# Patient Record
Sex: FEMALE | Race: BLACK OR AFRICAN AMERICAN | NOT HISPANIC OR LATINO | Employment: UNEMPLOYED | URBAN - METROPOLITAN AREA
[De-identification: names, ages, dates, MRNs, and addresses within clinical notes are randomized per-mention and may not be internally consistent; named-entity substitution may affect disease eponyms.]

---

## 2020-08-17 ENCOUNTER — HOSPITAL ENCOUNTER (INPATIENT)
Facility: HOSPITAL | Age: 45
LOS: 3 days | Discharge: HOME | DRG: 812 | End: 2020-08-20
Attending: EMERGENCY MEDICINE | Admitting: INTERNAL MEDICINE
Payer: MEDICAID

## 2020-08-17 DIAGNOSIS — D57.00 SICKLE CELL CRISIS: Primary | ICD-10-CM

## 2020-08-17 DIAGNOSIS — R07.9 CHEST PAIN: ICD-10-CM

## 2020-08-17 DIAGNOSIS — R09.02 HYPOXIA: ICD-10-CM

## 2020-08-17 DIAGNOSIS — M79.606 LEG PAIN: ICD-10-CM

## 2020-08-17 DIAGNOSIS — D57.00 SICKLE CELL PAIN CRISIS: ICD-10-CM

## 2020-08-17 PROCEDURE — 96361 HYDRATE IV INFUSION ADD-ON: CPT

## 2020-08-17 PROCEDURE — 96374 THER/PROPH/DIAG INJ IV PUSH: CPT

## 2020-08-17 PROCEDURE — 99285 EMERGENCY DEPT VISIT HI MDM: CPT | Mod: 25

## 2020-08-17 PROCEDURE — 93005 ELECTROCARDIOGRAM TRACING: CPT | Performed by: INTERNAL MEDICINE

## 2020-08-17 PROCEDURE — 12000002 HC ACUTE/MED SURGE SEMI-PRIVATE ROOM

## 2020-08-17 PROCEDURE — 96376 TX/PRO/DX INJ SAME DRUG ADON: CPT

## 2020-08-17 RX ORDER — HYDROMORPHONE HYDROCHLORIDE 1 MG/ML
1 INJECTION, SOLUTION INTRAMUSCULAR; INTRAVENOUS; SUBCUTANEOUS
Status: COMPLETED | OUTPATIENT
Start: 2020-08-18 | End: 2020-08-18

## 2020-08-18 PROBLEM — J18.9 PNEUMONIA: Status: ACTIVE | Noted: 2020-08-18

## 2020-08-18 PROBLEM — D57.00 SICKLE CELL CRISIS: Status: ACTIVE | Noted: 2020-08-18

## 2020-08-18 PROBLEM — R09.02 HYPOXIA: Status: ACTIVE | Noted: 2020-08-18

## 2020-08-18 LAB
ABO + RH BLD: NORMAL
ALBUMIN SERPL BCP-MCNC: 4.2 G/DL (ref 3.5–5.2)
ALP SERPL-CCNC: 60 U/L (ref 55–135)
ALT SERPL W/O P-5'-P-CCNC: 18 U/L (ref 10–44)
AMPHET+METHAMPHET UR QL: NEGATIVE
ANION GAP SERPL CALC-SCNC: 9 MMOL/L (ref 8–16)
AST SERPL-CCNC: 33 U/L (ref 10–40)
BARBITURATES UR QL SCN>200 NG/ML: NEGATIVE
BASOPHILS # BLD AUTO: 0.05 K/UL (ref 0–0.2)
BASOPHILS NFR BLD: 0.4 % (ref 0–1.9)
BENZODIAZ UR QL SCN>200 NG/ML: NEGATIVE
BILIRUB SERPL-MCNC: 2.4 MG/DL (ref 0.1–1)
BLD GP AB SCN CELLS X3 SERPL QL: NORMAL
BLOOD GROUP ANTIBODIES SERPL: NORMAL
BNP SERPL-MCNC: 40 PG/ML (ref 0–99)
BUN SERPL-MCNC: 8 MG/DL (ref 6–20)
BZE UR QL SCN: NEGATIVE
CALCIUM SERPL-MCNC: 8.7 MG/DL (ref 8.7–10.5)
CANNABINOIDS UR QL SCN: NEGATIVE
CHLORIDE SERPL-SCNC: 104 MMOL/L (ref 95–110)
CK MB SERPL-MCNC: 0.9 NG/ML (ref 0.1–6.5)
CK SERPL-CCNC: 84 U/L (ref 20–180)
CO2 SERPL-SCNC: 24 MMOL/L (ref 23–29)
CREAT SERPL-MCNC: 0.6 MG/DL (ref 0.5–1.4)
CREAT UR-MCNC: 49 MG/DL (ref 15–325)
DIFFERENTIAL METHOD: ABNORMAL
EOSINOPHIL # BLD AUTO: 0.3 K/UL (ref 0–0.5)
EOSINOPHIL NFR BLD: 1.8 % (ref 0–8)
ERYTHROCYTE [DISTWIDTH] IN BLOOD BY AUTOMATED COUNT: 19 % (ref 11.5–14.5)
EST. GFR  (AFRICAN AMERICAN): >60 ML/MIN/1.73 M^2
EST. GFR  (NON AFRICAN AMERICAN): >60 ML/MIN/1.73 M^2
GLUCOSE SERPL-MCNC: 100 MG/DL (ref 70–110)
HCT VFR BLD AUTO: 24.2 % (ref 37–48.5)
HGB BLD-MCNC: 8.5 G/DL (ref 12–16)
IMM GRANULOCYTES # BLD AUTO: 0.11 K/UL (ref 0–0.04)
IMM GRANULOCYTES NFR BLD AUTO: 0.8 % (ref 0–0.5)
LYMPHOCYTES # BLD AUTO: 4.7 K/UL (ref 1–4.8)
LYMPHOCYTES NFR BLD: 33.2 % (ref 18–48)
MCH RBC QN AUTO: 32 PG (ref 27–31)
MCHC RBC AUTO-ENTMCNC: 35.1 G/DL (ref 32–36)
MCV RBC AUTO: 91 FL (ref 82–98)
MONOCYTES # BLD AUTO: 1.6 K/UL (ref 0.3–1)
MONOCYTES NFR BLD: 11.2 % (ref 4–15)
NEUTROPHILS # BLD AUTO: 7.4 K/UL (ref 1.8–7.7)
NEUTROPHILS NFR BLD: 52.6 % (ref 38–73)
NRBC BLD-RTO: 1 /100 WBC
OPIATES UR QL SCN: NEGATIVE
PCP UR QL SCN>25 NG/ML: NEGATIVE
PLATELET # BLD AUTO: 379 K/UL (ref 150–350)
PMV BLD AUTO: 10.4 FL (ref 9.2–12.9)
POTASSIUM SERPL-SCNC: 3.5 MMOL/L (ref 3.5–5.1)
PROCALCITONIN SERPL IA-MCNC: 0.05 NG/ML (ref 0–0.5)
PROT SERPL-MCNC: 7.7 G/DL (ref 6–8.4)
RBC # BLD AUTO: 2.66 M/UL (ref 4–5.4)
RETICS/RBC NFR AUTO: 8 % (ref 0.5–2.5)
SARS-COV-2 RDRP RESP QL NAA+PROBE: NEGATIVE
SODIUM SERPL-SCNC: 137 MMOL/L (ref 136–145)
TOXICOLOGY INFORMATION: NORMAL
TROPONIN I SERPL DL<=0.01 NG/ML-MCNC: <0.03 NG/ML
WBC # BLD AUTO: 14.12 K/UL (ref 3.9–12.7)

## 2020-08-18 PROCEDURE — 36415 COLL VENOUS BLD VENIPUNCTURE: CPT

## 2020-08-18 PROCEDURE — 63600175 PHARM REV CODE 636 W HCPCS: Performed by: INTERNAL MEDICINE

## 2020-08-18 PROCEDURE — 12000002 HC ACUTE/MED SURGE SEMI-PRIVATE ROOM

## 2020-08-18 PROCEDURE — 25000003 PHARM REV CODE 250: Performed by: INTERNAL MEDICINE

## 2020-08-18 PROCEDURE — 82553 CREATINE MB FRACTION: CPT

## 2020-08-18 PROCEDURE — 25500020 PHARM REV CODE 255: Performed by: EMERGENCY MEDICINE

## 2020-08-18 PROCEDURE — 86902 BLOOD TYPE ANTIGEN DONOR EA: CPT

## 2020-08-18 PROCEDURE — 84484 ASSAY OF TROPONIN QUANT: CPT | Mod: 91

## 2020-08-18 PROCEDURE — 86850 RBC ANTIBODY SCREEN: CPT

## 2020-08-18 PROCEDURE — 63600175 PHARM REV CODE 636 W HCPCS

## 2020-08-18 PROCEDURE — 63600175 PHARM REV CODE 636 W HCPCS: Performed by: EMERGENCY MEDICINE

## 2020-08-18 PROCEDURE — 83880 ASSAY OF NATRIURETIC PEPTIDE: CPT

## 2020-08-18 PROCEDURE — 86905 BLOOD TYPING RBC ANTIGENS: CPT | Mod: 91

## 2020-08-18 PROCEDURE — 82550 ASSAY OF CK (CPK): CPT

## 2020-08-18 PROCEDURE — U0002 COVID-19 LAB TEST NON-CDC: HCPCS

## 2020-08-18 PROCEDURE — 63700000 PHARM REV CODE 250 ALT 637 W/O HCPCS: Performed by: INTERNAL MEDICINE

## 2020-08-18 PROCEDURE — 86870 RBC ANTIBODY IDENTIFICATION: CPT

## 2020-08-18 PROCEDURE — 85025 COMPLETE CBC W/AUTO DIFF WBC: CPT

## 2020-08-18 PROCEDURE — 80053 COMPREHEN METABOLIC PANEL: CPT

## 2020-08-18 PROCEDURE — 80307 DRUG TEST PRSMV CHEM ANLYZR: CPT

## 2020-08-18 PROCEDURE — 84145 PROCALCITONIN (PCT): CPT

## 2020-08-18 PROCEDURE — 84484 ASSAY OF TROPONIN QUANT: CPT

## 2020-08-18 PROCEDURE — 86922 COMPATIBILITY TEST ANTIGLOB: CPT

## 2020-08-18 PROCEDURE — 85045 AUTOMATED RETICULOCYTE COUNT: CPT

## 2020-08-18 RX ORDER — IBUPROFEN 200 MG
24 TABLET ORAL
Status: DISCONTINUED | OUTPATIENT
Start: 2020-08-18 | End: 2020-08-20 | Stop reason: HOSPADM

## 2020-08-18 RX ORDER — HYDROMORPHONE HYDROCHLORIDE 1 MG/ML
2 INJECTION, SOLUTION INTRAMUSCULAR; INTRAVENOUS; SUBCUTANEOUS
Status: COMPLETED | OUTPATIENT
Start: 2020-08-18 | End: 2020-08-18

## 2020-08-18 RX ORDER — HYDROMORPHONE HYDROCHLORIDE 1 MG/ML
1 INJECTION, SOLUTION INTRAMUSCULAR; INTRAVENOUS; SUBCUTANEOUS
Status: DISCONTINUED | OUTPATIENT
Start: 2020-08-18 | End: 2020-08-18

## 2020-08-18 RX ORDER — HYDROMORPHONE HYDROCHLORIDE 1 MG/ML
1 INJECTION, SOLUTION INTRAMUSCULAR; INTRAVENOUS; SUBCUTANEOUS EVERY 4 HOURS PRN
Status: DISCONTINUED | OUTPATIENT
Start: 2020-08-18 | End: 2020-08-18

## 2020-08-18 RX ORDER — SODIUM CHLORIDE 0.9 % (FLUSH) 0.9 %
10 SYRINGE (ML) INJECTION
Status: DISCONTINUED | OUTPATIENT
Start: 2020-08-18 | End: 2020-08-20 | Stop reason: HOSPADM

## 2020-08-18 RX ORDER — OXYCODONE HYDROCHLORIDE 5 MG/1
15 TABLET ORAL EVERY 4 HOURS PRN
Status: DISCONTINUED | OUTPATIENT
Start: 2020-08-18 | End: 2020-08-20 | Stop reason: HOSPADM

## 2020-08-18 RX ORDER — OXYCODONE HYDROCHLORIDE 15 MG/1
15 TABLET ORAL EVERY 4 HOURS PRN
COMMUNITY
Start: 2020-05-28

## 2020-08-18 RX ORDER — ALPRAZOLAM 0.25 MG/1
1 TABLET ORAL DAILY
COMMUNITY
Start: 2020-07-31 | End: 2023-04-01 | Stop reason: DRUGHIGH

## 2020-08-18 RX ORDER — CYCLOBENZAPRINE HCL 10 MG
1 TABLET ORAL 2 TIMES DAILY PRN
Status: ON HOLD | COMMUNITY
Start: 2020-08-11 | End: 2024-03-24

## 2020-08-18 RX ORDER — CYCLOBENZAPRINE HCL 10 MG
10 TABLET ORAL 3 TIMES DAILY PRN
Status: DISCONTINUED | OUTPATIENT
Start: 2020-08-18 | End: 2020-08-20 | Stop reason: HOSPADM

## 2020-08-18 RX ORDER — OXYCODONE HYDROCHLORIDE 5 MG/1
10 TABLET ORAL EVERY 6 HOURS PRN
Status: DISCONTINUED | OUTPATIENT
Start: 2020-08-18 | End: 2020-08-18

## 2020-08-18 RX ORDER — OXYCODONE HCL 10 MG/1
30 TABLET, FILM COATED, EXTENDED RELEASE ORAL 2 TIMES DAILY
Status: DISCONTINUED | OUTPATIENT
Start: 2020-08-18 | End: 2020-08-20 | Stop reason: HOSPADM

## 2020-08-18 RX ORDER — ALPRAZOLAM 0.25 MG/1
0.25 TABLET ORAL DAILY
Status: DISCONTINUED | OUTPATIENT
Start: 2020-08-18 | End: 2020-08-20 | Stop reason: HOSPADM

## 2020-08-18 RX ORDER — HYDROXYUREA 500 MG/1
1000 CAPSULE ORAL DAILY
Status: DISCONTINUED | OUTPATIENT
Start: 2020-08-18 | End: 2020-08-20 | Stop reason: HOSPADM

## 2020-08-18 RX ORDER — TOPIRAMATE 50 MG/1
50 TABLET, FILM COATED ORAL 2 TIMES DAILY
Status: ON HOLD | COMMUNITY
End: 2023-04-04 | Stop reason: SDUPTHER

## 2020-08-18 RX ORDER — HYDROMORPHONE HYDROCHLORIDE 1 MG/ML
2 INJECTION, SOLUTION INTRAMUSCULAR; INTRAVENOUS; SUBCUTANEOUS EVERY 4 HOURS PRN
Status: DISCONTINUED | OUTPATIENT
Start: 2020-08-18 | End: 2020-08-18

## 2020-08-18 RX ORDER — SODIUM CHLORIDE 9 MG/ML
INJECTION, SOLUTION INTRAVENOUS CONTINUOUS
Status: DISCONTINUED | OUTPATIENT
Start: 2020-08-18 | End: 2020-08-20

## 2020-08-18 RX ORDER — FOLIC ACID 1 MG/1
1 TABLET ORAL DAILY
Status: DISCONTINUED | OUTPATIENT
Start: 2020-08-18 | End: 2020-08-20 | Stop reason: HOSPADM

## 2020-08-18 RX ORDER — OXYCODONE HYDROCHLORIDE 30 MG/1
1 TABLET, FILM COATED, EXTENDED RELEASE ORAL 2 TIMES DAILY
COMMUNITY
Start: 2020-07-16 | End: 2023-04-01 | Stop reason: DRUGHIGH

## 2020-08-18 RX ORDER — OXYCODONE HYDROCHLORIDE 5 MG/1
5 TABLET ORAL EVERY 4 HOURS PRN
Status: DISCONTINUED | OUTPATIENT
Start: 2020-08-18 | End: 2020-08-18

## 2020-08-18 RX ORDER — AZITHROMYCIN 250 MG/1
500 TABLET, FILM COATED ORAL
Status: COMPLETED | OUTPATIENT
Start: 2020-08-18 | End: 2020-08-18

## 2020-08-18 RX ORDER — TOPIRAMATE 25 MG/1
50 TABLET ORAL 2 TIMES DAILY
Status: DISCONTINUED | OUTPATIENT
Start: 2020-08-18 | End: 2020-08-20 | Stop reason: HOSPADM

## 2020-08-18 RX ORDER — HYDROMORPHONE HYDROCHLORIDE 1 MG/ML
1 INJECTION, SOLUTION INTRAMUSCULAR; INTRAVENOUS; SUBCUTANEOUS EVERY 6 HOURS PRN
Status: DISCONTINUED | OUTPATIENT
Start: 2020-08-18 | End: 2020-08-18

## 2020-08-18 RX ORDER — GLUCAGON 1 MG
1 KIT INJECTION
Status: DISCONTINUED | OUTPATIENT
Start: 2020-08-18 | End: 2020-08-20 | Stop reason: HOSPADM

## 2020-08-18 RX ORDER — ZOLPIDEM TARTRATE 10 MG/1
1 TABLET ORAL NIGHTLY
COMMUNITY
Start: 2020-08-12 | End: 2023-04-01

## 2020-08-18 RX ORDER — HYDROMORPHONE HYDROCHLORIDE 1 MG/ML
2 INJECTION, SOLUTION INTRAMUSCULAR; INTRAVENOUS; SUBCUTANEOUS EVERY 4 HOURS PRN
Status: DISCONTINUED | OUTPATIENT
Start: 2020-08-18 | End: 2020-08-20

## 2020-08-18 RX ORDER — IBUPROFEN 200 MG
16 TABLET ORAL
Status: DISCONTINUED | OUTPATIENT
Start: 2020-08-18 | End: 2020-08-20 | Stop reason: HOSPADM

## 2020-08-18 RX ORDER — HYDROXYUREA 500 MG/1
1000 CAPSULE ORAL DAILY
COMMUNITY
Start: 2019-07-01

## 2020-08-18 RX ADMIN — HYDROMORPHONE HYDROCHLORIDE 2 MG: 1 INJECTION, SOLUTION INTRAMUSCULAR; INTRAVENOUS; SUBCUTANEOUS at 03:08

## 2020-08-18 RX ADMIN — HYDROMORPHONE HYDROCHLORIDE 1 MG: 1 INJECTION, SOLUTION INTRAMUSCULAR; INTRAVENOUS; SUBCUTANEOUS at 08:08

## 2020-08-18 RX ADMIN — IOHEXOL 100 ML: 350 INJECTION, SOLUTION INTRAVENOUS at 03:08

## 2020-08-18 RX ADMIN — HYDROMORPHONE HYDROCHLORIDE 2 MG: 1 INJECTION, SOLUTION INTRAMUSCULAR; INTRAVENOUS; SUBCUTANEOUS at 10:08

## 2020-08-18 RX ADMIN — AZITHROMYCIN MONOHYDRATE 500 MG: 250 TABLET ORAL at 07:08

## 2020-08-18 RX ADMIN — SODIUM CHLORIDE, SODIUM LACTATE, POTASSIUM CHLORIDE, AND CALCIUM CHLORIDE 1000 ML: .6; .31; .03; .02 INJECTION, SOLUTION INTRAVENOUS at 12:08

## 2020-08-18 RX ADMIN — ALPRAZOLAM 0.25 MG: 0.25 TABLET ORAL at 10:08

## 2020-08-18 RX ADMIN — HYDROMORPHONE HYDROCHLORIDE 1 MG: 1 INJECTION, SOLUTION INTRAMUSCULAR; INTRAVENOUS; SUBCUTANEOUS at 12:08

## 2020-08-18 RX ADMIN — FOLIC ACID 1 MG: 1 TABLET ORAL at 10:08

## 2020-08-18 RX ADMIN — HYDROXYUREA 1000 MG: 500 CAPSULE ORAL at 11:08

## 2020-08-18 RX ADMIN — TOPIRAMATE 50 MG: 25 TABLET, FILM COATED ORAL at 11:08

## 2020-08-18 RX ADMIN — TOPIRAMATE 50 MG: 25 TABLET, FILM COATED ORAL at 09:08

## 2020-08-18 RX ADMIN — OXYCODONE HYDROCHLORIDE 5 MG: 5 TABLET ORAL at 09:08

## 2020-08-18 RX ADMIN — HYDROMORPHONE HYDROCHLORIDE 2 MG: 1 INJECTION, SOLUTION INTRAMUSCULAR; INTRAVENOUS; SUBCUTANEOUS at 05:08

## 2020-08-18 RX ADMIN — SODIUM CHLORIDE: 0.9 INJECTION, SOLUTION INTRAVENOUS at 07:08

## 2020-08-18 RX ADMIN — OXYCODONE HYDROCHLORIDE 30 MG: 10 TABLET, FILM COATED, EXTENDED RELEASE ORAL at 01:08

## 2020-08-18 RX ADMIN — HYDROMORPHONE HYDROCHLORIDE 1 MG: 1 INJECTION, SOLUTION INTRAMUSCULAR; INTRAVENOUS; SUBCUTANEOUS at 01:08

## 2020-08-18 RX ADMIN — OXYCODONE HYDROCHLORIDE 5 MG: 5 TABLET ORAL at 03:08

## 2020-08-18 RX ADMIN — CEFTRIAXONE 1 G: 1 INJECTION, SOLUTION INTRAVENOUS at 07:08

## 2020-08-18 RX ADMIN — HYDROMORPHONE HYDROCHLORIDE 1 MG: 1 INJECTION, SOLUTION INTRAMUSCULAR; INTRAVENOUS; SUBCUTANEOUS at 09:08

## 2020-08-18 NOTE — ED NOTES
Offered patient oxycodone, refused states she wants iv dilaudid and 5mg oxycodone po is not going to do anything for her back pain, paged md to notify

## 2020-08-18 NOTE — PROGRESS NOTES
Pt has chest pain for many months and feels that they have worsened. She refuses a cardiology consult and accepts the risks.  Current pain 8/10 chest, back legs.   Pt was to be Dilaudid 2 mg Q 4 but the order reverted to 1 mg  MS contin 30 mg PO Q 12 and oxycodone 15 Q 4 prn pain  Plan as above, telemetry, neuro checks Q 4, serial troponin's and EKG's.  Echocardiogram in the AM

## 2020-08-18 NOTE — ASSESSMENT & PLAN NOTE
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

## 2020-08-18 NOTE — HPI
45yo F with h/o PE, sickle cell disease came to the emergency room with the chest pain and neck area pain and mild shortness of breath .  pain is sharp and worst with a deep breath and CTA was done in ER and negative .  She had accidental fall from the swing and he the neck and that is the reason where she was having neck pain.  CT cervical spine was done and no acute fracture.   She has chronic SOB but this has felt worse since Monday, especially on exertion .  Her oxygen level is around 90-91 in the room air and she walked around and oxygen level was tested and it went down to 88 and admitted.  Denied having fever, abdominal pain, joint pains and a her  hemoglobin is stable for a sickle cell patient. Awaited for covid test to come back.  She has of anticoagulation currently.

## 2020-08-18 NOTE — ED PROVIDER NOTES
"Encounter Date: 8/17/2020       History     Chief Complaint   Patient presents with    Chest Pain    Shortness of Breath     Since last Monday. States cant take a deep breath and has hx of PE    Back Pain     Fell off swing and landed on back last Monay     HPI   43yo F with h/o PE, sickle cell disease who presents with chest pain since Monday. The pain is sharp and worst with a deep breath. It is on the left chest. She has chronic SOB but this has felt worse since Monday, especially on exertion. This feels similar to a previous provoked PE diagnosed "years ago" and treated with lovenox for 3 months. She is not on blood thinners currently. She does have b/l leg cramping which feels like typical sickle cell pain.     She also reports cervical and thoracic back pain after a wooden swing fell onto her striking her in the upper back. This occurred 1 week ago from today. She has no numbness or weakness in the arms or legs, no shooting pain outside of the midline upper back. She has been massaging the area which sometimes helps.    Review of patient's allergies indicates:  No Known Allergies  Past Medical History:   Diagnosis Date    Anticoagulant long-term use      History reviewed. No pertinent surgical history.  History reviewed. No pertinent family history.  Social History     Tobacco Use    Smoking status: Never Smoker    Smokeless tobacco: Never Used   Substance Use Topics    Alcohol use: Not Currently    Drug use: Not Currently     Review of Systems   Constitutional: Negative for fever.   HENT: Negative for sore throat.    Respiratory: Positive for shortness of breath.    Cardiovascular: Positive for chest pain.   Gastrointestinal: Negative for nausea.   Genitourinary: Negative for dysuria.   Musculoskeletal: Positive for back pain.        Leg cramping   Skin: Negative for rash.   Neurological: Negative for weakness.   Hematological: Does not bruise/bleed easily.       Physical Exam     Initial Vitals " [08/17/20 2326]   BP Pulse Resp Temp SpO2   (!) 147/69 (!) 115 16 99.5 °F (37.5 °C) 96 %      MAP       --         Physical Exam    Constitutional: She appears well-developed and well-nourished. She is not diaphoretic.   HENT:   Head: Normocephalic and atraumatic.   Eyes: EOM are normal. Pupils are equal, round, and reactive to light. Right eye exhibits no discharge. Left eye exhibits no discharge.   Neck: Normal range of motion. Neck supple.   Cardiovascular: Regular rhythm and normal heart sounds. Exam reveals no gallop and no friction rub.    No murmur heard.  tachycardia   Pulmonary/Chest: Breath sounds normal. No respiratory distress. She has no wheezes. She has no rhonchi. She has no rales.   Abdominal: Soft. She exhibits no distension. There is no abdominal tenderness. There is no rebound and no guarding.   Musculoskeletal: No tenderness or edema.      Comments: No erythema, tenderness, or swelling to the b/l LE   Neurological: She is alert and oriented to person, place, and time.   5/5 strength to the b/l LE and UE, sensation in tact to light touch to b/l UE and LE.   Skin: Skin is warm and dry.   Psychiatric: She has a normal mood and affect. Thought content normal.         ED Course   Procedures  Labs Reviewed   CBC W/ AUTO DIFFERENTIAL - Abnormal; Notable for the following components:       Result Value    WBC 14.12 (*)     RBC 2.66 (*)     Hemoglobin 8.5 (*)     Hematocrit 24.2 (*)     Mean Corpuscular Hemoglobin 32.0 (*)     RDW 19.0 (*)     Platelets 379 (*)     Immature Granulocytes 0.8 (*)     Immature Grans (Abs) 0.11 (*)     Mono # 1.6 (*)     nRBC 1 (*)     All other components within normal limits   COMPREHENSIVE METABOLIC PANEL - Abnormal; Notable for the following components:    Total Bilirubin 2.4 (*)     All other components within normal limits   RETICULOCYTES - Abnormal; Notable for the following components:    Retic 8.0 (*)     All other components within normal limits   TROPONIN I    B-TYPE NATRIURETIC PEPTIDE   CK   CK-MB   DRUG SCREEN PANEL, URINE EMERGENCY    Narrative:     Specimen Source->Urine   TROPONIN I   SARS-COV-2 RNA AMPLIFICATION, QUAL   HAPTOGLOBIN   POCT URINE PREGNANCY   TYPE & SCREEN   ANTIBODY IDENTIFICATION   PREPARE RBC SOFT        ECG Results          EKG 12-lead (Final result)  Result time 08/18/20 09:23:22    Final result by Interface, Lab In [facility] (08/18/20 09:23:22)                 Narrative:    Test Reason : R07.9,    Vent. Rate : 108 BPM     Atrial Rate : 108 BPM     P-R Int : 130 ms          QRS Dur : 072 ms      QT Int : 328 ms       P-R-T Axes : 054 039 -20 degrees     QTc Int : 439 ms    Sinus tachycardia  Nonspecific T wave abnormality  Abnormal ECG  No previous ECGs available  Confirmed by Uri Kirk MD ([id number]) on 8/18/2020 9:23:07 AM    Referred By: DANIELAERR   SELF           Confirmed By:Uri Kirk MD                             EKG 12-LEAD (Final result)  Result time 08/19/20 16:04:40    Final result by Unknown User (08/19/20 16:04:40)                                Imaging Results          CTA Chest Non-Coronary (PE Study) (Final result)  Result time 08/17/20 23:45:00    Final result by Omar Mcgarry MD (08/17/20 23:45:00)                 Narrative:    EXAM DESCRIPTION: CTA CHEST NON CORONARY 8/18/2020 3:09 AM CDT    CLINICAL HISTORY: 44 years, Female, h/o PE and sickle cell disease. chest pain that feels like previous PE    COMPARISON: None.    PROCEDURE:  Multiple transaxial tomograms of the chest were obtained from the lung apices through the lung bases utilizing 2 mm slice thickness at 2 mm interval reconstruction after the administration of 100 cc of Omnipaque 350 at a rate of 4.0 cc per second for complete opacification of the pulmonary arteries.  Subsequent maximum intensity projection images were generated in the coronal and sagittal plane for review.  An individualized dose optimization technique, Automated Exposure Control, was utilized  for the performed procedure.    FINDINGS:  The lungs parenchyma demonstrate dependent atelectatic changes mid right upper lung and within the lung bases, slightly more pronounced left side than right. No significant masses, nodules and/or consolidations are identified.  The trachea mainstem bronchus demonstrate to be normal. There is no significant pericardial or pleural effusions.  Aorta and great vessels demonstrate to be unremarkable. No definitive thoracic aortic dissection. The heart is normal in size. No evidence for right ventricular strain. No coronary artery calcifications There is no significant mediastinal and/or hilar lymphadenopathy. Pulmonary arteries demonstrate to be normal, no intraluminal defect are seen that would suggest pulmonary embolus.  The axillary regions demonstrate to be clear.  There is is small anterior right diaphragmatic eventration.  The rest bone windows demonstrate no significant skeletal lesions.    IMPRESSION:    NO EVIDENCE FOR PULMONARY EMBOLUS AND/OR THORACIC AORTIC THORACIC DISSECTION.  DEPENDENT ATELECTATIC CHANGES.    Electronically signed by:  Omar Mcgarry MD  8/18/2020 3:14 AM CDT Workstation: [id number]                             CT Cervical Spine Without Contrast (Final result)  Result time 08/17/20 23:44:00    Final result by Omar Mcgarry MD (08/17/20 23:44:00)                 Narrative:    EXAM DESCRIPTION: CT CERVICAL SPINE WITHOUT CONTRAST 8/18/2020 2:41 AM CDT    CLINICAL HISTORY: 44 years, Female, trauma to neck 1 week ago, midline neck pain; Neck pain/ no trauma    COMPARISON: None    PROCEDURE:  Multiple axial CT images through the cervical spine were obtained at 2 mm slice thickness at 2 mm interval reconstruction. In addition 2-D multiplanar reformats and the sagittal coronal plane were performed and reviewed.  An individualized dose optimization technique, Automated Exposure Control, was utilized for the performed procedure.    FINDINGS:  The alignment,  vertebral body heights, and disc spaces are normal.  There is no evidence of fracture or subluxation. Very minimal degenerative disc disease is identified at C6/C7. The spinal canal demonstrate no evidence for significant stenosis. Neural foramina demonstrate to be unremarkable. The uncovertebral joints demonstrate to be normal. There is no prevertebral soft tissue swelling.  Sagittal coronal reformatted images demonstrate no subluxation or bony abnormalities.    IMPRESSION:    CT CERVICAL SPINE NEGATIVE FOR FRACTURE OR SUBLUXATION.    Electronically signed by:  Omar Mcgarry MD  8/18/2020 2:43 AM CDT Workstation: [id number]                             US Lower Extremity Veins Bilateral (Final result)  Result time 08/17/20 23:57:00    Final result by Omar Mcgarry MD (08/17/20 23:57:00)                 Narrative:    EXAM DESCRIPTION: US LOWER EXTREMITY VEINS BILATERAL 8/18/2020 1:37 AM CDT    CLINICAL HISTORY: 44 years, Female,    COMPARISON: None.    FINDINGS:    Multiple grayscale images as well as duplex Doppler ultrasound of both lower extremities were performed.  Both common femoral veins, superficial femoral veins, popliteal veins, posterior tibial veins at the level the calf and ankles were imaged.  Spectral waveform demonstrate normal compressibility and phasicity.  No intraluminal defects were seen.    IMPRESSION:    NO EVIDENCE FOR DEEP VEIN THROMBOSIS BILATERAL LOWER EXTREMITIES.    Electronically signed by:  Omar Mcgarry MD  8/18/2020 1:38 AM CDT Workstation: [id number]                             X-Ray Chest AP Portable (Final result)  Result time 08/17/20 23:30:00    Final result by Omar Mcgarry MD (08/17/20 23:30:00)                 Narrative:    EXAM DESCRIPTION: XR CHEST AP PORTABLE 8/18/2020 1:38 AM CDT    CLINICAL HISTORY: 44 years, Female, Chest Pain; Chest Pain?; Shortness of Breath?(Since last Monday. States cant take a deep breath and has hx of PE); Back Pain?(Fell off swing and landed on  back last Monay)    COMPARISON: 4/9/2018    FINDINGS:    Single view of the chest was obtained portable. Prior films were compared. External EKG leads within the field-of-view limits diagnosis. Lung volume is slightly decreased.  The cardiomediastinal silhouette demonstrate to be unremarkable.  The heart is in the upper normal size. The thoracic aorta is mildly tortuous. The pulmonary vasculature is normal distribution. Costophrenic angles are sharp.  No areas of consolidation or masses are seen.  The rest of the soft tissue and bony structures demonstrate to be unremarkable.    IMPRESSION:    SLIGHT DECREASED LUNG VOLUME. NO ACUTE CARDIOPULMONARY DISEASE SEEN.    Electronically signed by:  Omar Mcgarry MD  8/18/2020 1:40 AM CDT Workstation: [id number]                            MDM  43yo F with h/o sickle cell disease and PE who presents with neck pain and pleuritic chest pain and SOB  VS notable for temp of 99.5F, Tachycardic to 120s on arrival but sating mid 90s on RA.   Given her h/o PE, work up initiated for PE vs PNA vs acute chest.   Labs notable for WBC of 14.12, Hgb of 8.5, with retic of 8. Trop and BNP are wnl. CT PE shows no acute PE, and only minimal b/l atelectasis. Her pain was treated with 1mg dilaudid and then 2mg dilaudid which did help. However while here, she was noted to desat to 89% on RA while at rest. This was present prior to dilaudid second round and was with normal respiratory rate and alertness. She continued to have poor saturations in the low 90s. I walked the pt and she became tachycardic to 130s with hypoxia to 88% after a 1 minute walk. Unclear if this is a developing PNA, COVID, or early acute chest. I have covered her for CAP with CTX and azithromycin and have placed her on 2L O2 which she responded well to. Will admit to hosp medicine for monitoring and treatment.    Omar Finch MD  Resident, PGY-3  8/19/2020 5:02 AM                      Attending Attestation:   Physician  Attestation Statement for Resident:  As the supervising MD   Physician Attestation Statement: I have personally seen and examined this patient.   I agree with the above history. -:   As the supervising MD I agree with the above PE.    As the supervising MD I agree with the above treatment, course, plan, and disposition.  I was personally present during the entire procedure.  I have reviewed and agree with the residents interpretation of the following: lab data, x-rays, CT scans and EKG.  I have reviewed the following: old records at this facility.                                  Clinical Impression:       ICD-10-CM ICD-9-CM   1. Hypoxia  R09.02 799.02   2. Chest pain  R07.9 786.50   3. Leg pain  M79.606 729.5   4. Sickle cell pain crisis  D57.00 282.62             ED Disposition Condition    Observation                           Omar Finch MD  Resident  08/18/20 0502       Can Tolbert MD  08/19/20 9614

## 2020-08-18 NOTE — PLAN OF CARE
08/18/20 0900   Home Oxygen Qualification   Room Air SpO2 At Rest 96 %   Room Air SpO2 on Exertion 93 %   SpO2 on Recovery 94 %   Recovery Heart Rate 120 bpm   Home O2 Eval Comments Patient walked 6 minutes did not require oxygen

## 2020-08-18 NOTE — SUBJECTIVE & OBJECTIVE
Past Medical History:   Diagnosis Date    Anticoagulant long-term use        No past surgical history on file.    Review of patient's allergies indicates:  No Known Allergies    No current facility-administered medications on file prior to encounter.      No current outpatient medications on file prior to encounter.     Family History     None        Tobacco Use    Smoking status: Not on file   Substance and Sexual Activity    Alcohol use: Not Currently    Drug use: Not Currently    Sexual activity: Not Currently     Review of Systems   Constitutional: Positive for activity change. Negative for appetite change and chills.   HENT: Negative for congestion.    Respiratory: Positive for chest tightness. Negative for apnea.    Cardiovascular: Negative for chest pain.   Gastrointestinal: Negative for abdominal distention and abdominal pain.   Genitourinary: Negative for difficulty urinating.   Musculoskeletal: Negative for arthralgias.   Neurological: Negative for dizziness.   Psychiatric/Behavioral: Negative for agitation.     Objective:     Vital Signs (Most Recent):  Temp: 99.5 °F (37.5 °C) (08/17/20 2326)  Pulse: 97 (08/18/20 0426)  Resp: 15 (08/18/20 0426)  BP: 129/66 (08/18/20 0345)  SpO2: 99 % (08/18/20 0426) Vital Signs (24h Range):  Temp:  [99.5 °F (37.5 °C)] 99.5 °F (37.5 °C)  Pulse:  [] 97  Resp:  [15-28] 15  SpO2:  [94 %-99 %] 99 %  BP: (122-147)/(65-74) 129/66     Weight: 81.6 kg (180 lb)  Body mass index is 32.92 kg/m².    Physical Exam  Vitals signs and nursing note reviewed.   HENT:      Head: Normocephalic and atraumatic.   Eyes:      Conjunctiva/sclera: Conjunctivae normal.   Neck:      Vascular: No JVD.   Cardiovascular:      Heart sounds: Normal heart sounds.   Pulmonary:      Effort: Pulmonary effort is normal.      Breath sounds: Normal breath sounds.   Abdominal:      General: Bowel sounds are normal.      Palpations: Abdomen is soft.   Skin:     General: Skin is warm.   Neurological:       Mental Status: She is alert and oriented to person, place, and time.             Significant Labs:   CBC:   Recent Labs   Lab 08/18/20  0027   WBC 14.12*   HGB 8.5*   HCT 24.2*   *     CMP:   Recent Labs   Lab 08/18/20  0027      K 3.5      CO2 24      BUN 8   CREATININE 0.6   CALCIUM 8.7   PROT 7.7   ALBUMIN 4.2   BILITOT 2.4*   ALKPHOS 60   AST 33   ALT 18   ANIONGAP 9   EGFRNONAA >60.0       Significant Imaging: I have reviewed and interpreted all pertinent imaging results/findings within the past 24 hours.     CT chest   FINDINGS:  The lungs parenchyma demonstrate dependent atelectatic changes mid right upper lung and within the lung bases, slightly more pronounced left side than right. No significant masses, nodules and/or consolidations are identified.  The trachea mainstem bronchus demonstrate to be normal. There is no significant pericardial or pleural effusions.  Aorta and great vessels demonstrate to be unremarkable. No definitive thoracic aortic dissection. The heart is normal in size. No evidence for right ventricular strain. No coronary artery calcifications There is no significant mediastinal and/or hilar lymphadenopathy. Pulmonary arteries demonstrate to be normal, no intraluminal defect are seen that would suggest pulmonary embolus.  The axillary regions demonstrate to be clear.  There is is small anterior right diaphragmatic eventration.  The rest bone windows demonstrate no significant skeletal lesions.     IMPRESSION:     NO EVIDENCE FOR PULMONARY EMBOLUS AND/OR THORACIC AORTIC THORACIC DISSECTION.  DEPENDENT ATELECTATIC CHANGES.

## 2020-08-18 NOTE — H&P
[facility] Medicine  History & Physical    Patient Name: Sunita Mendez  MRN: [id number]  Admission Date: 8/17/2020  Attending Physician: No att. providers found   Primary Care Provider: No primary care provider on file.         Patient information was obtained from patient and ER records.     Subjective:     Principal Problem:Pneumonia    Chief Complaint:   Chief Complaint   Patient presents with    Chest Pain    Shortness of Breath     Since last Monday. States cant take a deep breath and has hx of PE    Back Pain     Fell off swing and landed on back last Monay        HPI: 43yo F with h/o PE, sickle cell disease came to the emergency room with the chest pain and neck area pain and mild shortness of breath .  pain is sharp and worst with a deep breath and CTA was done in ER and negative .  She had accidental fall from the swing and he the neck and that is the reason where she was having neck pain.  CT cervical spine was done and no acute fracture.   She has chronic SOB but this has felt worse since Monday, especially on exertion .  Her oxygen level is around 90-91 in the room air and she walked around and oxygen level was tested and it went down to 88 and admitted.  Denied having fever, abdominal pain, joint pains and a her  hemoglobin is stable for a sickle cell patient. Awaited for covid test to come back.  She is off  anticoagulation currently.    No new subjective & objective note has been filed under this hospital service since the last note was generated.    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

## 2020-08-19 ENCOUNTER — CLINICAL SUPPORT (OUTPATIENT)
Dept: CARDIOLOGY | Facility: HOSPITAL | Age: 45
DRG: 812 | End: 2020-08-19
Attending: INTERNAL MEDICINE
Payer: MEDICAID

## 2020-08-19 VITALS — HEIGHT: 62 IN | WEIGHT: 179 LBS | BODY MASS INDEX: 32.94 KG/M2

## 2020-08-19 LAB
ANION GAP SERPL CALC-SCNC: 7 MMOL/L (ref 8–16)
AORTIC ROOT ANNULUS: 3.06 CM
AORTIC VALVE CUSP SEPERATION: 2.03 CM
AV INDEX (PROSTH): 0.83
AV MEAN GRADIENT: 7 MMHG
AV PEAK GRADIENT: 12 MMHG
AV VALVE AREA: 3.08 CM2
AV VELOCITY RATIO: 79.79
BSA FOR ECHO PROCEDURE: 1.88 M2
BUN SERPL-MCNC: 8 MG/DL (ref 6–20)
CALCIUM SERPL-MCNC: 9.1 MG/DL (ref 8.7–10.5)
CHLORIDE SERPL-SCNC: 106 MMOL/L (ref 95–110)
CO2 SERPL-SCNC: 24 MMOL/L (ref 23–29)
CREAT SERPL-MCNC: 0.5 MG/DL (ref 0.5–1.4)
CV ECHO LV RWT: 0.52 CM
DOP CALC AO PEAK VEL: 1.74 M/S
DOP CALC AO VTI: 33.67 CM
DOP CALC LVOT AREA: 3.7 CM2
DOP CALC LVOT DIAMETER: 2.17 CM
DOP CALC LVOT PEAK VEL: 138.84 M/S
DOP CALC LVOT STROKE VOLUME: 103.87 CM3
DOP CALCLVOT PEAK VEL VTI: 28.1 CM
E WAVE DECELERATION TIME: 171.01 MSEC
E/A RATIO: 1.4
E/E' RATIO: 7.04 M/S
ECHO LV POSTERIOR WALL: 1.28 CM (ref 0.6–1.1)
ERYTHROCYTE [DISTWIDTH] IN BLOOD BY AUTOMATED COUNT: 19.4 % (ref 11.5–14.5)
EST. GFR  (AFRICAN AMERICAN): >60 ML/MIN/1.73 M^2
EST. GFR  (NON AFRICAN AMERICAN): >60 ML/MIN/1.73 M^2
FRACTIONAL SHORTENING: 44 % (ref 28–44)
GLUCOSE SERPL-MCNC: 121 MG/DL (ref 70–110)
HCT VFR BLD AUTO: 21.1 % (ref 37–48.5)
HGB BLD-MCNC: 7.1 G/DL (ref 12–16)
INTERVENTRICULAR SEPTUM: 1.33 CM (ref 0.6–1.1)
IVRT: 91.17 MSEC
LEFT INTERNAL DIMENSION IN SYSTOLE: 2.78 CM (ref 2.1–4)
LEFT VENTRICLE MASS INDEX: 143 G/M2
LEFT VENTRICULAR INTERNAL DIMENSION IN DIASTOLE: 4.96 CM (ref 3.5–6)
LEFT VENTRICULAR MASS: 260 G
LV LATERAL E/E' RATIO: 5.94 M/S
LV SEPTAL E/E' RATIO: 8.64 M/S
MCH RBC QN AUTO: 30.7 PG (ref 27–31)
MCHC RBC AUTO-ENTMCNC: 33.6 G/DL (ref 32–36)
MCV RBC AUTO: 91 FL (ref 82–98)
MV PEAK A VEL: 0.68 M/S
MV PEAK E VEL: 0.95 M/S
PISA TR MAX VEL: 2.77 M/S
PLATELET # BLD AUTO: 317 K/UL (ref 150–350)
PMV BLD AUTO: 10.6 FL (ref 9.2–12.9)
POTASSIUM SERPL-SCNC: 4.4 MMOL/L (ref 3.5–5.1)
PV PEAK VELOCITY: 120.59 CM/S
RA PRESSURE: 3 MMHG
RBC # BLD AUTO: 2.31 M/UL (ref 4–5.4)
RIGHT VENTRICULAR END-DIASTOLIC DIMENSION: 281 CM
SODIUM SERPL-SCNC: 137 MMOL/L (ref 136–145)
TDI LATERAL: 0.16 M/S
TDI SEPTAL: 0.11 M/S
TDI: 0.14 M/S
TR MAX PG: 31 MMHG
TROPONIN I SERPL DL<=0.01 NG/ML-MCNC: <0.03 NG/ML
TV REST PULMONARY ARTERY PRESSURE: 34 MMHG
WBC # BLD AUTO: 10.41 K/UL (ref 3.9–12.7)

## 2020-08-19 PROCEDURE — 84484 ASSAY OF TROPONIN QUANT: CPT

## 2020-08-19 PROCEDURE — 63600175 PHARM REV CODE 636 W HCPCS: Performed by: INTERNAL MEDICINE

## 2020-08-19 PROCEDURE — 80048 BASIC METABOLIC PNL TOTAL CA: CPT

## 2020-08-19 PROCEDURE — 93306 TTE W/DOPPLER COMPLETE: CPT | Mod: 26,,, | Performed by: INTERNAL MEDICINE

## 2020-08-19 PROCEDURE — 36415 COLL VENOUS BLD VENIPUNCTURE: CPT

## 2020-08-19 PROCEDURE — 93005 ELECTROCARDIOGRAM TRACING: CPT | Performed by: INTERNAL MEDICINE

## 2020-08-19 PROCEDURE — 12000002 HC ACUTE/MED SURGE SEMI-PRIVATE ROOM

## 2020-08-19 PROCEDURE — 94640 AIRWAY INHALATION TREATMENT: CPT

## 2020-08-19 PROCEDURE — 99900035 HC TECH TIME PER 15 MIN (STAT)

## 2020-08-19 PROCEDURE — 93306 ECHO (CUPID ONLY): ICD-10-PCS | Mod: 26,,, | Performed by: INTERNAL MEDICINE

## 2020-08-19 PROCEDURE — 85027 COMPLETE CBC AUTOMATED: CPT

## 2020-08-19 PROCEDURE — 25000003 PHARM REV CODE 250: Performed by: INTERNAL MEDICINE

## 2020-08-19 PROCEDURE — 25000242 PHARM REV CODE 250 ALT 637 W/ HCPCS: Performed by: INTERNAL MEDICINE

## 2020-08-19 PROCEDURE — 93306 TTE W/DOPPLER COMPLETE: CPT

## 2020-08-19 RX ORDER — IPRATROPIUM BROMIDE AND ALBUTEROL SULFATE 2.5; .5 MG/3ML; MG/3ML
3 SOLUTION RESPIRATORY (INHALATION) EVERY 6 HOURS PRN
Status: DISCONTINUED | OUTPATIENT
Start: 2020-08-19 | End: 2020-08-20 | Stop reason: HOSPADM

## 2020-08-19 RX ORDER — IBUPROFEN 400 MG/1
800 TABLET ORAL ONCE
Status: COMPLETED | OUTPATIENT
Start: 2020-08-19 | End: 2020-08-19

## 2020-08-19 RX ADMIN — HYDROMORPHONE HYDROCHLORIDE 2 MG: 1 INJECTION, SOLUTION INTRAMUSCULAR; INTRAVENOUS; SUBCUTANEOUS at 02:08

## 2020-08-19 RX ADMIN — TOPIRAMATE 50 MG: 25 TABLET, FILM COATED ORAL at 09:08

## 2020-08-19 RX ADMIN — SODIUM CHLORIDE: 0.9 INJECTION, SOLUTION INTRAVENOUS at 05:08

## 2020-08-19 RX ADMIN — SODIUM CHLORIDE: 0.9 INJECTION, SOLUTION INTRAVENOUS at 06:08

## 2020-08-19 RX ADMIN — OXYCODONE HYDROCHLORIDE 15 MG: 5 TABLET ORAL at 03:08

## 2020-08-19 RX ADMIN — OXYCODONE HYDROCHLORIDE 15 MG: 5 TABLET ORAL at 07:08

## 2020-08-19 RX ADMIN — OXYCODONE HYDROCHLORIDE 15 MG: 5 TABLET ORAL at 06:08

## 2020-08-19 RX ADMIN — HYDROMORPHONE HYDROCHLORIDE 2 MG: 1 INJECTION, SOLUTION INTRAMUSCULAR; INTRAVENOUS; SUBCUTANEOUS at 10:08

## 2020-08-19 RX ADMIN — HYDROMORPHONE HYDROCHLORIDE 2 MG: 1 INJECTION, SOLUTION INTRAMUSCULAR; INTRAVENOUS; SUBCUTANEOUS at 09:08

## 2020-08-19 RX ADMIN — FOLIC ACID 1 MG: 1 TABLET ORAL at 09:08

## 2020-08-19 RX ADMIN — ALPRAZOLAM 0.25 MG: 0.25 TABLET ORAL at 09:08

## 2020-08-19 RX ADMIN — OXYCODONE HYDROCHLORIDE 30 MG: 10 TABLET, FILM COATED, EXTENDED RELEASE ORAL at 10:08

## 2020-08-19 RX ADMIN — HYDROMORPHONE HYDROCHLORIDE 2 MG: 1 INJECTION, SOLUTION INTRAMUSCULAR; INTRAVENOUS; SUBCUTANEOUS at 06:08

## 2020-08-19 RX ADMIN — OXYCODONE HYDROCHLORIDE 30 MG: 10 TABLET, FILM COATED, EXTENDED RELEASE ORAL at 09:08

## 2020-08-19 RX ADMIN — IPRATROPIUM BROMIDE AND ALBUTEROL SULFATE 3 ML: .5; 3 SOLUTION RESPIRATORY (INHALATION) at 06:08

## 2020-08-19 RX ADMIN — IPRATROPIUM BROMIDE AND ALBUTEROL SULFATE 3 ML: .5; 3 SOLUTION RESPIRATORY (INHALATION) at 11:08

## 2020-08-19 RX ADMIN — IBUPROFEN 800 MG: 400 TABLET, FILM COATED ORAL at 05:08

## 2020-08-19 RX ADMIN — HYDROXYUREA 1000 MG: 500 CAPSULE ORAL at 09:08

## 2020-08-19 NOTE — NURSING
"Patient states 10/10 pain while Facetime w/ family laughing. Refused Oxycontin because " Dilaudid is the only thing that helps".  "

## 2020-08-19 NOTE — PLAN OF CARE
Problem: Adult Inpatient Plan of Care  Goal: Plan of Care Review  Outcome: Ongoing, Progressing  Goal: Patient-Specific Goal (Individualization)  Outcome: Ongoing, Progressing  Goal: Absence of Hospital-Acquired Illness or Injury  Outcome: Ongoing, Progressing  Goal: Optimal Comfort and Wellbeing  Outcome: Ongoing, Progressing  Goal: Readiness for Transition of Care  Outcome: Ongoing, Progressing  Goal: Rounds/Family Conference  Outcome: Ongoing, Progressing     Problem: Fluid Imbalance (Pneumonia)  Goal: Fluid Balance  Outcome: Ongoing, Progressing     Problem: Infection (Pneumonia)  Goal: Resolution of Infection Signs/Symptoms  Outcome: Ongoing, Progressing     Problem: Respiratory Compromise (Pneumonia)  Goal: Effective Oxygenation and Ventilation  Outcome: Ongoing, Progressing     Problem: Fall Injury Risk  Goal: Absence of Fall and Fall-Related Injury  Outcome: Ongoing, Progressing     Problem: Acute Neurologic Deterioration (Sickle Cell Disease)  Goal: Absence of Acute Neurologic Symptoms  Outcome: Ongoing, Progressing     Problem: Adjustment to Illness (Sickle Cell Disease)  Goal: Optimal Coping with Sickle Cell Disease  Outcome: Ongoing, Progressing     Problem: Inadequate Tissue Perfusion (Sickle Cell Disease)  Goal: Effective Tissue Perfusion  Outcome: Ongoing, Progressing     Problem: Infection (Sickle Cell Disease)  Goal: Absence of Infection Signs/Symptoms  Outcome: Ongoing, Progressing     Problem: Pain (Sickle Cell Disease)  Goal: Acceptable Pain Control  Outcome: Ongoing, Progressing     Problem: Respiratory Compromise (Sickle Cell Disease)  Goal: Optimal Oxygenation  Outcome: Ongoing, Progressing

## 2020-08-19 NOTE — PLAN OF CARE
08/19/20 0914   Patient Assessment/Suction   Level of Consciousness (AVPU) alert   Respiratory Effort Unlabored   PRE-TX-O2   O2 Device (Oxygen Therapy) room air   SpO2 95 %   Pulse 87   Resp 18   Respiratory Evaluation   $ Care Plan Tech Time 15 min

## 2020-08-19 NOTE — HOSPITAL COURSE
8/19/2020  Pt's chest pain is resolving with periods of general pain 8/10. She feels that she will be able to go home in the AM    8/20/2020  Pt's chest pain has resolved and she is taking PO home medications at present  Her echocardiogram was normal save foe an elevated PA pressure but she was on IV fluids  She denies SOB, MURRAY, orthopnea or pnd, Her sickle cell back and limb pain has decreased from a 10 to 4-7/10  Pt states that she regularly has a hemoglobin of 6-7 and wants to go home accepting all risks up to demise  She states that she has a PCP and I recommend that she see him in a week. Pt missed a hematology appointment last week because she felt bad and is trying to reschedule. I have referred her to cardiology re elevated pulmonary pressure after a discussion with Dr Hutson.

## 2020-08-19 NOTE — SUBJECTIVE & OBJECTIVE
Interval History: Improved body and chest pain.    Review of Systems   Constitutional: Positive for diaphoresis and fatigue.   HENT: Negative.    Eyes: Negative.    Respiratory: Negative.    Cardiovascular: Positive for chest pain.        Reduced   Gastrointestinal: Negative.    Endocrine: Positive for cold intolerance.   Genitourinary: Negative.    Musculoskeletal: Positive for arthralgias, back pain and myalgias.   Skin: Negative.    Neurological: Positive for weakness.   Hematological: Negative.    Psychiatric/Behavioral: Positive for decreased concentration. The patient is nervous/anxious.      Objective:     Vital Signs (Most Recent):  Temp: 98.7 °F (37.1 °C) (08/19/20 0832)  Pulse: 87 (08/19/20 0914)  Resp: 16 (08/19/20 1514)  BP: 112/69 (08/19/20 0832)  SpO2: 95 % (08/19/20 0914) Vital Signs (24h Range):  Temp:  [98.1 °F (36.7 °C)-98.7 °F (37.1 °C)] 98.7 °F (37.1 °C)  Pulse:  [] 87  Resp:  [16-20] 16  SpO2:  [94 %-97 %] 95 %  BP: (112-127)/(67-78) 112/69     Weight: 81.6 kg (179 lb 14.3 oz)  Body mass index is 32.9 kg/m².    Intake/Output Summary (Last 24 hours) at 8/19/2020 1651  Last data filed at 8/19/2020 0700  Gross per 24 hour   Intake 1900 ml   Output --   Net 1900 ml      Physical Exam  Vitals signs and nursing note reviewed.   Constitutional:       Comments: 0/10 painful faces   HENT:      Head: Normocephalic and atraumatic.      Nose: Nose normal.      Mouth/Throat:      Mouth: Mucous membranes are moist.   Eyes:      Extraocular Movements: Extraocular movements intact.      Pupils: Pupils are equal, round, and reactive to light.   Neck:      Musculoskeletal: Normal range of motion and neck supple.   Cardiovascular:      Rate and Rhythm: Normal rate and regular rhythm.   Pulmonary:      Effort: Pulmonary effort is normal.   Abdominal:      General: Abdomen is flat. Bowel sounds are normal.   Musculoskeletal: Normal range of motion.   Skin:     General: Skin is warm.   Neurological:       General: No focal deficit present.      Mental Status: She is alert and oriented to person, place, and time.   Psychiatric:      Comments: anxious         Significant Labs:   Recent Lab Results       08/19/20  0959   08/19/20  0437   08/18/20  2253        Anion Gap   7       Ao root annulus 3.06         Ao peak bello 1.74         Ao VTI 33.67         AV valve area 3.08         AORTIC VALVE CUSP SEPERATION 2.03         AV mean gradient 7         AV index (prosthetic) 0.83         AV peak gradient 12         AV Velocity Ratio 79.79         BSA 1.88         BUN, Bld   8       Calcium   9.1       Chloride   106       CO2   24       Creatinine   0.5       Left Ventricle Relative Wall Thickness 0.52         E/A ratio 1.40         E/E' ratio 7.04         eGFR if    >60.0       eGFR if non    >60.0  Comment:  Calculation used to obtain the estimated glomerular filtration  rate (eGFR) is the CKD-EPI equation.          E wave decelartion time 171.01         FS 44         Glucose   121       Hematocrit   21.1       Hemoglobin   7.1       IVRT 91.17         IVS 1.33         LVOT area 3.7         LV LATERAL E/E' RATIO 5.94         LV SEPTAL E/E' RATIO 8.64         LVIDD 4.96         LVIDS 2.78         LV mass 260.00         LV Mass Index 143         LVOT diameter 2.17         LVOT peak bello 138.84         LVOT stroke volume 103.87         LVOT peak VTI 28.10         MCH   30.7       MCHC   33.6       MCV   91       Mean e' 0.14         MPV   10.6       MV Peak A Bello 0.68         MV Peak E Bello 0.95         Platelets   317       Potassium   4.4       PV PEAK VELOCITY 120.59         PW 1.28         Right Atrial Pressure (from IVC) 3         RBC   2.31       RDW   19.4       RVDD 281.00         Sodium   137       TDI SEPTAL 0.11         TDI LATERAL 0.16         Triscuspid Valve Regurgitation Peak Gradient 31         TR Max Bello 2.77         Troponin I   <0.030 <0.030     TV rest pulmonary artery pressure  34         WBC   10.41             Significant Imaging: I have reviewed all pertinent imaging results/findings within the past 24 hours.

## 2020-08-19 NOTE — PROGRESS NOTES
[facility] Medicine  Progress Note    Patient Name: Sunita Mendez  MRN: [id number]  Patient Class: IP- Inpatient   Admission Date: 8/17/2020  Length of Stay: 1 days  Attending Physician: German Paz MD  Primary Care Provider: Primary Doctor No        Subjective:     Principal Problem:Pneumonia        HPI:  43yo F with h/o PE, sickle cell disease came to the emergency room with the chest pain and neck area pain and mild shortness of breath .  pain is sharp and worst with a deep breath and CTA was done in ER and negative .  She had accidental fall from the swing and he the neck and that is the reason where she was having neck pain.  CT cervical spine was done and no acute fracture.   She has chronic SOB but this has felt worse since Monday, especially on exertion .  Her oxygen level is around 90-91 in the room air and she walked around and oxygen level was tested and it went down to 88 and admitted.  Denied having fever, abdominal pain, joint pains and a her  hemoglobin is stable for a sickle cell patient. Awaited for covid test to come back.  She has of anticoagulation currently.    Overview/Hospital Course:  8/19/2020  Pt's chest pain is resolving with periods of general pain 8/10. She feels that she will be able to go home in the AM    Interval History: Improved body and chest pain.    Review of Systems   Constitutional: Positive for diaphoresis and fatigue.   HENT: Negative.    Eyes: Negative.    Respiratory: Negative.    Cardiovascular: Positive for chest pain.        Reduced   Gastrointestinal: Negative.    Endocrine: Positive for cold intolerance.   Genitourinary: Negative.    Musculoskeletal: Positive for arthralgias, back pain and myalgias.   Skin: Negative.    Neurological: Positive for weakness.   Hematological: Negative.    Psychiatric/Behavioral: Positive for decreased concentration. The patient is nervous/anxious.      Objective:     Vital Signs (Most Recent):  Temp: 98.7 °F  (37.1 °C) (08/19/20 0832)  Pulse: 87 (08/19/20 0914)  Resp: 16 (08/19/20 1514)  BP: 112/69 (08/19/20 0832)  SpO2: 95 % (08/19/20 0914) Vital Signs (24h Range):  Temp:  [98.1 °F (36.7 °C)-98.7 °F (37.1 °C)] 98.7 °F (37.1 °C)  Pulse:  [] 87  Resp:  [16-20] 16  SpO2:  [94 %-97 %] 95 %  BP: (112-127)/(67-78) 112/69     Weight: 81.6 kg (179 lb 14.3 oz)  Body mass index is 32.9 kg/m².    Intake/Output Summary (Last 24 hours) at 8/19/2020 1651  Last data filed at 8/19/2020 0700  Gross per 24 hour   Intake 1900 ml   Output --   Net 1900 ml      Physical Exam  Vitals signs and nursing note reviewed.   Constitutional:       Comments: 0/10 painful faces   HENT:      Head: Normocephalic and atraumatic.      Nose: Nose normal.      Mouth/Throat:      Mouth: Mucous membranes are moist.   Eyes:      Extraocular Movements: Extraocular movements intact.      Pupils: Pupils are equal, round, and reactive to light.   Neck:      Musculoskeletal: Normal range of motion and neck supple.   Cardiovascular:      Rate and Rhythm: Normal rate and regular rhythm.   Pulmonary:      Effort: Pulmonary effort is normal.   Abdominal:      General: Abdomen is flat. Bowel sounds are normal.   Musculoskeletal: Normal range of motion.   Skin:     General: Skin is warm.   Neurological:      General: No focal deficit present.      Mental Status: She is alert and oriented to person, place, and time.   Psychiatric:      Comments: anxious         Significant Labs:   Recent Lab Results       08/19/20  0959   08/19/20  0437   08/18/20  2253        Anion Gap   7       Ao root annulus 3.06         Ao peak luh 1.74         Ao VTI 33.67         AV valve area 3.08         AORTIC VALVE CUSP SEPERATION 2.03         AV mean gradient 7         AV index (prosthetic) 0.83         AV peak gradient 12         AV Velocity Ratio 79.79         BSA 1.88         BUN, Bld   8       Calcium   9.1       Chloride   106       CO2   24       Creatinine   0.5       Left  Ventricle Relative Wall Thickness 0.52         E/A ratio 1.40         E/E' ratio 7.04         eGFR if    >60.0       eGFR if non    >60.0  Comment:  Calculation used to obtain the estimated glomerular filtration  rate (eGFR) is the CKD-EPI equation.          E wave decelartion time 171.01         FS 44         Glucose   121       Hematocrit   21.1       Hemoglobin   7.1       IVRT 91.17         IVS 1.33         LVOT area 3.7         LV LATERAL E/E' RATIO 5.94         LV SEPTAL E/E' RATIO 8.64         LVIDD 4.96         LVIDS 2.78         LV mass 260.00         LV Mass Index 143         LVOT diameter 2.17         LVOT peak bello 138.84         LVOT stroke volume 103.87         LVOT peak VTI 28.10         MCH   30.7       MCHC   33.6       MCV   91       Mean e' 0.14         MPV   10.6       MV Peak A Bello 0.68         MV Peak E Bello 0.95         Platelets   317       Potassium   4.4       PV PEAK VELOCITY 120.59         PW 1.28         Right Atrial Pressure (from IVC) 3         RBC   2.31       RDW   19.4       RVDD 281.00         Sodium   137       TDI SEPTAL 0.11         TDI LATERAL 0.16         Triscuspid Valve Regurgitation Peak Gradient 31         TR Max Bello 2.77         Troponin I   <0.030 <0.030     TV rest pulmonary artery pressure 34         WBC   10.41             Significant Imaging: I have reviewed all pertinent imaging results/findings within the past 24 hours.      Assessment/Plan:   8/19/2020  Pt's pain has improved  A)  Chest pain resolving with normal echocardiogram save for elevated for PA pressures  Normal troponin's  Hb SS pain crisis-resolving  No evidence of pneumonia  P)  Likely discharge in the AM  Will refer the patient to cardiology Dr Be         8/18/2020  Pt has chest pain for many months and feels that they have worsened. She refuses a cardiology consult and accepts the risks.  Current pain 8/10 chest, back legs.   Pt was to be Dilaudid 2 mg Q 4 but the  order reverted to 1 mg  MS contin 30 mg PO Q 12 and oxycodone 15 Q 4 prn pain  Plan as above, telemetry, neuro checks Q 4, serial troponin's and EKG's.  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  Less suspicious for acute pulmonary syndrome of sickle cell     Plan   o2 support   Antibiotics   Blood culture   Pain killer  IVF         Hypoxia    Possible secondary to previous PE and anemia     Plan   Monitor     Sickle cell crisis  Possible mild crisis     Plan   IVF and pain killer and monitor markers        VTE Risk Mitigation (From admission, onward)         Ordered     IP VTE HIGH RISK PATIENT  Once      08/18/20 0518     Place sequential compression device  Until discontinued      08/18/20 0518                Discharge Planning   CORRINE:      Code Status: Full Code   Is the patient medically ready for discharge?:     Reason for patient still in hospital (select all that apply): Treatment                     German Paz MD  Department of Hospital Medicine   [facility]

## 2020-08-20 VITALS
RESPIRATION RATE: 16 BRPM | TEMPERATURE: 99 F | HEIGHT: 62 IN | WEIGHT: 179.88 LBS | SYSTOLIC BLOOD PRESSURE: 106 MMHG | DIASTOLIC BLOOD PRESSURE: 78 MMHG | HEART RATE: 105 BPM | OXYGEN SATURATION: 96 % | BODY MASS INDEX: 33.1 KG/M2

## 2020-08-20 PROBLEM — M87.00 AVASCULAR NECROSIS: Status: ACTIVE | Noted: 2019-03-14

## 2020-08-20 PROBLEM — R07.89 OTHER CHEST PAIN: Status: RESOLVED | Noted: 2020-08-20 | Resolved: 2020-08-20

## 2020-08-20 PROBLEM — Z86.711 HISTORY OF PULMONARY EMBOLUS (PE): Status: ACTIVE | Noted: 2017-03-11

## 2020-08-20 PROBLEM — R07.89 OTHER CHEST PAIN: Status: ACTIVE | Noted: 2020-08-20

## 2020-08-20 LAB
ANION GAP SERPL CALC-SCNC: 7 MMOL/L (ref 8–16)
BASOPHILS # BLD AUTO: 0.06 K/UL (ref 0–0.2)
BASOPHILS # BLD AUTO: 0.07 K/UL (ref 0–0.2)
BASOPHILS NFR BLD: 0.5 % (ref 0–1.9)
BASOPHILS NFR BLD: 0.6 % (ref 0–1.9)
BUN SERPL-MCNC: 8 MG/DL (ref 6–20)
CALCIUM SERPL-MCNC: 8.8 MG/DL (ref 8.7–10.5)
CHLORIDE SERPL-SCNC: 108 MMOL/L (ref 95–110)
CO2 SERPL-SCNC: 23 MMOL/L (ref 23–29)
CREAT SERPL-MCNC: 0.5 MG/DL (ref 0.5–1.4)
DIFFERENTIAL METHOD: ABNORMAL
DIFFERENTIAL METHOD: ABNORMAL
EOSINOPHIL # BLD AUTO: 0.7 K/UL (ref 0–0.5)
EOSINOPHIL # BLD AUTO: 0.8 K/UL (ref 0–0.5)
EOSINOPHIL NFR BLD: 5.6 % (ref 0–8)
EOSINOPHIL NFR BLD: 6.6 % (ref 0–8)
ERYTHROCYTE [DISTWIDTH] IN BLOOD BY AUTOMATED COUNT: 20 % (ref 11.5–14.5)
ERYTHROCYTE [DISTWIDTH] IN BLOOD BY AUTOMATED COUNT: 20.2 % (ref 11.5–14.5)
ERYTHROCYTE [DISTWIDTH] IN BLOOD BY AUTOMATED COUNT: 20.2 % (ref 11.5–14.5)
EST. GFR  (AFRICAN AMERICAN): >60 ML/MIN/1.73 M^2
EST. GFR  (NON AFRICAN AMERICAN): >60 ML/MIN/1.73 M^2
GLUCOSE SERPL-MCNC: 124 MG/DL (ref 70–110)
HCT VFR BLD AUTO: 19.6 % (ref 37–48.5)
HCT VFR BLD AUTO: 19.8 % (ref 37–48.5)
HCT VFR BLD AUTO: 19.8 % (ref 37–48.5)
HGB BLD-MCNC: 6.7 G/DL (ref 12–16)
IMM GRANULOCYTES # BLD AUTO: 0.06 K/UL (ref 0–0.04)
IMM GRANULOCYTES # BLD AUTO: 0.07 K/UL (ref 0–0.04)
IMM GRANULOCYTES NFR BLD AUTO: 0.5 % (ref 0–0.5)
IMM GRANULOCYTES NFR BLD AUTO: 0.6 % (ref 0–0.5)
LYMPHOCYTES # BLD AUTO: 4.3 K/UL (ref 1–4.8)
LYMPHOCYTES # BLD AUTO: 4.7 K/UL (ref 1–4.8)
LYMPHOCYTES NFR BLD: 36.3 % (ref 18–48)
LYMPHOCYTES NFR BLD: 38.6 % (ref 18–48)
MCH RBC QN AUTO: 30 PG (ref 27–31)
MCH RBC QN AUTO: 30 PG (ref 27–31)
MCH RBC QN AUTO: 30.2 PG (ref 27–31)
MCHC RBC AUTO-ENTMCNC: 33.8 G/DL (ref 32–36)
MCHC RBC AUTO-ENTMCNC: 33.8 G/DL (ref 32–36)
MCHC RBC AUTO-ENTMCNC: 34.2 G/DL (ref 32–36)
MCV RBC AUTO: 88 FL (ref 82–98)
MCV RBC AUTO: 89 FL (ref 82–98)
MCV RBC AUTO: 89 FL (ref 82–98)
MONOCYTES # BLD AUTO: 1.5 K/UL (ref 0.3–1)
MONOCYTES # BLD AUTO: 1.6 K/UL (ref 0.3–1)
MONOCYTES NFR BLD: 13.1 % (ref 4–15)
MONOCYTES NFR BLD: 13.2 % (ref 4–15)
NEUTROPHILS # BLD AUTO: 5 K/UL (ref 1.8–7.7)
NEUTROPHILS # BLD AUTO: 5.1 K/UL (ref 1.8–7.7)
NEUTROPHILS NFR BLD: 41.4 % (ref 38–73)
NEUTROPHILS NFR BLD: 43 % (ref 38–73)
NRBC BLD-RTO: 1 /100 WBC
NRBC BLD-RTO: 1 /100 WBC
PLATELET # BLD AUTO: 323 K/UL (ref 150–350)
PLATELET # BLD AUTO: 342 K/UL (ref 150–350)
PLATELET # BLD AUTO: 342 K/UL (ref 150–350)
PMV BLD AUTO: 10.3 FL (ref 9.2–12.9)
PMV BLD AUTO: 10.3 FL (ref 9.2–12.9)
PMV BLD AUTO: 10.4 FL (ref 9.2–12.9)
POTASSIUM SERPL-SCNC: 3.8 MMOL/L (ref 3.5–5.1)
RBC # BLD AUTO: 2.22 M/UL (ref 4–5.4)
RBC # BLD AUTO: 2.23 M/UL (ref 4–5.4)
RBC # BLD AUTO: 2.23 M/UL (ref 4–5.4)
SODIUM SERPL-SCNC: 138 MMOL/L (ref 136–145)
WBC # BLD AUTO: 11.75 K/UL (ref 3.9–12.7)
WBC # BLD AUTO: 11.75 K/UL (ref 3.9–12.7)
WBC # BLD AUTO: 12.07 K/UL (ref 3.9–12.7)

## 2020-08-20 PROCEDURE — 99900035 HC TECH TIME PER 15 MIN (STAT)

## 2020-08-20 PROCEDURE — 25000003 PHARM REV CODE 250: Performed by: INTERNAL MEDICINE

## 2020-08-20 PROCEDURE — 85025 COMPLETE CBC W/AUTO DIFF WBC: CPT

## 2020-08-20 PROCEDURE — 94640 AIRWAY INHALATION TREATMENT: CPT

## 2020-08-20 PROCEDURE — 80048 BASIC METABOLIC PNL TOTAL CA: CPT

## 2020-08-20 PROCEDURE — 93005 ELECTROCARDIOGRAM TRACING: CPT | Performed by: INTERNAL MEDICINE

## 2020-08-20 PROCEDURE — 94761 N-INVAS EAR/PLS OXIMETRY MLT: CPT

## 2020-08-20 PROCEDURE — 63600175 PHARM REV CODE 636 W HCPCS: Performed by: INTERNAL MEDICINE

## 2020-08-20 PROCEDURE — 36415 COLL VENOUS BLD VENIPUNCTURE: CPT

## 2020-08-20 PROCEDURE — 25000242 PHARM REV CODE 250 ALT 637 W/ HCPCS: Performed by: INTERNAL MEDICINE

## 2020-08-20 RX ORDER — HYDROMORPHONE HYDROCHLORIDE 1 MG/ML
1 INJECTION, SOLUTION INTRAMUSCULAR; INTRAVENOUS; SUBCUTANEOUS ONCE
Status: COMPLETED | OUTPATIENT
Start: 2020-08-20 | End: 2020-08-20

## 2020-08-20 RX ORDER — FOLIC ACID 1 MG/1
1 TABLET ORAL DAILY
Qty: 30 TABLET | Refills: 0 | Status: SHIPPED | OUTPATIENT
Start: 2020-08-21 | End: 2024-03-24

## 2020-08-20 RX ADMIN — TOPIRAMATE 50 MG: 25 TABLET, FILM COATED ORAL at 09:08

## 2020-08-20 RX ADMIN — HYDROMORPHONE HYDROCHLORIDE 1 MG: 1 INJECTION, SOLUTION INTRAMUSCULAR; INTRAVENOUS; SUBCUTANEOUS at 07:08

## 2020-08-20 RX ADMIN — HYDROXYUREA 1000 MG: 500 CAPSULE ORAL at 09:08

## 2020-08-20 RX ADMIN — OXYCODONE HYDROCHLORIDE 15 MG: 5 TABLET ORAL at 01:08

## 2020-08-20 RX ADMIN — FOLIC ACID 1 MG: 1 TABLET ORAL at 09:08

## 2020-08-20 RX ADMIN — IPRATROPIUM BROMIDE AND ALBUTEROL SULFATE 3 ML: .5; 3 SOLUTION RESPIRATORY (INHALATION) at 05:08

## 2020-08-20 RX ADMIN — HYDROMORPHONE HYDROCHLORIDE 2 MG: 1 INJECTION, SOLUTION INTRAMUSCULAR; INTRAVENOUS; SUBCUTANEOUS at 03:08

## 2020-08-20 RX ADMIN — SODIUM CHLORIDE: 0.9 INJECTION, SOLUTION INTRAVENOUS at 03:08

## 2020-08-20 RX ADMIN — OXYCODONE HYDROCHLORIDE 30 MG: 10 TABLET, FILM COATED, EXTENDED RELEASE ORAL at 09:08

## 2020-08-20 RX ADMIN — ALPRAZOLAM 0.25 MG: 0.25 TABLET ORAL at 09:08

## 2020-08-20 NOTE — CARE UPDATE
08/20/20 0936   Patient Assessment/Suction   Level of Consciousness (AVPU) alert   Respiratory Effort Normal;Unlabored   Expansion/Accessory Muscles/Retractions no use of accessory muscles   All Lung Fields Breath Sounds clear   PRE-TX-O2   O2 Device (Oxygen Therapy) room air   SpO2 (!) 94 %   Pulse Oximetry Type Intermittent   $ Pulse Oximetry - Multiple Charge Pulse Oximetry - Multiple   Resp 18   Aerosol Therapy   $ Aerosol Therapy Charges PRN treatment not required   Respiratory Evaluation   $ Care Plan Tech Time 15 min

## 2020-08-20 NOTE — DISCHARGE SUMMARY
[facility] Medicine  Discharge Summary      Patient Name: Sunita Mendez  MRN: [id number]  Admission Date: 8/17/2020  Hospital Length of Stay: 2 days  Discharge Date and Time:  08/20/2020 2:09 PM  Attending Physician: German Paz MD   Discharging Provider: German Paz MD  Primary Care Provider: Primary Doctor No      HPI:   45yo F with h/o PE, sickle cell disease came to the emergency room with the chest pain and neck area pain and mild shortness of breath .  pain is sharp and worst with a deep breath and CTA was done in ER and negative .  She had accidental fall from the swing and he the neck and that is the reason where she was having neck pain.  CT cervical spine was done and no acute fracture.   She has chronic SOB but this has felt worse since Monday, especially on exertion .  Her oxygen level is around 90-91 in the room air and she walked around and oxygen level was tested and it went down to 88 and admitted.  Denied having fever, abdominal pain, joint pains and a her  hemoglobin is stable for a sickle cell patient. Awaited for covid test to come back.  She has of anticoagulation currently.    * No surgery found *      Hospital Course:   8/19/2020  Pt's chest pain is resolving with periods of general pain 8/10. She feels that she will be able to go home in the AM    8/20/2020  Pt's chest pain has resolved and she is taking PO home medications at present  Her echocardiogram was normal save foe an elevated PA pressure but she was on IV fluids  She denies SOB, MURRAY, orthopnea or pnd, Her sickle cell back and limb pain has decreased from a 10 to 4-7/10  Pt states that she regularly has a hemoglobin of 6-7 and wants to go home accepting all risks up to demise  She states that she has a PCP and I recommend that she see him in a week. Pt missed a hematology appointment last week because she felt bad and is trying to reschedule. I have referred her to cardiology re elevated pulmonary  pressure after a discussion with Dr Hutson.     Consults:   Consults (From admission, onward)        Status Ordering Provider     Inpatient consult to Hematology/Oncology  Once     Provider:  Carlos Marin MD    Acknowledged JERMAINE BRANCH          No new Assessment & Plan notes have been filed under this hospital service since the last note was generated.  Service: Hospital Medicine    Final Active Diagnoses:    Diagnosis Date Noted POA    PRINCIPAL PROBLEM:  possible evolving Pneumonia [J18.9] 08/18/2020 No    Sickle cell crisis [D57.00] 08/18/2020 Yes    Hypoxia [R09.02] 08/18/2020 Yes    Sickle cell anemia [D57.1] 12/07/2016 Yes      Problems Resolved During this Admission:    Diagnosis Date Noted Date Resolved POA    Other chest pain [R07.89] 08/20/2020 08/20/2020 Unknown       Discharged Condition: good    Disposition: Home or Self Care    Follow Up:  Follow-up Information     PCP In 1 week.           Chet Matthews MD In 2 weeks.    Specialties: Cardiovascular Disease, Cardiology, INTERVENTIONAL CARDIOLOGY  Contact information:  [address] [place] [id number]  [phone]                 Patient Instructions:      CBC auto differential   Standing Status: Future Standing Exp. Date: 10/19/21     Diet Cardiac     Notify your health care provider if you experience any of the following:  temperature >100.4     Notify your health care provider if you experience any of the following:  persistent nausea and vomiting or diarrhea     Notify your health care provider if you experience any of the following:  severe uncontrolled pain     Notify your health care provider if you experience any of the following:  redness, tenderness, or signs of infection (pain, swelling, redness, odor or green/yellow discharge around incision site)     Notify your health care provider if you experience any of the following:  difficulty breathing or increased cough     Notify your health care provider if  you experience any of the following:  severe persistent headache     Notify your health care provider if you experience any of the following:  worsening rash     Notify your health care provider if you experience any of the following:  persistent dizziness, light-headedness, or visual disturbances     Notify your health care provider if you experience any of the following:  increased confusion or weakness     Activity as tolerated       Significant Diagnostic Studies: Labs:   BMP:   Recent Labs   Lab 08/19/20  0437 08/20/20  0825   * 124*    138   K 4.4 3.8    108   CO2 24 23   BUN 8 8   CREATININE 0.5 0.5   CALCIUM 9.1 8.8   , CMP   Recent Labs   Lab 08/19/20  0437 08/20/20  0825    138   K 4.4 3.8    108   CO2 24 23   * 124*   BUN 8 8   CREATININE 0.5 0.5   CALCIUM 9.1 8.8   ANIONGAP 7* 7*   ESTGFRAFRICA >60.0 >60.0   EGFRNONAA >60.0 >60.0   , CBC   Recent Labs   Lab 08/19/20  0437 08/20/20  0825   WBC 10.41 12.07   HGB 7.1* 6.7*   HCT 21.1* 19.6*    323   , Troponin   Recent Labs   Lab 08/19/20  0437   TROPONINI <0.030    and All labs within the past 24 hours have been reviewed  Microbiology: Blood Culture No results found for: LABBLOO    Pending Diagnostic Studies:     Procedure Component Value Units Date/Time    CBC Without Differential [[id number]]     Order Status: Sent Lab Status: No result     Specimen: Blood     CBC auto differential [[id number]]     Order Status: Sent Lab Status: No result     Specimen: Blood     EKG 12-lead [[id number]] Collected: 08/20/20 0540    Order Status: Sent Lab Status: In process Updated: 08/20/20 0824    Narrative:      Test Reason :     Vent. Rate : 095 BPM     Atrial Rate : 095 BPM     P-R Int : 132 ms          QRS Dur : 072 ms      QT Int : 342 ms       P-R-T Axes : 051 027 005 degrees     QTc Int : 429 ms    Normal sinus rhythm with sinus arrhythmia  Nonspecific T wave abnormality  Abnormal ECG  When compared with ECG of 19-AUG-2020  07:20,  Nonspecific T wave abnormality now evident in Lateral leads    Referred By: [email or web address]   SELF           Confirmed By:     EKG 12-lead [[id number]]     Order Status: Sent Lab Status: No result     EKG 12-lead [[id number]]     Order Status: Sent Lab Status: No result          Medications:  Reconciled Home Medications:      Medication List      START taking these medications    folic acid 1 MG tablet  Commonly known as: FOLVITE  Take 1 tablet (1 mg total) by mouth once daily.  Start taking on: August 21, 2020        CONTINUE taking these medications    ALPRAZolam 0.25 MG tablet  Commonly known as: XANAX  Take 1 tablet by mouth once daily.     cyclobenzaprine 10 MG tablet  Commonly known as: FLEXERIL  Take 1 tablet by mouth 2 (two) times daily as needed.     hydroxyurea 500 mg Cap  Commonly known as: HYDREA  Take 1,000 mg by mouth once daily.     * oxyCODONE 15 MG Tab  Commonly known as: ROXICODONE  Take 15 mg by mouth every 4 (four) hours as needed.     * OxyCODONE 30 mg Tr12 12 hr tablet  Generic drug: oxyCODONE  Take 1 tablet by mouth 2 (two) times daily.     topiramate 50 MG tablet  Commonly known as: TOPAMAX  Take 50 mg by mouth 2 (two) times daily.     zolpidem 10 mg Tab  Commonly known as: AMBIEN  Take 1 tablet by mouth every evening.         * This list has 2 medication(s) that are the same as other medications prescribed for you. Read the directions carefully, and ask your doctor or other care provider to review them with you.                Indwelling Lines/Drains at time of discharge:   Lines/Drains/Airways     None                 Time spent on the discharge of patient: 25 minutes  Patient was seen and examined on the date of discharge and determined to be suitable for discharge.         German Paz MD  Department of Hospital Medicine  [facility]

## 2020-08-20 NOTE — PLAN OF CARE
08/19/20 2313   Patient Assessment/Suction   Level of Consciousness (AVPU) alert   Respiratory Effort Unlabored   Expansion/Accessory Muscles/Retractions no use of accessory muscles   All Lung Fields Breath Sounds clear   Rhythm/Pattern, Respiratory unlabored   Cough Frequency no cough   PRE-TX-O2   O2 Device (Oxygen Therapy) room air   SpO2 97 %   Pulse 98   Resp 18   Aerosol Therapy   $ Aerosol Therapy Charges Aerosol Treatment   Daily Review of Necessity (SVN) completed   Respiratory Treatment Status (SVN) given   Treatment Route (SVN) mask   Patient Position (SVN) HOB elevated   Post Treatment Assessment (SVN) breath sounds unchanged   Signs of Intolerance (SVN) none   Breath Sounds Post-Respiratory Treatment   Throughout All Fields Post-Treatment All Fields   Throughout All Fields Post-Treatment aeration increased   Post-treatment Heart Rate (beats/min) 99   Post-treatment Resp Rate (breaths/min) 18   Respiratory Evaluation   $ Care Plan Tech Time 15 min   Evaluation For   (CARE PLAN)

## 2020-08-20 NOTE — PLAN OF CARE
Problem: Adult Inpatient Plan of Care  Goal: Plan of Care Review  Outcome: Met  Goal: Patient-Specific Goal (Individualization)  Outcome: Met  Goal: Absence of Hospital-Acquired Illness or Injury  Outcome: Met  Goal: Optimal Comfort and Wellbeing  Outcome: Met  Goal: Readiness for Transition of Care  Outcome: Met  Goal: Rounds/Family Conference  Outcome: Met     Problem: Fluid Imbalance (Pneumonia)  Goal: Fluid Balance  Outcome: Met     Problem: Infection (Pneumonia)  Goal: Resolution of Infection Signs/Symptoms  Outcome: Met     Problem: Respiratory Compromise (Pneumonia)  Goal: Effective Oxygenation and Ventilation  Outcome: Met     Problem: Fall Injury Risk  Goal: Absence of Fall and Fall-Related Injury  Outcome: Met     Problem: Acute Neurologic Deterioration (Sickle Cell Disease)  Goal: Absence of Acute Neurologic Symptoms  Outcome: Met     Problem: Adjustment to Illness (Sickle Cell Disease)  Goal: Optimal Coping with Sickle Cell Disease  Outcome: Met     Problem: Inadequate Tissue Perfusion (Sickle Cell Disease)  Goal: Effective Tissue Perfusion  Outcome: Met     Problem: Infection (Sickle Cell Disease)  Goal: Absence of Infection Signs/Symptoms  Outcome: Met     Problem: Pain (Sickle Cell Disease)  Goal: Acceptable Pain Control  Outcome: Met     Problem: Respiratory Compromise (Sickle Cell Disease)  Goal: Optimal Oxygenation  Outcome: Met

## 2020-08-20 NOTE — NURSING
"Patient states she is wheezing and having shortness of breath, however her O2 saturation is 99% on room and and breath sounds in all fields was clear. She stated that " no, I'm wheezing, I can hear myself " and began making audible noises like she was wheezing.  "

## 2020-08-20 NOTE — PROGRESS NOTES
[facility]  Progress Note    Patient Name: Sunita Mendez  MRN: [id number]  Patient Class: IP- Inpatient   Admission Date: 8/17/2020  Length of Stay: 2 days  Attending Physician: German Paz MD  Primary Care Provider: Primary Doctor No        Subjective:     Principal Problem:Pneumonia        HPI:  43yo F with h/o PE, sickle cell disease came to the emergency room with the chest pain and neck area pain and mild shortness of breath .  pain is sharp and worst with a deep breath and CTA was done in ER and negative .  She had accidental fall from the swing and he the neck and that is the reason where she was having neck pain.  CT cervical spine was done and no acute fracture.   She has chronic SOB but this has felt worse since Monday, especially on exertion .  Her oxygen level is around 90-91 in the room air and she walked around and oxygen level was tested and it went down to 88 and admitted.  Denied having fever, abdominal pain, joint pains and a her  hemoglobin is stable for a sickle cell patient. Awaited for covid test to come back.  She has of anticoagulation currently.    Overview/Hospital Course:  8/19/2020  Pt's chest pain is resolving with periods of general pain 8/10. She feels that she will be able to go home in the AM    8/20/2020  Pt's chest pain has resolved and she is taking PO home medications at present  Her echocardiogram was normal save foe an elevated PA pressure but she was on IV fluids  She denies SOB, MURRAY, orthopnea or pnd, Her sickle cell back and limb pain has decreased from a 10 to 4-7/10  Pt states that she regularly has a hemoglobin of 6-7 and wants to go home accepting all risks up to demise  She states that she has a PCP and I recommend that she see him in a week. Pt missed a hematology appointment last week because she felt bad and is trying to reschedule. I have referred her to cardiology re elevated pulmonary pressure after a discussion with   Haresh.    Interval History: Pt desires to go home see her  D/C summary    Review of Systems   Constitutional: Positive for fatigue.   HENT: Negative.    Respiratory: Negative.    Cardiovascular: Negative.    Gastrointestinal: Negative.    Genitourinary: Negative.    Musculoskeletal: Positive for arthralgias and myalgias.   Skin: Negative.    Neurological: Negative.    Hematological: Negative.    Psychiatric/Behavioral: Negative.      Objective:     Vital Signs (Most Recent):  Temp: 98.7 °F (37.1 °C) (08/20/20 1227)  Pulse: 105 (08/20/20 0850)  Resp: 16 (08/20/20 1323)  BP: 106/78 (08/20/20 1227)  SpO2: 96 % (08/20/20 1227) Vital Signs (24h Range):  Temp:  [98 °F (36.7 °C)-99.1 °F (37.3 °C)] 98.7 °F (37.1 °C)  Pulse:  [] 105  Resp:  [16-20] 16  SpO2:  [93 %-100 %] 96 %  BP: (103-138)/(53-85) 106/78     Weight: 81.6 kg (179 lb 14.3 oz)  Body mass index is 32.9 kg/m².  No intake or output data in the 24 hours ending 08/20/20 1412   Physical Exam  Vitals signs and nursing note reviewed.   Constitutional:       General: She is not in acute distress.     Appearance: Normal appearance. She is not ill-appearing.      Comments: 0/10 painful faces   HENT:      Head: Normocephalic and atraumatic.      Nose: Nose normal.      Mouth/Throat:      Mouth: Mucous membranes are moist.   Eyes:      Extraocular Movements: Extraocular movements intact.      Pupils: Pupils are equal, round, and reactive to light.   Neck:      Musculoskeletal: Normal range of motion and neck supple.   Cardiovascular:      Rate and Rhythm: Normal rate and regular rhythm.      Heart sounds: Gallop present.       Comments: S 4  Pulmonary:      Effort: Pulmonary effort is normal.      Breath sounds: Normal breath sounds.   Abdominal:      General: Bowel sounds are normal. There is no distension.      Tenderness: There is no abdominal tenderness.   Musculoskeletal: Normal range of motion.   Skin:     General: Skin is warm.   Neurological:      General:  No focal deficit present.      Mental Status: She is alert and oriented to person, place, and time.   Psychiatric:         Mood and Affect: Mood normal.         Significant Labs:   Recent Lab Results       08/20/20  0825        Anion Gap 7     Baso # 0.07     Basophil% 0.6     BUN, Bld 8     Calcium 8.8     Chloride 108     CO2 23     Creatinine 0.5     Differential Method Automated     eGFR if African American >60.0     eGFR if non  >60.0  Comment:  Calculation used to obtain the estimated glomerular filtration  rate (eGFR) is the CKD-EPI equation.        Eos # 0.7     Eosinophil% 5.6     Glucose 124     Gran # (ANC) 5.0     Gran% 41.4     Hematocrit 19.6  Comment:  Hgb & Hct critical result(s) repeated. Called and verbal readback   obtained from Janna Talley RN/1200 by JBStuart 08/20/2020 09:10       Hemoglobin 6.7  Comment:  Hgb & Hct critical result(s) repeated. Called and verbal readback   obtained from Janna Talley RN/1200 by JBStuart 08/20/2020 09:10       Immature Grans (Abs) 0.07  Comment:  Mild elevation in immature granulocytes is non specific and   can be seen in a variety of conditions including stress response,   acute inflammation, trauma and pregnancy. Correlation with other   laboratory and clinical findings is essential.       Immature Granulocytes 0.6     Lymph # 4.7     Lymph% 38.6     MCH 30.2     MCHC 34.2     MCV 88     Mono # 1.6     Mono% 13.2     MPV 10.4     nRBC 1     Platelets 323     Potassium 3.8     RBC 2.22     RDW 20.0     Sodium 138     WBC 12.07           Significant Imaging: I have reviewed all pertinent imaging results/findings within the past 24 hours.      Assessment/Plan:   8/20/2020   Pt's chest pain has resolved and she is taking PO home medications at present  Her echocardiogram was normal save foe an elevated PA pressure but she was on IV fluids  She denies SOB, MURRAY, orthopnea or pnd, Her sickle cell back and limb pain has decreased from a 10 to 4-7/10  Pt  states that she regularly has a hemoglobin of 6-7 and wants to go home accepting all risks up to demise  She states that she has a PCP and I recommend that she see him in a week. Pt missed a hematology appointment last week because she felt bad and is trying to reschedule. I have referred her to cardiology re elevated pulmonary pressure after a discussion with Dr Hutson.       * possible evolving Pneumonia  Not sure evolving pneumonia/atelectasis  Possible CTEPH  from previous pulmonary emboli  Less suspicious for acute pulmonary syndrome of sickle cell     Plan   o2 support   Antibiotics   Blood culture   Pain killer  IVF   Atelectasis by CTA chest with a normal procalcitonin-no evidence of pneumonia      Hypoxia    Possible secondary to previous PE and anemia     Plan   Monitor     Sickle cell crisis  Possible mild crisis     Plan   IVF and pain killer and monitor markers        VTE Risk Mitigation (From admission, onward)         Ordered     IP VTE HIGH RISK PATIENT  Once      08/18/20 0518     Place sequential compression device  Until discontinued      08/18/20 0518                Discharge Planning   CORRINE: 8/20/2020     Code Status: Full Code   Is the patient medically ready for discharge?: Yes    Reason for patient still in hospital (select all that apply): Other (specify) Pt's chest pain has resolved                     German Paz MD  Department of Hospital Medicine   [facility]

## 2020-08-20 NOTE — SUBJECTIVE & OBJECTIVE
Interval History: Pt desires to go home see her  D/C summary    Review of Systems   Constitutional: Positive for fatigue.   HENT: Negative.    Respiratory: Negative.    Cardiovascular: Negative.    Gastrointestinal: Negative.    Genitourinary: Negative.    Musculoskeletal: Positive for arthralgias and myalgias.   Skin: Negative.    Neurological: Negative.    Hematological: Negative.    Psychiatric/Behavioral: Negative.      Objective:     Vital Signs (Most Recent):  Temp: 98.7 °F (37.1 °C) (08/20/20 1227)  Pulse: 105 (08/20/20 0850)  Resp: 16 (08/20/20 1323)  BP: 106/78 (08/20/20 1227)  SpO2: 96 % (08/20/20 1227) Vital Signs (24h Range):  Temp:  [98 °F (36.7 °C)-99.1 °F (37.3 °C)] 98.7 °F (37.1 °C)  Pulse:  [] 105  Resp:  [16-20] 16  SpO2:  [93 %-100 %] 96 %  BP: (103-138)/(53-85) 106/78     Weight: 81.6 kg (179 lb 14.3 oz)  Body mass index is 32.9 kg/m².  No intake or output data in the 24 hours ending 08/20/20 1412   Physical Exam  Vitals signs and nursing note reviewed.   Constitutional:       General: She is not in acute distress.     Appearance: Normal appearance. She is not ill-appearing.      Comments: 0/10 painful faces   HENT:      Head: Normocephalic and atraumatic.      Nose: Nose normal.      Mouth/Throat:      Mouth: Mucous membranes are moist.   Eyes:      Extraocular Movements: Extraocular movements intact.      Pupils: Pupils are equal, round, and reactive to light.   Neck:      Musculoskeletal: Normal range of motion and neck supple.   Cardiovascular:      Rate and Rhythm: Normal rate and regular rhythm.      Heart sounds: Gallop present.       Comments: S 4  Pulmonary:      Effort: Pulmonary effort is normal.      Breath sounds: Normal breath sounds.   Abdominal:      General: Bowel sounds are normal. There is no distension.      Tenderness: There is no abdominal tenderness.   Musculoskeletal: Normal range of motion.   Skin:     General: Skin is warm.   Neurological:      General: No focal  deficit present.      Mental Status: She is alert and oriented to person, place, and time.   Psychiatric:         Mood and Affect: Mood normal.         Significant Labs:   Recent Lab Results       08/20/20  0825        Anion Gap 7     Baso # 0.07     Basophil% 0.6     BUN, Bld 8     Calcium 8.8     Chloride 108     CO2 23     Creatinine 0.5     Differential Method Automated     eGFR if African American >60.0     eGFR if non  >60.0  Comment:  Calculation used to obtain the estimated glomerular filtration  rate (eGFR) is the CKD-EPI equation.        Eos # 0.7     Eosinophil% 5.6     Glucose 124     Gran # (ANC) 5.0     Gran% 41.4     Hematocrit 19.6  Comment:  Hgb & Hct critical result(s) repeated. Called and verbal readback   obtained from Janna Talley RN/1200 by JBStuart 08/20/2020 09:10       Hemoglobin 6.7  Comment:  Hgb & Hct critical result(s) repeated. Called and verbal readback   obtained from Janna Talley RN/1200 by JBStuart 08/20/2020 09:10       Immature Grans (Abs) 0.07  Comment:  Mild elevation in immature granulocytes is non specific and   can be seen in a variety of conditions including stress response,   acute inflammation, trauma and pregnancy. Correlation with other   laboratory and clinical findings is essential.       Immature Granulocytes 0.6     Lymph # 4.7     Lymph% 38.6     MCH 30.2     MCHC 34.2     MCV 88     Mono # 1.6     Mono% 13.2     MPV 10.4     nRBC 1     Platelets 323     Potassium 3.8     RBC 2.22     RDW 20.0     Sodium 138     WBC 12.07           Significant Imaging: I have reviewed all pertinent imaging results/findings within the past 24 hours.

## 2020-08-22 LAB
BLD PROD TYP BPU: NORMAL
BLOOD UNIT EXPIRATION DATE: NORMAL
BLOOD UNIT TYPE CODE: 9500
BLOOD UNIT TYPE: NORMAL
CODING SYSTEM: NORMAL
DISPENSE STATUS: NORMAL
NUM UNITS TRANS PACKED RBC: NORMAL

## 2020-09-24 ENCOUNTER — HOSPITAL ENCOUNTER (EMERGENCY)
Dept: HOSPITAL 88 - ER | Age: 45
Discharge: HOME | End: 2020-09-24
Payer: SELF-PAY

## 2020-09-24 VITALS — WEIGHT: 122 LBS | BODY MASS INDEX: 24.6 KG/M2 | HEIGHT: 59 IN

## 2020-09-24 DIAGNOSIS — F41.9: ICD-10-CM

## 2020-09-24 DIAGNOSIS — E11.9: ICD-10-CM

## 2020-09-24 DIAGNOSIS — F10.27: Primary | ICD-10-CM

## 2020-09-24 DIAGNOSIS — K21.9: ICD-10-CM

## 2020-09-24 DIAGNOSIS — I10: ICD-10-CM

## 2020-09-24 LAB
ALBUMIN SERPL-MCNC: 4.1 G/DL (ref 3.5–5)
ALBUMIN/GLOB SERPL: 1.1 {RATIO} (ref 0.8–2)
ALP SERPL-CCNC: 80 IU/L (ref 40–150)
ALT SERPL-CCNC: 37 IU/L (ref 0–55)
ANION GAP SERPL CALC-SCNC: 16.5 MMOL/L (ref 8–16)
BASOPHILS # BLD AUTO: 0.1 10*3/UL (ref 0–0.1)
BASOPHILS NFR BLD AUTO: 1 % (ref 0–1)
BUN SERPL-MCNC: < 5 MG/DL (ref 7–26)
BUN/CREAT SERPL: 8 (ref 6–25)
CALCIUM SERPL-MCNC: 10 MG/DL (ref 8.4–10.2)
CHLORIDE SERPL-SCNC: 107 MMOL/L (ref 98–107)
CO2 SERPL-SCNC: 24 MMOL/L (ref 22–29)
DEPRECATED NEUTROPHILS # BLD AUTO: 5.5 10*3/UL (ref 2.1–6.9)
EGFRCR SERPLBLD CKD-EPI 2021: > 60 ML/MIN (ref 60–?)
EOSINOPHIL # BLD AUTO: 0.8 10*3/UL (ref 0–0.4)
EOSINOPHIL NFR BLD AUTO: 8.2 % (ref 0–6)
ERYTHROCYTE [DISTWIDTH] IN CORD BLOOD: 14.9 % (ref 11.7–14.4)
GLOBULIN PLAS-MCNC: 3.9 G/DL (ref 2.3–3.5)
GLUCOSE SERPLBLD-MCNC: 103 MG/DL (ref 74–118)
HCT VFR BLD AUTO: 42.2 % (ref 34.2–44.1)
HGB BLD-MCNC: 13.2 G/DL (ref 12–16)
LYMPHOCYTES # BLD: 2.5 10*3/UL (ref 1–3.2)
LYMPHOCYTES NFR BLD AUTO: 25.1 % (ref 18–39.1)
MCH RBC QN AUTO: 31.1 PG (ref 28–32)
MCHC RBC AUTO-ENTMCNC: 31.3 G/DL (ref 31–35)
MCV RBC AUTO: 99.3 FL (ref 81–99)
MONOCYTES # BLD AUTO: 1 10*3/UL (ref 0.2–0.8)
MONOCYTES NFR BLD AUTO: 9.7 % (ref 4.4–11.3)
NEUTS SEG NFR BLD AUTO: 55.6 % (ref 38.7–80)
PLATELET # BLD AUTO: 237 X10E3/UL (ref 140–360)
POTASSIUM SERPL-SCNC: 3.5 MMOL/L (ref 3.5–5.1)
RBC # BLD AUTO: 4.25 X10E6/UL (ref 3.6–5.1)
SODIUM SERPL-SCNC: 144 MMOL/L (ref 136–145)

## 2020-09-24 PROCEDURE — 36415 COLL VENOUS BLD VENIPUNCTURE: CPT

## 2020-09-24 PROCEDURE — 80053 COMPREHEN METABOLIC PANEL: CPT

## 2020-09-24 PROCEDURE — 70450 CT HEAD/BRAIN W/O DYE: CPT

## 2020-09-24 PROCEDURE — 99284 EMERGENCY DEPT VISIT MOD MDM: CPT

## 2020-09-24 PROCEDURE — 85025 COMPLETE CBC W/AUTO DIFF WBC: CPT

## 2020-09-24 NOTE — DIAGNOSTIC IMAGING REPORT
Examination: CT BRAIN WO CONTRAST



History:Forgetfulness. Memory loss

Comparison studies:None



Technique:

Axial images were obtained from the skull base to the vertex.

Coronal and sagittal images reconstructed from the axial data.

Dose modulation, iterative reconstruction, and/or weight based adjustment of

the mA/kV was utilized to reduce the radiation dose to as low as reasonably

achievable. 

Intravenous contrast: None



Findings:



Scalp: No abnormalities.

Bones: No fractures, blastic or lytic lesions.



Brain sulci: Appropriate for age.

Ventricles: Normal in size and configuration. No hydrocephalus.



Extra-axial space:

No abnormalities.



Parenchyma: 

No abnormal densities. 

No masses, hemorrhage, or acute or chronic cortical based vascular insults..



Sellar/suprasellar region: No abnormalities.

Craniocervical junction: Patent foramen magnum. No Chiari one malformation.



Incidental findings: 

None.



Impression:

 

No acute intracranial abnormalities.



Signed by: Dr. Trini Jalloh M.D. on 9/24/2020 12:51 PM

## 2020-09-24 NOTE — EMERGENCY DEPARTMENT NOTE
History of Present Illnes


History of Present Illness


Chief Complaint:  General Medicine Complaints


History of Present Illness


This is a 44 year old  female  .


Historian:  Patient


Arrival Mode:  Car





Past Medical/Family History


Physician Review


I have reviewed the patient's past medical and family history.  Any updates have

been documented here.





Past Medical History


Recent Fever:  No


Clinical Suspicion of Infectio:  No


New/Unexplained Change in Ment:  No


Past Medical History:  Hypertension, Diabetes, Anxiety, Depression, GERD


Past Surgical History:  None





Social History


Smoking Cessation:  Never Smoker


Counseling Performed:  No


Alcohol Use:  Daily


Any Illegal Drug Use:  No


Physically hurt or threatened:  No





Other


Any Pre-Existing Lines (PICC,:  Yes





Physical Exam


Related Data


Allergies:  


Coded Allergies:  


     No Known Allergies (Unverified , 20)


Triage Vital Signs





Vital Signs








  Date Time  Temp Pulse Resp B/P (MAP) Pulse Ox O2 Delivery O2 Flow Rate FiO2


 


20 12:12 98.3 104 16 155/112 100 Room Air  











Physical Exam


CONSTITUTIONAL





HENT


EYES





NECK


PULMONARY


CARDIOVASCULAR





GASTROINTESTINAL





GENITOURINARY





SKIN


MUSCULOSKELETAL





NEUROLOGICAL





PSYCHOLOGICAL





Results


Laboratory


Result Diagram:  


20 1430                                                                    

           20 1430





Laboratory





Laboratory Tests








Test


 20


14:30


 


White Blood Count


 9.85 x10e3/uL


(4.8-10.8)


 


Red Blood Count


 4.25 x10e6/uL


(3.6-5.1)


 


Hemoglobin


 13.2 g/dL


(12.0-16.0)


 


Hematocrit


 42.2 %


(34.2-44.1)


 


Mean Corpuscular Volume


 99.3 fL


(81-99)


 


Mean Corpuscular Hemoglobin


 31.1 pg


(28-32)


 


Mean Corpuscular Hemoglobin


Concent 31.3 g/dL


(31-35)


 


Red Cell Distribution Width


 14.9 %


(11.7-14.4)


 


Platelet Count


 237 x10e3/uL


(140-360)


 


Neutrophils (%) (Auto)


 55.6 %


(38.7-80.0)


 


Lymphocytes (%) (Auto)


 25.1 %


(18.0-39.1)


 


Monocytes (%) (Auto)


 9.7  %


(4.4-11.3)


 


Eosinophils (%) (Auto)


 8.2 %


(0.0-6.0)


 


Basophils (%) (Auto)


 1.0 %


(0.0-1.0)


 


Neutrophils # (Auto) 5.5 (2.1-6.9) 


 


Lymphocytes # (Auto) 2.5 (1.0-3.2) 


 


Monocytes # (Auto) 1.0 (0.2-0.8) 


 


Eosinophils # (Auto) 0.8 (0.0-0.4) 


 


Basophils # (Auto) 0.1 (0.0-0.1) 


 


Absolute Immature Granulocyte


(auto 0.04 x10e3/uL


(0-0.1)


 


Sodium Level


 144 mmol/L


(136-145)


 


Potassium Level


 3.5 mmol/L


(3.5-5.1)


 


Chloride Level


 107 mmol/L


()


 


Carbon Dioxide Level


 24 mmol/L


(22-29)


 


Anion Gap


 16.5 mmol/L


(8-16)


 


Blood Urea Nitrogen


 < 5 mg/dL


(7-26)


 


Creatinine


 0.64 mg/dL


(0.57-1.11)


 


Estimat Glomerular Filtration


Rate > 60 ML/MIN


(60-)


 


BUN/Creatinine Ratio 8 (6-25) 


 


Glucose Level


 103 mg/dL


()


 


Calcium Level


 10.0 mg/dL


(8.4-10.2)


 


Total Bilirubin


 1.0 mg/dL


(0.2-1.2)


 


Aspartate Amino Transf


(AST/SGOT) 53 IU/L (5-34) 





 


Alanine Aminotransferase


(ALT/SGPT) 37 IU/L (0-55) 





 


Alkaline Phosphatase


 80 IU/L


()


 


Total Protein


 8.0 g/dL


(6.5-8.1)


 


Albumin


 4.1 g/dL


(3.5-5.0)


 


Globulin


 3.9 g/dL


(2.3-3.5)


 


Albumin/Globulin Ratio 1.1 (0.8-2.0) 








Lab results reviewed:  Yes





Imaging


Imaging results reviewed:  Yes


Impressions


                        [place] [id number]








Patient Name: UMANG PATTON                          MR #: [id number]             


: 1975                         Age/Sex: 44/F


Acct #: [id number]                    Req #: [id number]


Adm Physician:                                      


Ordered by: GÓMEZ ARAUJO                     Report #: [id number] 


Location: ER                            Room/Bed:           


                             ___________________________________________________


________________________________________________





Procedure: [id number] CT/CT BRAIN WO


Exam Date: 20                            Exam Time: 1230








                              REPORT STATUS: Signed





Examination: CT BRAIN WO CONTRAST





History:Forgetfulness. Memory loss


Comparison studies:None





Technique:


Axial images were obtained from the skull base to the vertex.


Coronal and sagittal images reconstructed from the axial data.


Dose modulation, iterative reconstruction, and/or weight based adjustment of


the mA/kV was utilized to reduce the radiation dose to as low as reasonably


achievable. 


Intravenous contrast: None





Findings:





Scalp: No abnormalities.


Bones: No fractures, blastic or lytic lesions.





Brain sulci: Appropriate for age.


Ventricles: Normal in size and configuration. No hydrocephalus.





Extra-axial space:


No abnormalities.





Parenchyma: 


No abnormal densities. 


No masses, hemorrhage, or acute or chronic cortical based vascular insults..





Sellar/suprasellar region: No abnormalities.


Craniocervical junction: Patent foramen magnum. No Chiari one malformation.





Incidental findings: 


None.





Impression:


 


No acute intracranial abnormalities.





Signed by: Dr. Trini Jalloh M.D. on 2020 12:51 PM








Dictated By: TRINI CALDERA MD


Electronically Signed By: TRINI CALDERA MD on 20 1251


Transcribed By: GO on 20 1251 








COPY TO:   GÓMEZ ARAUJO~





Assessment & Plan


Medical Decision Making


MDM


alcohol abuse, dementia





Assessment & Plan


Final Impression:  


(1) Alcoholic dementia


Depart Disposition:  HOME, SELF-CARE


Last Vital Signs











  Date Time  Temp Pulse Resp B/P (MAP) Pulse Ox O2 Delivery O2 Flow Rate FiO2


 


20 12:12 98.3 104 16 155/112 100 Room Air  








Home Meds


Active Scripts


Memantine Hcl (NAMENDA) 10 Mg Tablet, 10 MG PO BID, #60 TAB


   Prov:GÓMEZ ARAUJO         20











GÓMEZ ARAUJO              Sep 24, 2020 15:16

## 2021-04-15 ENCOUNTER — IMMUNIZATION (OUTPATIENT)
Dept: PRIMARY CARE CLINIC | Facility: CLINIC | Age: 46
End: 2021-04-15
Payer: MEDICAID

## 2021-04-15 DIAGNOSIS — Z23 NEED FOR VACCINATION: Primary | ICD-10-CM

## 2021-04-15 PROCEDURE — 0001A COVID-19, MRNA, LNP-S, PF, 30 MCG/0.3 ML DOSE VACCINE: ICD-10-PCS | Mod: CV19,S$GLB,, | Performed by: FAMILY MEDICINE

## 2021-04-15 PROCEDURE — 91300 COVID-19, MRNA, LNP-S, PF, 30 MCG/0.3 ML DOSE VACCINE: CPT | Mod: S$GLB,,, | Performed by: FAMILY MEDICINE

## 2021-04-15 PROCEDURE — 0001A COVID-19, MRNA, LNP-S, PF, 30 MCG/0.3 ML DOSE VACCINE: CPT | Mod: CV19,S$GLB,, | Performed by: FAMILY MEDICINE

## 2021-04-15 PROCEDURE — 91300 COVID-19, MRNA, LNP-S, PF, 30 MCG/0.3 ML DOSE VACCINE: ICD-10-PCS | Mod: S$GLB,,, | Performed by: FAMILY MEDICINE

## 2021-05-06 ENCOUNTER — IMMUNIZATION (OUTPATIENT)
Dept: PRIMARY CARE CLINIC | Facility: CLINIC | Age: 46
End: 2021-05-06
Payer: MEDICAID

## 2021-05-06 DIAGNOSIS — Z23 NEED FOR VACCINATION: Primary | ICD-10-CM

## 2021-05-06 PROCEDURE — 0002A COVID-19, MRNA, LNP-S, PF, 30 MCG/0.3 ML DOSE VACCINE: CPT | Mod: CV19,S$GLB,, | Performed by: FAMILY MEDICINE

## 2021-05-06 PROCEDURE — 91300 COVID-19, MRNA, LNP-S, PF, 30 MCG/0.3 ML DOSE VACCINE: ICD-10-PCS | Mod: S$GLB,,, | Performed by: FAMILY MEDICINE

## 2021-05-06 PROCEDURE — 0002A COVID-19, MRNA, LNP-S, PF, 30 MCG/0.3 ML DOSE VACCINE: ICD-10-PCS | Mod: CV19,S$GLB,, | Performed by: FAMILY MEDICINE

## 2021-05-06 PROCEDURE — 91300 COVID-19, MRNA, LNP-S, PF, 30 MCG/0.3 ML DOSE VACCINE: CPT | Mod: S$GLB,,, | Performed by: FAMILY MEDICINE

## 2022-11-23 PROCEDURE — 96376 TX/PRO/DX INJ SAME DRUG ADON: CPT

## 2022-11-23 PROCEDURE — 99285 EMERGENCY DEPT VISIT HI MDM: CPT | Mod: 25

## 2022-11-23 PROCEDURE — 96375 TX/PRO/DX INJ NEW DRUG ADDON: CPT

## 2022-11-23 PROCEDURE — 96374 THER/PROPH/DIAG INJ IV PUSH: CPT

## 2022-11-24 ENCOUNTER — HOSPITAL ENCOUNTER (EMERGENCY)
Facility: HOSPITAL | Age: 47
Discharge: HOME | End: 2022-11-24
Attending: EMERGENCY MEDICINE
Payer: MEDICAID

## 2022-11-24 VITALS
RESPIRATION RATE: 18 BRPM | SYSTOLIC BLOOD PRESSURE: 117 MMHG | WEIGHT: 180 LBS | TEMPERATURE: 98 F | HEIGHT: 62 IN | HEART RATE: 85 BPM | BODY MASS INDEX: 33.13 KG/M2 | DIASTOLIC BLOOD PRESSURE: 65 MMHG | OXYGEN SATURATION: 97 %

## 2022-11-24 DIAGNOSIS — R55 NEAR SYNCOPE: Primary | ICD-10-CM

## 2022-11-24 DIAGNOSIS — R55 SYNCOPE: ICD-10-CM

## 2022-11-24 LAB
ALBUMIN SERPL BCP-MCNC: 4.3 G/DL (ref 3.5–5.2)
ALP SERPL-CCNC: 79 U/L (ref 55–135)
ALT SERPL W/O P-5'-P-CCNC: 22 U/L (ref 10–44)
ANION GAP SERPL CALC-SCNC: 5 MMOL/L (ref 8–16)
AST SERPL-CCNC: 45 U/L (ref 10–40)
B-HCG UR QL: NEGATIVE
BASOPHILS # BLD AUTO: 0.04 K/UL (ref 0–0.2)
BASOPHILS NFR BLD: 0.4 % (ref 0–1.9)
BILIRUB SERPL-MCNC: 1.9 MG/DL (ref 0.1–1)
BILIRUB UR QL STRIP: NEGATIVE
BUN SERPL-MCNC: 8 MG/DL (ref 6–20)
CALCIUM SERPL-MCNC: 9.7 MG/DL (ref 8.7–10.5)
CHLORIDE SERPL-SCNC: 104 MMOL/L (ref 95–110)
CLARITY UR: CLEAR
CO2 SERPL-SCNC: 27 MMOL/L (ref 23–29)
COLOR UR: YELLOW
CREAT SERPL-MCNC: 0.5 MG/DL (ref 0.5–1.4)
CTP QC/QA: YES
DIFFERENTIAL METHOD: ABNORMAL
EOSINOPHIL # BLD AUTO: 0.1 K/UL (ref 0–0.5)
EOSINOPHIL NFR BLD: 1.1 % (ref 0–8)
ERYTHROCYTE [DISTWIDTH] IN BLOOD BY AUTOMATED COUNT: 18.7 % (ref 11.5–14.5)
EST. GFR  (NO RACE VARIABLE): >60 ML/MIN/1.73 M^2
GLUCOSE SERPL-MCNC: 141 MG/DL (ref 70–110)
GLUCOSE UR QL STRIP: NEGATIVE
HCT VFR BLD AUTO: 25.7 % (ref 37–48.5)
HGB BLD-MCNC: 8.6 G/DL (ref 12–16)
HGB UR QL STRIP: NEGATIVE
IMM GRANULOCYTES # BLD AUTO: 0.04 K/UL (ref 0–0.04)
IMM GRANULOCYTES NFR BLD AUTO: 0.4 % (ref 0–0.5)
KETONES UR QL STRIP: NEGATIVE
LDH SERPL L TO P-CCNC: 508 U/L (ref 110–260)
LEUKOCYTE ESTERASE UR QL STRIP: NEGATIVE
LYMPHOCYTES # BLD AUTO: 2.5 K/UL (ref 1–4.8)
LYMPHOCYTES NFR BLD: 25.2 % (ref 18–48)
MCH RBC QN AUTO: 31.9 PG (ref 27–31)
MCHC RBC AUTO-ENTMCNC: 33.5 G/DL (ref 32–36)
MCV RBC AUTO: 95 FL (ref 82–98)
MONOCYTES # BLD AUTO: 1 K/UL (ref 0.3–1)
MONOCYTES NFR BLD: 9.9 % (ref 4–15)
NEUTROPHILS # BLD AUTO: 6.1 K/UL (ref 1.8–7.7)
NEUTROPHILS NFR BLD: 63 % (ref 38–73)
NITRITE UR QL STRIP: NEGATIVE
NRBC BLD-RTO: 2 /100 WBC
PH UR STRIP: 8 [PH] (ref 5–8)
PLATELET # BLD AUTO: 420 K/UL (ref 150–450)
PMV BLD AUTO: 10.7 FL (ref 9.2–12.9)
POTASSIUM SERPL-SCNC: 3.9 MMOL/L (ref 3.5–5.1)
PROT SERPL-MCNC: 8.8 G/DL (ref 6–8.4)
PROT UR QL STRIP: NEGATIVE
RBC # BLD AUTO: 2.7 M/UL (ref 4–5.4)
RETICS/RBC NFR AUTO: 6.6 % (ref 0.5–2.5)
SODIUM SERPL-SCNC: 136 MMOL/L (ref 136–145)
SP GR UR STRIP: 1 (ref 1–1.03)
TROPONIN I SERPL HS-MCNC: 9.7 PG/ML (ref 0–14.9)
URN SPEC COLLECT METH UR: ABNORMAL
UROBILINOGEN UR STRIP-ACNC: ABNORMAL EU/DL
WBC # BLD AUTO: 9.71 K/UL (ref 3.9–12.7)

## 2022-11-24 PROCEDURE — 63600175 PHARM REV CODE 636 W HCPCS: Performed by: EMERGENCY MEDICINE

## 2022-11-24 PROCEDURE — 84484 ASSAY OF TROPONIN QUANT: CPT | Performed by: EMERGENCY MEDICINE

## 2022-11-24 PROCEDURE — 81003 URINALYSIS AUTO W/O SCOPE: CPT | Performed by: EMERGENCY MEDICINE

## 2022-11-24 PROCEDURE — 83615 LACTATE (LD) (LDH) ENZYME: CPT | Performed by: EMERGENCY MEDICINE

## 2022-11-24 PROCEDURE — 85045 AUTOMATED RETICULOCYTE COUNT: CPT | Performed by: EMERGENCY MEDICINE

## 2022-11-24 PROCEDURE — 63600175 PHARM REV CODE 636 W HCPCS: Performed by: STUDENT IN AN ORGANIZED HEALTH CARE EDUCATION/TRAINING PROGRAM

## 2022-11-24 PROCEDURE — 81025 URINE PREGNANCY TEST: CPT | Performed by: STUDENT IN AN ORGANIZED HEALTH CARE EDUCATION/TRAINING PROGRAM

## 2022-11-24 PROCEDURE — 93010 EKG 12-LEAD: ICD-10-PCS | Mod: ,,, | Performed by: SPECIALIST

## 2022-11-24 PROCEDURE — 93010 ELECTROCARDIOGRAM REPORT: CPT | Mod: ,,, | Performed by: SPECIALIST

## 2022-11-24 PROCEDURE — 85025 COMPLETE CBC W/AUTO DIFF WBC: CPT | Performed by: EMERGENCY MEDICINE

## 2022-11-24 PROCEDURE — 25000003 PHARM REV CODE 250: Performed by: STUDENT IN AN ORGANIZED HEALTH CARE EDUCATION/TRAINING PROGRAM

## 2022-11-24 PROCEDURE — 93005 ELECTROCARDIOGRAM TRACING: CPT | Performed by: SPECIALIST

## 2022-11-24 PROCEDURE — 80053 COMPREHEN METABOLIC PANEL: CPT | Performed by: EMERGENCY MEDICINE

## 2022-11-24 RX ORDER — HYDROMORPHONE HYDROCHLORIDE 1 MG/ML
1 INJECTION, SOLUTION INTRAMUSCULAR; INTRAVENOUS; SUBCUTANEOUS
Status: COMPLETED | OUTPATIENT
Start: 2022-11-24 | End: 2022-11-24

## 2022-11-24 RX ORDER — BUTALBITAL, ACETAMINOPHEN AND CAFFEINE 50; 325; 40 MG/1; MG/1; MG/1
1 TABLET ORAL EVERY 4 HOURS PRN
Status: DISCONTINUED | OUTPATIENT
Start: 2022-11-24 | End: 2022-11-24

## 2022-11-24 RX ADMIN — HYDROMORPHONE HYDROCHLORIDE 1 MG: 1 INJECTION, SOLUTION INTRAMUSCULAR; INTRAVENOUS; SUBCUTANEOUS at 02:11

## 2022-11-24 RX ADMIN — SODIUM CHLORIDE, SODIUM LACTATE, POTASSIUM CHLORIDE, AND CALCIUM CHLORIDE 2000 ML: .6; .31; .03; .02 INJECTION, SOLUTION INTRAVENOUS at 01:11

## 2022-11-24 RX ADMIN — HYDROMORPHONE HYDROCHLORIDE 1 MG: 1 INJECTION, SOLUTION INTRAMUSCULAR; INTRAVENOUS; SUBCUTANEOUS at 05:11

## 2022-11-24 RX ADMIN — PROMETHAZINE HYDROCHLORIDE 12.5 MG: 25 INJECTION INTRAMUSCULAR; INTRAVENOUS at 02:11

## 2022-11-24 NOTE — ED PROVIDER NOTES
Encounter Date: 11/23/2022       History     Chief Complaint   Patient presents with    Near Syncope     47-year-old female with a history of sickle cell disease, bilateral pulmonary embolisms who presents to the emergency department with complaints of near syncopal episode earlier today.  Patient states that she was in her kitchen cooking gumbo she suddenly felt lightheaded needs to sit down.  She became a little bit better after sitting, but when she stood up again, felt a heat wave from the back of her head extending down to her lower extremities.  This had been going on for a few minutes before she called the paramedics.  States that she is been eating and drinking very minimally today because she was not feeling hungry.  Was feeling a bit colder and put on jackets, as any cough or congestion.  No head trauma noted.  Denies drinking, taking any illicit drug use.  She does take heavy opioids due to sickle cell including OxyContin extended release tablets.  Denies chest pain or palpitations.  No shortness of breath.      Review of patient's allergies indicates:  No Known Allergies  Past Medical History:   Diagnosis Date    Anticoagulant long-term use      No past surgical history on file.  No family history on file.  Social History     Tobacco Use    Smoking status: Never    Smokeless tobacco: Never   Substance Use Topics    Alcohol use: Not Currently    Drug use: Not Currently     Review of Systems   Constitutional:  Negative for fever.   HENT:  Negative for sore throat.    Respiratory:  Negative for shortness of breath.    Cardiovascular:  Negative for chest pain.   Gastrointestinal:  Negative for nausea.   Genitourinary:  Negative for dysuria.   Musculoskeletal:  Negative for back pain.   Skin:  Negative for rash.   Neurological:  Positive for syncope and light-headedness. Negative for weakness.   Hematological:  Does not bruise/bleed easily.     Physical Exam     Initial Vitals [11/24/22 0012]   BP Pulse Resp  Temp SpO2   (!) 160/72 (!) 120 20 98.2 °F (36.8 °C) 97 %      MAP       --         Physical Exam    Nursing note and vitals reviewed.  Constitutional: She appears well-developed and well-nourished.   HENT:   Head: Normocephalic and atraumatic.   Eyes: EOM are normal. Pupils are equal, round, and reactive to light.   Neck:   Normal range of motion.  Cardiovascular:  Normal rate and regular rhythm.     Exam reveals no friction rub.       No murmur heard.  Pulmonary/Chest: Breath sounds normal. No respiratory distress. She has no wheezes. She has no rales.   Abdominal: Abdomen is soft. Bowel sounds are normal. She exhibits no distension. There is no abdominal tenderness.   Musculoskeletal:         General: Normal range of motion.      Cervical back: Normal range of motion.     Neurological: She is alert and oriented to person, place, and time.   No focal neuro deficits noted on neuro examination, strength is 5/5 in upper and lower extremities bilaterally, 2+ pulses throughout.   Skin: Skin is warm and dry.       ED Course   Procedures  Labs Reviewed   CBC W/ AUTO DIFFERENTIAL   COMPREHENSIVE METABOLIC PANEL   RETICULOCYTES   URINALYSIS, REFLEX TO URINE CULTURE   TROPONIN I HIGH SENSITIVITY   HAPTOGLOBIN   LACTATE DEHYDROGENASE   LACTIC ACID, PLASMA   POCT URINE PREGNANCY          Imaging Results    None          Medications   butalbital-acetaminophen-caffeine -40 mg per tablet 1 tablet (has no administration in time range)   lactated ringers bolus 2,000 mL (has no administration in time range)     Medical Decision Making:   Initial Assessment:   47-year-old female with a history of sickle cell disease and bilateral pulmonary embolisms coming here for a near syncopal episode.  Elevated reticulocyte count and LDH, however patient not to sickle cell crisis episode, per the patient this is not feel like her usual sickle cell pain.  Gave her 1 mg of Dilaudid and Phenergan, patient stated that there is no resolution of  her pain.  She is requesting more opioids at this time.  2 L of fluid still ongoing.  Heart rate is back down to normal sinus rhythm, blood pressure and systolic of 120s.  Orthostatic vital signs were completed.  CT head are negative for any acute abnormalities.  I believe her issues are mainly due to dehydration, patient will be signed out to Dr. Buckner for further workup and management.                        Clinical Impression:   Final diagnoses:  [R55] Syncope               Kristina Pandya MD  Resident  11/24/22 [id number]

## 2023-04-01 ENCOUNTER — HOSPITAL ENCOUNTER (INPATIENT)
Facility: HOSPITAL | Age: 48
LOS: 3 days | Discharge: HOME | DRG: 812 | End: 2023-04-04
Attending: EMERGENCY MEDICINE | Admitting: HOSPITALIST
Payer: MEDICAID

## 2023-04-01 DIAGNOSIS — R07.9 CHEST PAIN: ICD-10-CM

## 2023-04-01 DIAGNOSIS — R52 PAIN: ICD-10-CM

## 2023-04-01 DIAGNOSIS — D57.00 SICKLE CELL PAIN CRISIS: Primary | ICD-10-CM

## 2023-04-01 DIAGNOSIS — G43.109 MIGRAINE WITH AURA AND WITHOUT STATUS MIGRAINOSUS, NOT INTRACTABLE: ICD-10-CM

## 2023-04-01 DIAGNOSIS — K59.09 OTHER CONSTIPATION: ICD-10-CM

## 2023-04-01 PROBLEM — G43.909 MIGRAINE: Status: ACTIVE | Noted: 2023-04-01

## 2023-04-01 PROBLEM — E66.9 CLASS 2 OBESITY IN ADULT: Chronic | Status: ACTIVE | Noted: 2023-04-01

## 2023-04-01 PROBLEM — F11.90 CHRONIC, CONTINUOUS USE OF OPIOIDS: Chronic | Status: ACTIVE | Noted: 2023-04-01

## 2023-04-01 PROBLEM — E66.812 CLASS 2 OBESITY IN ADULT: Chronic | Status: ACTIVE | Noted: 2023-04-01

## 2023-04-01 PROBLEM — F41.9 ANXIETY: Chronic | Status: ACTIVE | Noted: 2023-04-01

## 2023-04-01 LAB
ALBUMIN SERPL BCP-MCNC: 4 G/DL (ref 3.5–5.2)
ALP SERPL-CCNC: 74 U/L (ref 55–135)
ALT SERPL W/O P-5'-P-CCNC: 19 U/L (ref 10–44)
ANION GAP SERPL CALC-SCNC: 8 MMOL/L (ref 8–16)
ANISOCYTOSIS BLD QL SMEAR: ABNORMAL
AST SERPL-CCNC: 30 U/L (ref 10–40)
BASOPHILS NFR BLD: 0 % (ref 0–1.9)
BILIRUB SERPL-MCNC: 1.6 MG/DL (ref 0.1–1)
BNP SERPL-MCNC: 18 PG/ML (ref 0–99)
BUN SERPL-MCNC: 7 MG/DL (ref 6–20)
CALCIUM SERPL-MCNC: 8.7 MG/DL (ref 8.7–10.5)
CHLORIDE SERPL-SCNC: 104 MMOL/L (ref 95–110)
CO2 SERPL-SCNC: 27 MMOL/L (ref 23–29)
CREAT SERPL-MCNC: 0.6 MG/DL (ref 0.5–1.4)
DIFFERENTIAL METHOD: ABNORMAL
EOSINOPHIL NFR BLD: 1 % (ref 0–8)
ERYTHROCYTE [DISTWIDTH] IN BLOOD BY AUTOMATED COUNT: 19.5 % (ref 11.5–14.5)
EST. GFR  (NO RACE VARIABLE): >60 ML/MIN/1.73 M^2
GLUCOSE SERPL-MCNC: 119 MG/DL (ref 70–110)
HCT VFR BLD AUTO: 23.8 % (ref 37–48.5)
HGB BLD-MCNC: 7.8 G/DL (ref 12–16)
IMM GRANULOCYTES # BLD AUTO: ABNORMAL K/UL (ref 0–0.04)
IMM GRANULOCYTES NFR BLD AUTO: ABNORMAL % (ref 0–0.5)
LYMPHOCYTES NFR BLD: 47 % (ref 18–48)
MAGNESIUM SERPL-MCNC: 1.8 MG/DL (ref 1.6–2.6)
MCH RBC QN AUTO: 31.7 PG (ref 27–31)
MCHC RBC AUTO-ENTMCNC: 32.8 G/DL (ref 32–36)
MCV RBC AUTO: 97 FL (ref 82–98)
MONOCYTES NFR BLD: 5 % (ref 4–15)
NEUTROPHILS NFR BLD: 46 % (ref 38–73)
NEUTS BAND NFR BLD MANUAL: 1 %
NRBC BLD-RTO: 1 /100 WBC
PLATELET # BLD AUTO: 419 K/UL (ref 150–450)
PLATELET BLD QL SMEAR: ABNORMAL
PMV BLD AUTO: 9.9 FL (ref 9.2–12.9)
POTASSIUM SERPL-SCNC: 3.8 MMOL/L (ref 3.5–5.1)
PROT SERPL-MCNC: 8.1 G/DL (ref 6–8.4)
RBC # BLD AUTO: 2.46 M/UL (ref 4–5.4)
RETICS/RBC NFR AUTO: 7.6 % (ref 0.5–2.5)
SICKLE CELLS BLD QL SMEAR: ABNORMAL
SODIUM SERPL-SCNC: 139 MMOL/L (ref 136–145)
WBC # BLD AUTO: 11.92 K/UL (ref 3.9–12.7)

## 2023-04-01 PROCEDURE — 36415 COLL VENOUS BLD VENIPUNCTURE: CPT | Performed by: INTERNAL MEDICINE

## 2023-04-01 PROCEDURE — 93010 EKG 12-LEAD: ICD-10-PCS | Mod: ,,, | Performed by: INTERNAL MEDICINE

## 2023-04-01 PROCEDURE — 25000003 PHARM REV CODE 250: Performed by: HOSPITALIST

## 2023-04-01 PROCEDURE — 12000002 HC ACUTE/MED SURGE SEMI-PRIVATE ROOM

## 2023-04-01 PROCEDURE — 99285 EMERGENCY DEPT VISIT HI MDM: CPT | Mod: 25

## 2023-04-01 PROCEDURE — 94761 N-INVAS EAR/PLS OXIMETRY MLT: CPT

## 2023-04-01 PROCEDURE — 85045 AUTOMATED RETICULOCYTE COUNT: CPT | Mod: 91 | Performed by: INTERNAL MEDICINE

## 2023-04-01 PROCEDURE — 80053 COMPREHEN METABOLIC PANEL: CPT | Mod: 91 | Performed by: INTERNAL MEDICINE

## 2023-04-01 PROCEDURE — 85027 COMPLETE CBC AUTOMATED: CPT | Performed by: INTERNAL MEDICINE

## 2023-04-01 PROCEDURE — 63600175 PHARM REV CODE 636 W HCPCS: Performed by: HOSPITALIST

## 2023-04-01 PROCEDURE — 25000003 PHARM REV CODE 250: Performed by: INTERNAL MEDICINE

## 2023-04-01 PROCEDURE — 99900035 HC TECH TIME PER 15 MIN (STAT)

## 2023-04-01 PROCEDURE — 93005 ELECTROCARDIOGRAM TRACING: CPT | Performed by: INTERNAL MEDICINE

## 2023-04-01 PROCEDURE — 93010 ELECTROCARDIOGRAM REPORT: CPT | Mod: ,,, | Performed by: INTERNAL MEDICINE

## 2023-04-01 PROCEDURE — 83880 ASSAY OF NATRIURETIC PEPTIDE: CPT | Mod: 91 | Performed by: INTERNAL MEDICINE

## 2023-04-01 PROCEDURE — 63600175 PHARM REV CODE 636 W HCPCS: Performed by: INTERNAL MEDICINE

## 2023-04-01 PROCEDURE — 85007 BL SMEAR W/DIFF WBC COUNT: CPT | Performed by: INTERNAL MEDICINE

## 2023-04-01 PROCEDURE — 27000221 HC OXYGEN, UP TO 24 HOURS

## 2023-04-01 PROCEDURE — 83735 ASSAY OF MAGNESIUM: CPT | Mod: 91 | Performed by: INTERNAL MEDICINE

## 2023-04-01 RX ORDER — ALPRAZOLAM 1 MG/1
1 TABLET ORAL NIGHTLY PRN
COMMUNITY
Start: 2023-02-23

## 2023-04-01 RX ORDER — HYDROMORPHONE HYDROCHLORIDE 1 MG/ML
1 INJECTION, SOLUTION INTRAMUSCULAR; INTRAVENOUS; SUBCUTANEOUS EVERY 4 HOURS PRN
Status: DISCONTINUED | OUTPATIENT
Start: 2023-04-01 | End: 2023-04-02

## 2023-04-01 RX ORDER — DIPHENHYDRAMINE HYDROCHLORIDE 50 MG/ML
12.5 INJECTION INTRAMUSCULAR; INTRAVENOUS EVERY 6 HOURS PRN
Status: DISCONTINUED | OUTPATIENT
Start: 2023-04-01 | End: 2023-04-04 | Stop reason: HOSPADM

## 2023-04-01 RX ORDER — METOCLOPRAMIDE HYDROCHLORIDE 5 MG/ML
10 INJECTION INTRAMUSCULAR; INTRAVENOUS
Status: COMPLETED | OUTPATIENT
Start: 2023-04-01 | End: 2023-04-02

## 2023-04-01 RX ORDER — ENOXAPARIN SODIUM 100 MG/ML
40 INJECTION SUBCUTANEOUS EVERY 24 HOURS
Status: DISCONTINUED | OUTPATIENT
Start: 2023-04-01 | End: 2023-04-04 | Stop reason: HOSPADM

## 2023-04-01 RX ORDER — HYDROMORPHONE HYDROCHLORIDE 1 MG/ML
2 INJECTION, SOLUTION INTRAMUSCULAR; INTRAVENOUS; SUBCUTANEOUS EVERY 4 HOURS PRN
Status: DISCONTINUED | OUTPATIENT
Start: 2023-04-01 | End: 2023-04-01

## 2023-04-01 RX ORDER — PROMETHAZINE HYDROCHLORIDE 25 MG/ML
INJECTION, SOLUTION INTRAMUSCULAR; INTRAVENOUS
Status: DISPENSED
Start: 2023-04-01 | End: 2023-04-01

## 2023-04-01 RX ORDER — DIPHENHYDRAMINE HYDROCHLORIDE 50 MG/ML
INJECTION INTRAMUSCULAR; INTRAVENOUS
Status: DISPENSED
Start: 2023-04-01 | End: 2023-04-01

## 2023-04-01 RX ORDER — MORPHINE SULFATE 30 MG/1
30 TABLET, FILM COATED, EXTENDED RELEASE ORAL EVERY 12 HOURS
Status: DISCONTINUED | OUTPATIENT
Start: 2023-04-01 | End: 2023-04-04 | Stop reason: HOSPADM

## 2023-04-01 RX ORDER — ACETAMINOPHEN 325 MG/1
650 TABLET ORAL EVERY 8 HOURS PRN
Status: DISCONTINUED | OUTPATIENT
Start: 2023-04-01 | End: 2023-04-04 | Stop reason: HOSPADM

## 2023-04-01 RX ORDER — HYDROMORPHONE HYDROCHLORIDE 1 MG/ML
INJECTION, SOLUTION INTRAMUSCULAR; INTRAVENOUS; SUBCUTANEOUS
Status: DISPENSED
Start: 2023-04-01 | End: 2023-04-01

## 2023-04-01 RX ORDER — ONDANSETRON 2 MG/ML
4 INJECTION INTRAMUSCULAR; INTRAVENOUS EVERY 8 HOURS PRN
Status: DISCONTINUED | OUTPATIENT
Start: 2023-04-01 | End: 2023-04-04 | Stop reason: HOSPADM

## 2023-04-01 RX ORDER — DIPHENHYDRAMINE HYDROCHLORIDE 50 MG/ML
12.5 INJECTION INTRAMUSCULAR; INTRAVENOUS
Status: COMPLETED | OUTPATIENT
Start: 2023-04-01 | End: 2023-04-01

## 2023-04-01 RX ORDER — FOLIC ACID 1 MG/1
1 TABLET ORAL DAILY
Status: DISCONTINUED | OUTPATIENT
Start: 2023-04-01 | End: 2023-04-04 | Stop reason: HOSPADM

## 2023-04-01 RX ORDER — ZOLPIDEM TARTRATE 5 MG/1
10 TABLET ORAL NIGHTLY
Status: DISCONTINUED | OUTPATIENT
Start: 2023-04-01 | End: 2023-04-04 | Stop reason: HOSPADM

## 2023-04-01 RX ORDER — TOPIRAMATE 25 MG/1
50 TABLET ORAL 2 TIMES DAILY
Status: DISCONTINUED | OUTPATIENT
Start: 2023-04-01 | End: 2023-04-02

## 2023-04-01 RX ORDER — HYDROXYUREA 500 MG/1
1000 CAPSULE ORAL DAILY
Status: DISCONTINUED | OUTPATIENT
Start: 2023-04-01 | End: 2023-04-04 | Stop reason: HOSPADM

## 2023-04-01 RX ORDER — BUTALBITAL, ACETAMINOPHEN AND CAFFEINE 50; 325; 40 MG/1; MG/1; MG/1
1 TABLET ORAL EVERY 4 HOURS PRN
Status: DISCONTINUED | OUTPATIENT
Start: 2023-04-01 | End: 2023-04-04 | Stop reason: HOSPADM

## 2023-04-01 RX ORDER — SODIUM CHLORIDE 0.9 % (FLUSH) 0.9 %
10 SYRINGE (ML) INJECTION
Status: DISCONTINUED | OUTPATIENT
Start: 2023-04-01 | End: 2023-04-04 | Stop reason: HOSPADM

## 2023-04-01 RX ORDER — MULTIVITAMIN
1 TABLET ORAL DAILY
COMMUNITY

## 2023-04-01 RX ORDER — ONDANSETRON 2 MG/ML
INJECTION INTRAMUSCULAR; INTRAVENOUS
Status: DISPENSED
Start: 2023-04-01 | End: 2023-04-01

## 2023-04-01 RX ORDER — TALC
6 POWDER (GRAM) TOPICAL NIGHTLY PRN
Status: DISCONTINUED | OUTPATIENT
Start: 2023-04-01 | End: 2023-04-04 | Stop reason: HOSPADM

## 2023-04-01 RX ORDER — AMOXICILLIN 250 MG
2 CAPSULE ORAL 2 TIMES DAILY
Status: DISCONTINUED | OUTPATIENT
Start: 2023-04-01 | End: 2023-04-04 | Stop reason: HOSPADM

## 2023-04-01 RX ORDER — MORPHINE SULFATE 30 MG/1
30 TABLET, FILM COATED, EXTENDED RELEASE ORAL 2 TIMES DAILY PRN
COMMUNITY
Start: 2023-03-30

## 2023-04-01 RX ORDER — SODIUM CHLORIDE 9 MG/ML
INJECTION, SOLUTION INTRAVENOUS CONTINUOUS
Status: DISCONTINUED | OUTPATIENT
Start: 2023-04-01 | End: 2023-04-04

## 2023-04-01 RX ORDER — OXYCODONE HYDROCHLORIDE 5 MG/1
15 TABLET ORAL EVERY 6 HOURS PRN
Status: DISCONTINUED | OUTPATIENT
Start: 2023-04-01 | End: 2023-04-04 | Stop reason: HOSPADM

## 2023-04-01 RX ADMIN — METOCLOPRAMIDE 10 MG: 5 INJECTION, SOLUTION INTRAMUSCULAR; INTRAVENOUS at 04:04

## 2023-04-01 RX ADMIN — DIPHENHYDRAMINE HYDROCHLORIDE 12.5 MG: 50 INJECTION, SOLUTION INTRAMUSCULAR; INTRAVENOUS at 10:04

## 2023-04-01 RX ADMIN — MORPHINE SULFATE 30 MG: 30 TABLET, EXTENDED RELEASE ORAL at 09:04

## 2023-04-01 RX ADMIN — HYDROMORPHONE HYDROCHLORIDE 2 MG: 1 INJECTION, SOLUTION INTRAMUSCULAR; INTRAVENOUS; SUBCUTANEOUS at 01:04

## 2023-04-01 RX ADMIN — SODIUM CHLORIDE: 0.9 INJECTION, SOLUTION INTRAVENOUS at 07:04

## 2023-04-01 RX ADMIN — TOPIRAMATE 50 MG: 25 TABLET, FILM COATED ORAL at 09:04

## 2023-04-01 RX ADMIN — SENNOSIDES AND DOCUSATE SODIUM 2 TABLET: 50; 8.6 TABLET ORAL at 09:04

## 2023-04-01 RX ADMIN — HYDROMORPHONE HYDROCHLORIDE 1 MG: 1 INJECTION, SOLUTION INTRAMUSCULAR; INTRAVENOUS; SUBCUTANEOUS at 09:04

## 2023-04-01 RX ADMIN — ENOXAPARIN SODIUM 40 MG: 100 INJECTION SUBCUTANEOUS at 04:04

## 2023-04-01 RX ADMIN — HYDROMORPHONE HYDROCHLORIDE 2 MG: 1 INJECTION, SOLUTION INTRAMUSCULAR; INTRAVENOUS; SUBCUTANEOUS at 09:04

## 2023-04-01 RX ADMIN — PROMETHAZINE HYDROCHLORIDE 12.5 MG: 25 INJECTION INTRAMUSCULAR; INTRAVENOUS at 09:04

## 2023-04-01 RX ADMIN — DIPHENHYDRAMINE HYDROCHLORIDE 12.5 MG: 50 INJECTION, SOLUTION INTRAMUSCULAR; INTRAVENOUS at 04:04

## 2023-04-01 RX ADMIN — ZOLPIDEM TARTRATE 10 MG: 5 TABLET, COATED ORAL at 09:04

## 2023-04-01 RX ADMIN — HYDROXYUREA 1000 MG: 500 CAPSULE ORAL at 12:04

## 2023-04-01 RX ADMIN — FOLIC ACID 1 MG: 1 TABLET ORAL at 09:04

## 2023-04-01 RX ADMIN — METOCLOPRAMIDE 10 MG: 5 INJECTION, SOLUTION INTRAMUSCULAR; INTRAVENOUS at 10:04

## 2023-04-01 NOTE — NURSING
Nurses Note -- 4 Eyes      4/1/2023   11:09 AM      Skin assessed during: Admit      [x] No Pressure Injuries Present    []Prevention Measures Documented      [] Yes- Altered Skin Integrity Present or Discovered   [] LDA Added if Not in Epic (Describe Wound)   [] New Altered Skin Integrity was Present on Admit and Documented in LDA   [] Wound Image Taken    Wound Care Consulted? No    Attending Nurse:  Durga Rosario RN     Second RN/Staff Member:  Chloe Herrera RN

## 2023-04-01 NOTE — SUBJECTIVE & OBJECTIVE
Past Medical History:   Diagnosis Date    Anticoagulant long-term use        No past surgical history on file.    Review of patient's allergies indicates:  No Known Allergies    No current facility-administered medications on file prior to encounter.     Current Outpatient Medications on File Prior to Encounter   Medication Sig    ALPRAZolam (XANAX) 0.25 MG tablet Take 1 tablet by mouth once daily.    cyclobenzaprine (FLEXERIL) 10 MG tablet Take 1 tablet by mouth 2 (two) times daily as needed.    folic acid (FOLVITE) 1 MG tablet Take 1 tablet (1 mg total) by mouth once daily.    hydroxyurea (HYDREA) 500 mg Cap Take 1,000 mg by mouth once daily.    oxyCODONE (ROXICODONE) 15 MG Tab Take 15 mg by mouth every 4 (four) hours as needed.    OXYCONTIN 30 mg TR12 12 hr tablet Take 1 tablet by mouth 2 (two) times daily.    topiramate (TOPAMAX) 50 MG tablet Take 50 mg by mouth 2 (two) times daily.    zolpidem (AMBIEN) 10 mg Tab Take 1 tablet by mouth every evening.     Family History    None       Tobacco Use    Smoking status: Never    Smokeless tobacco: Never   Substance and Sexual Activity    Alcohol use: Not Currently    Drug use: Not Currently    Sexual activity: Not Currently     Review of Systems Complete ROS otherwise negative other than stated in HPI.   Objective:     Vital Signs (Most Recent):    Vital Signs (24h Range):           There is no height or weight on file to calculate BMI.    Physical Exam  GENERAL:  No apparent distress. Alert and oriented x 3.  Obese  HEENT:  EOMI. Conjunctivae intact.  NECK:  Supple   LUNGS:  No respiratory distress. Clear to auscultation bilaterally   CARDIAC:  Tachycardic and regular without murmur, rub or gallop  ABDOMEN:  Positive bowel sounds. Nontender and nondistended.  No rebound or guarding  EXTREMITIES:  Peripheral pulses are 2+. Hands and feet are warm. Good capillary refill in fingers (< 2 seconds). No clubbing, cyanosis or edema      Significant Labs: All pertinent labs  within the past 24 hours have been reviewed.    Significant Imaging: I have reviewed all pertinent imaging results/findings within the past 24 hours.

## 2023-04-01 NOTE — PROGRESS NOTES
[facility]  Progress Note    Patient Name: Sunita Mendez  MRN: [id number]  Patient Class: IP- Inpatient   Admission Date: 4/1/2023  Length of Stay: 0 days  Attending Physician: Deja Fam MD  Primary Care Provider: Primary Doctor No        Subjective:     Principal Problem:Migraine        HPI:  47-year-old woman with sickle cell anemia presenting with pain.  Patient reports she has had sickle cell pain of her whole body for the past 3 weeks progressively worsening despite her home oral pain medication.  She has had severe migraines for the past 1 week consisting of nausea and photosensitivity.  She complains of pain in multiple areas including a twisting pain in her abdomen and lower extremity pain bilaterally.  She is been able to eat without issue, has bowel movements daily, and denies fever, chills, dysuria, cough, sick contacts, difficulty breathing, wheezing, chest pain.  In the ED, she still complains of pain despite IV medications.      Overview/Hospital Course:  Patient with a history of migraine headache, on topiramate, states she has a neurology referral at [facility].  Also known history of sickle cell disease, followed by hematologist Dr. Michaels, she is on Hydrea, intermittently taking Endari, on chronic opioid medication, review of  on 2/2/23 morphine 30 mg, 30 day supply, 60 tablets, oxycodone 15 mg, 30 day supply, 180 tablets, 2/23/23 alprazolam 1 mg, 30 day supply, 30 tablets.  She states she began to feel well over the last 3 weeks, states her PCP checked her blood count and recommended possible transfusion but she could not arrange childcare at home, her mother was assisting her, she was staying mostly in bed.  Over the last few days migraine returned, not improved which prompted ED presentation.  Reports 1 year history of intermittent twisting abdominal pain, having regular bowel movements.  Afebrile, labs with hemoglobin 7.6, T bilirubin 1.6, EKG sinus rhythm,  ultrasound no DVT, chest x-ray with concern for vascular prominence, CT head no acute, she was admitted with acute pain crisis.  On 04/01, still with headache, ordered migraine medication including scheduled Benadryl, Reglan, continue IV fluid, Neurology evaluation.      Interval History:  Admitted earlier this morning by overnight team.  See hospital course.    Review of Systems   Constitutional:  Negative for fever.   Respiratory:  Positive for shortness of breath (chronic).    Gastrointestinal:  Positive for nausea.   Neurological:  Positive for headaches.   Objective:     Vital Signs (Most Recent):  Temp: 97.5 °F (36.4 °C) (04/01/23 1205)  Pulse: 78 (04/01/23 1205)  Resp: 18 (04/01/23 1333)  BP: 122/65 (04/01/23 1205)  SpO2: 99 % (04/01/23 1205) Vital Signs (24h Range):  Temp:  [97.5 °F (36.4 °C)-98.7 °F (37.1 °C)] 97.5 °F (36.4 °C)  Pulse:  [] 78  Resp:  [17-19] 18  SpO2:  [95 %-100 %] 99 %  BP: (105-134)/(56-83) 122/65     Weight: 87.8 kg (193 lb 9 oz)  Body mass index is 35.4 kg/m².    Intake/Output Summary (Last 24 hours) at 4/1/2023 1643  Last data filed at 4/1/2023 1505  Gross per 24 hour   Intake 1745 ml   Output --   Net 1745 ml      Physical Exam  Vitals and nursing note reviewed.   Constitutional:       Comments: Lying in bed   HENT:      Head: Normocephalic and atraumatic.   Pulmonary:      Comments: On NC  Abdominal:      Palpations: Abdomen is soft.      Tenderness: There is no abdominal tenderness.   Neurological:      Mental Status: She is alert and oriented to person, place, and time.   Psychiatric:         Mood and Affect: Mood normal.       Significant Labs: BMP:   Recent Labs   Lab 04/01/23  0907   *      K 3.8      CO2 27   BUN 7   CREATININE 0.6   CALCIUM 8.7   MG 1.8     CBC:   Recent Labs   Lab 04/01/23  0907   WBC 11.92   HGB 7.8*   HCT 23.8*        CMP:   Recent Labs   Lab 04/01/23  0907      K 3.8      CO2 27   *   BUN 7   CREATININE  0.6   CALCIUM 8.7   PROT 8.1   ALBUMIN 4.0   BILITOT 1.6*   ALKPHOS 74   AST 30   ALT 19   ANIONGAP 8     Cardiac Markers: No results for input(s): CKMB, MYOGLOBIN, BNP, TROPISTAT in the last 48 hours.  Lactic Acid: No results for input(s): LACTATE in the last 48 hours.  Magnesium:   Recent Labs   Lab 04/01/23  0907   MG 1.8     POCT Glucose: No results for input(s): POCTGLUCOSE in the last 48 hours.    Significant Imaging: I have reviewed all pertinent imaging results/findings within the past 24 hours.  CT Head Without Contrast    Result Date: 4/1/2023  Exam: Unenhanced CT scan of brain INDICATION: Headache TECHNIQUE: Axial images in 5 mm intervals were obtained through the brain without the administration of intravenous contrast material. CMS MANDATED QUALITY DATA - CT RADIATION  436 All CT scans at this facility utilize dose modulation, iterative reconstruction, and/or weight based dosing when appropriate to reduce radiation dose to as low as reasonably achievable. FINDINGS: The gray-white matter demonstrates an unremarkable appearance. There is no evidence of an intracranial hemorrhage. There is no evidence of an intracranial mass. Ventricular system is normal. Basal cisterns are maintained. Posterior fossa is unremarkable. Orbits are symmetric. Calvarium demonstrates an unremarkable appearance. IMPRESSION: Normal exam. Electronically signed by:  Tomer Willams MD  4/1/2023 6:12 AM CDT Workstation: [id number]    X-Ray Chest AP Portable    Result Date: 4/1/2023  CLINICAL HISTORY: 47 years (1975) Female chest pain Chest pain TECHNIQUE: Portable AP radiograph the chest. COMPARISON: Radiograph November 24, 2022. FINDINGS: Mildly increased central hilar interstitial opacities are seen bilaterally suggestive of either mild edema, atypical infection/pneumonia or bronchitis in the appropriate clinical setting. There is blunting of both costophrenic angles consistent with trace pleural effusions and adjacent  atelectasis. No pneumothorax is identified. The heart is mildly enlarged.  Osseous structures show degenerative changes in the spine. The visualized upper abdomen is unremarkable. IMPRESSION: Findings compatible with mild pulmonary vascular congestion/interstitial pulmonary edema as above. . Electronically signed by:  Marin Mckeon MD  4/1/2023 8:40 AM CDT Workstation: [id number]    US Lower Extremity Veins Right    Result Date: 4/1/2023  Exam: Right lower extremity venous Doppler INDICATION: Right lower extremity pain FINDINGS: On grayscale imaging there is compressibility of the common femoral, femoral, and popliteal veins. Color-flow is noted in all venous segments including the tibial veins. Waveforms are normal. IMPRESSION: No evidence of deep vein thrombosis involving the right lower extremity. 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  Will start on Hydrea today 500 mg each morning with repeat CBC and CMP in 2 weeks and recheck in 4 weeks.       Plan:  Continue care on medical floor  Continue IV fluid at 75 cc/hour   Continue continuous oxygen via nasal cannula  Adjusted IV and oral pain control, monitoring  Ordered scheduled Benadryl and Reglan for migraine  Increase activity, ambulate, out of bed to chair   Trending labs  Neurology consulted   Further plan as per hospital course    VTE Risk Mitigation (From admission, onward)           Ordered     IP VTE HIGH RISK PATIENT  Once         04/01/23 0807     Place sequential compression device  Until discontinued         04/01/23 0807                     Discharge Planning   CORRINE:      Code Status: Full Code   Is the patient medically ready for discharge?:     Reason for patient still in hospital (select all that apply): Patient trending condition  Discharge Plan A: Home with family                  Deja Fam MD  Department of Hospital Medicine   [facility]

## 2023-04-01 NOTE — SUBJECTIVE & OBJECTIVE
Interval History:  Admitted earlier this morning by overnight team.  See hospital course.    Review of Systems   Constitutional:  Negative for fever.   Respiratory:  Positive for shortness of breath (chronic).    Gastrointestinal:  Positive for nausea.   Neurological:  Positive for headaches.   Objective:     Vital Signs (Most Recent):  Temp: 97.5 °F (36.4 °C) (04/01/23 1205)  Pulse: 78 (04/01/23 1205)  Resp: 18 (04/01/23 1333)  BP: 122/65 (04/01/23 1205)  SpO2: 99 % (04/01/23 1205) Vital Signs (24h Range):  Temp:  [97.5 °F (36.4 °C)-98.7 °F (37.1 °C)] 97.5 °F (36.4 °C)  Pulse:  [] 78  Resp:  [17-19] 18  SpO2:  [95 %-100 %] 99 %  BP: (105-134)/(56-83) 122/65     Weight: 87.8 kg (193 lb 9 oz)  Body mass index is 35.4 kg/m².    Intake/Output Summary (Last 24 hours) at 4/1/2023 1643  Last data filed at 4/1/2023 1505  Gross per 24 hour   Intake 1745 ml   Output --   Net 1745 ml      Physical Exam  Vitals and nursing note reviewed.   Constitutional:       Comments: Lying in bed   HENT:      Head: Normocephalic and atraumatic.   Pulmonary:      Comments: On NC  Abdominal:      Palpations: Abdomen is soft.      Tenderness: There is no abdominal tenderness.   Neurological:      Mental Status: She is alert and oriented to person, place, and time.   Psychiatric:         Mood and Affect: Mood normal.       Significant Labs: BMP:   Recent Labs   Lab 04/01/23  0907   *      K 3.8      CO2 27   BUN 7   CREATININE 0.6   CALCIUM 8.7   MG 1.8     CBC:   Recent Labs   Lab 04/01/23  0907   WBC 11.92   HGB 7.8*   HCT 23.8*        CMP:   Recent Labs   Lab 04/01/23  0907      K 3.8      CO2 27   *   BUN 7   CREATININE 0.6   CALCIUM 8.7   PROT 8.1   ALBUMIN 4.0   BILITOT 1.6*   ALKPHOS 74   AST 30   ALT 19   ANIONGAP 8     Cardiac Markers: No results for input(s): CKMB, MYOGLOBIN, BNP, TROPISTAT in the last 48 hours.  Lactic Acid: No results for input(s): LACTATE in the last 48  hours.  Magnesium:   Recent Labs   Lab 04/01/23  0907   MG 1.8     POCT Glucose: No results for input(s): POCTGLUCOSE in the last 48 hours.    Significant Imaging: I have reviewed all pertinent imaging results/findings within the past 24 hours.  CT Head Without Contrast    Result Date: 4/1/2023  Exam: Unenhanced CT scan of brain INDICATION: Headache TECHNIQUE: Axial images in 5 mm intervals were obtained through the brain without the administration of intravenous contrast material. CMS MANDATED QUALITY DATA - CT RADIATION  436 All CT scans at this facility utilize dose modulation, iterative reconstruction, and/or weight based dosing when appropriate to reduce radiation dose to as low as reasonably achievable. FINDINGS: The gray-white matter demonstrates an unremarkable appearance. There is no evidence of an intracranial hemorrhage. There is no evidence of an intracranial mass. Ventricular system is normal. Basal cisterns are maintained. Posterior fossa is unremarkable. Orbits are symmetric. Calvarium demonstrates an unremarkable appearance. IMPRESSION: Normal exam. Electronically signed by:  Tomer Willams MD  4/1/2023 6:12 AM CDT Workstation: [id number]    X-Ray Chest AP Portable    Result Date: 4/1/2023  CLINICAL HISTORY: 47 years (1975) Female chest pain Chest pain TECHNIQUE: Portable AP radiograph the chest. COMPARISON: Radiograph November 24, 2022. FINDINGS: Mildly increased central hilar interstitial opacities are seen bilaterally suggestive of either mild edema, atypical infection/pneumonia or bronchitis in the appropriate clinical setting. There is blunting of both costophrenic angles consistent with trace pleural effusions and adjacent atelectasis. No pneumothorax is identified. The heart is mildly enlarged.  Osseous structures show degenerative changes in the spine. The visualized upper abdomen is unremarkable. IMPRESSION: Findings compatible with mild pulmonary vascular congestion/interstitial  pulmonary edema as above. . Electronically signed by:  Marin Mckeon MD  4/1/2023 8:40 AM CDT Workstation: [id number]    US Lower Extremity Veins Right    Result Date: 4/1/2023  Exam: Right lower extremity venous Doppler INDICATION: Right lower extremity pain FINDINGS: On grayscale imaging there is compressibility of the common femoral, femoral, and popliteal veins. Color-flow is noted in all venous segments including the tibial veins. Waveforms are normal. IMPRESSION: No evidence of deep vein thrombosis involving the right lower extremity. Electronically signed by:  Tomer Willams MD  4/1/2023 7:13 AM CDT Workstation: [id number]

## 2023-04-01 NOTE — HOSPITAL COURSE
Patient with a history of migraine headache, on topiramate, states she has a neurology referral at [facility].  Also known history of sickle cell disease, followed by hematologist Dr. Michaels, she is on Hydrea, intermittently taking Endari, on chronic opioid medication, review of  on 2/2/23 morphine 30 mg, 30 day supply, 60 tablets, oxycodone 15 mg, 30 day supply, 180 tablets, 2/23/23 alprazolam 1 mg, 30 day supply, 30 tablets.  She states she began to feel well over the last 3 weeks, states her PCP checked her blood count and recommended possible transfusion but she could not arrange childcare at home, her mother was assisting her, she was staying mostly in bed.  Over the last few days migraine returned, not improved which prompted ED presentation. Afebrile, labs with hemoglobin 7.6, T bilirubin 1.6, EKG sinus rhythm, ultrasound no DVT, chest x-ray with concern for vascular prominence, CT head no acute, she was admitted with acute pain crisis as well as migraine.  On 04/01, still with headache, ordered migraine medication including scheduled Benadryl, Reglan, continue IV fluid, Neurology evaluation.  On 04/02 headache still present, nausea with no emesis, MRI brain no acute, CTA negative, Neurology started on intravenous Depacon, dose of dexamethasone, 2 g IV magnesium, as well as increasing home topiramate to 100 mg b.i.d..  She was continued to be monitored in the hospital.  She symptomatically improved, remained without any neurological deficits, tolerating oral diet, ambulating in hallway.  Appears medically stable for discharge, cleared by consultant for discharge with outpatient follow-up.  Discussed with neurology on day of discharge, will continue increased dose of topiramate with outpatient follow-up.  Discharge plan including medication, follow-up as well as return precautions were reviewed with the patient, she expressed understanding, no questions or concerns.      Discharge examination   Sitting in bed,  alert, oriented, regular heart rhythm, no focal deficits

## 2023-04-01 NOTE — ASSESSMENT & PLAN NOTE
She has chronic migraine.  Has been having migraines for the past week.  Fioricet and follow up CT head

## 2023-04-01 NOTE — CARE UPDATE
04/01/23 1058   Patient Assessment/Suction   Level of Consciousness (AVPU) alert   Respiratory Effort Unlabored   PRE-TX-O2   Device (Oxygen Therapy) nasal cannula   $ Is the patient on Low Flow Oxygen? Yes   Flow (L/min) 2   SpO2 95 %   Pulse Oximetry Type Intermittent   $ Pulse Oximetry - Multiple Charge Pulse Oximetry - Multiple   Pulse 102   Resp 18   Respiratory Evaluation   $ Care Plan Tech Time 15 min

## 2023-04-01 NOTE — HPI
47-year-old woman with sickle cell anemia presenting with pain.  Patient reports she has had sickle cell pain of her whole body for the past 3 weeks progressively worsening despite her home oral pain medication.  She has had severe migraines for the past 1 week consisting of nausea and photosensitivity.  She complains of pain in multiple areas including a twisting pain in her abdomen and lower extremity pain bilaterally.  She is been able to eat without issue, has bowel movements daily, and denies fever, chills, dysuria, cough, sick contacts, difficulty breathing, wheezing, chest pain.  In the ED, she still complains of pain despite IV medications.

## 2023-04-01 NOTE — PLAN OF CARE
[facility]  Initial Discharge Assessment       Primary Care Provider: Primary Doctor No    Admission Diagnosis: Sickle cell pain crisis [D57.00]  Sickle cell pain crisis [D57.00]    Admission Date: 4/1/2023  Expected Discharge Date:     Discharge assessment completed with patient and family at bedside. Information verified as correct on facesheet. Patient reports independent in ADLs. Denies HH/HD/DME at home/coumadin. Discharge plan is home. Family to provide transport on discharge. No discharge needs anticipated at this time.     Discharge Barriers Identified: None    Payor: MEDICAID / Plan: [place] [employer] CONNECT / Product Type: Managed Medicaid /     Extended Emergency Contact Information  Primary Emergency Contact: Alisa Carty  Mobile Phone: [phone]  Relation: Mother  Preferred language: English   needed? No    Discharge Plan A: Home with family  Discharge Plan B: Home      [id number] - [place] - [address] AT [facility] [address]  [address] [address]  Phone: [phone] Fax: [phone]      Initial Assessment (most recent)       Adult Discharge Assessment - 04/01/23 1208          Discharge Assessment    Assessment Type Discharge Planning Assessment     Confirmed/corrected address, phone number and insurance Yes     Confirmed Demographics Correct on Facesheet     Source of Information patient;family     Reason For Admission sickle cell crisis     People in Home child(leonela), dependent     Facility Arrived From: home     Do you expect to return to your current living situation? Yes     Do you have help at home or someone to help you manage your care at home? Yes     Who are your caregiver(s) and their phone number(s)? independent in care/sons     Prior to hospitilization cognitive status: Alert/Oriented     Current cognitive status: Alert/Oriented     Equipment Currently Used at Home none     Readmission within 30  days? No     Patient currently being followed by outpatient case management? No     Do you currently have service(s) that help you manage your care at home? No     Do you take prescription medications? Yes     Do you have prescription coverage? Yes     Coverage [place] medicaid     Do you have any problems affording any of your prescribed medications? No     Is the patient taking medications as prescribed? yes     Who is going to help you get home at discharge? family/mother     How do you get to doctors appointments? car, drives self     Are you on dialysis? No     Do you take coumadin? No     Discharge Plan A Home with family     Discharge Plan B Home     DME Needed Upon Discharge  none     Discharge Plan discussed with: Patient;Adult children;Parent(s)     Discharge Barriers Identified None

## 2023-04-01 NOTE — ASSESSMENT & PLAN NOTE
Admit for IV fluids, oxygen, IV pain control with Dilaudid, Phenergan for nausea.  Resume her home long-acting morphine and folic acid and Hydrea.  Monitor CBC

## 2023-04-01 NOTE — H&P
Former[facility] - Emergency Dept  Hospital Medicine  History & Physical    Patient Name: Sunita Mendez  MRN: [id number]  Patient Class: IP- Inpatient  Admission Date: 4/1/2023  Attending Physician: Janette Bautista MD  Primary Care Provider: Primary Doctor No        Patient seen at 5:45 a.m. on 04/01/2023  Patient information was obtained from patient and ER records.     Subjective:     Principal Problem:Sickle cell crisis    Chief Complaint:   Chief Complaint   Patient presents with    Chest Pain        HPI: 47-year-old woman with sickle cell anemia presenting with pain.  Patient reports she has had sickle cell pain of her whole body for the past 3 weeks progressively worsening despite her home oral pain medication.  She has had severe migraines for the past 1 week consisting of nausea and photosensitivity.  She complains of pain in multiple areas including a twisting pain in her abdomen and lower extremity pain bilaterally.  She is been able to eat without issue, has bowel movements daily, and denies fever, chills, dysuria, cough, sick contacts, difficulty breathing, wheezing, chest pain.  In the ED, she still complains of pain despite IV medications.      Past Medical History:   Diagnosis Date    Anticoagulant long-term use        No past surgical history on file.    Review of patient's allergies indicates:  No Known Allergies    No current facility-administered medications on file prior to encounter.     Current Outpatient Medications on File Prior to Encounter   Medication Sig    ALPRAZolam (XANAX) 0.25 MG tablet Take 1 tablet by mouth once daily.    cyclobenzaprine (FLEXERIL) 10 MG tablet Take 1 tablet by mouth 2 (two) times daily as needed.    folic acid (FOLVITE) 1 MG tablet Take 1 tablet (1 mg total) by mouth once daily.    hydroxyurea (HYDREA) 500 mg Cap Take 1,000 mg by mouth once daily.    oxyCODONE (ROXICODONE) 15 MG Tab Take 15 mg by mouth every 4 (four) hours as needed.    OXYCONTIN 30 mg  TR12 12 hr tablet Take 1 tablet by mouth 2 (two) times daily.    topiramate (TOPAMAX) 50 MG tablet Take 50 mg by mouth 2 (two) times daily.    zolpidem (AMBIEN) 10 mg Tab Take 1 tablet by mouth every evening.     Family History    None       Tobacco Use    Smoking status: Never    Smokeless tobacco: Never   Substance and Sexual Activity    Alcohol use: Not Currently    Drug use: Not Currently    Sexual activity: Not Currently     Review of Systems Complete ROS otherwise negative other than stated in HPI.   Objective:     Vital Signs (Most Recent):    Vital Signs (24h Range):           There is no height or weight on file to calculate BMI.    Physical Exam  GENERAL:  No apparent distress. Alert and oriented x 3.  Obese  HEENT:  EOMI. Conjunctivae intact.  NECK:  Supple   LUNGS:  No respiratory distress. Clear to auscultation bilaterally   CARDIAC:  Tachycardic and regular without murmur, rub or gallop  ABDOMEN:  Positive bowel sounds. Nontender and nondistended.  No rebound or guarding  EXTREMITIES:  Peripheral pulses are 2+. Hands and feet are warm. Good capillary refill in fingers (< 2 seconds). No clubbing, cyanosis or edema      Significant Labs: All pertinent labs within the past 24 hours have been reviewed.    Significant Imaging: I have reviewed all pertinent imaging results/findings within the past 24 hours.    Assessment/Plan:     * Sickle cell crisis  Admit for IV fluids, oxygen, IV pain control with Dilaudid, Phenergan for nausea.  Resume her home long-acting morphine and folic acid and Hydrea.  Monitor CBC      Migraine  She has chronic migraine.  Has been having migraines for the past week.  Fioricet and follow up CT head      History of pulmonary embolus (PE)  Follow up lower extremity ultrasound      VTE Risk Mitigation (From admission, onward)    None                     Janette Bautista MD  Department of Hospital Medicine  [facility]

## 2023-04-02 PROBLEM — K59.00 CONSTIPATION: Status: ACTIVE | Noted: 2023-04-02

## 2023-04-02 LAB
ANION GAP SERPL CALC-SCNC: 2 MMOL/L (ref 8–16)
BUN SERPL-MCNC: 7 MG/DL (ref 6–20)
CALCIUM SERPL-MCNC: 8.7 MG/DL (ref 8.7–10.5)
CHLORIDE SERPL-SCNC: 109 MMOL/L (ref 95–110)
CO2 SERPL-SCNC: 25 MMOL/L (ref 23–29)
CREAT SERPL-MCNC: 0.5 MG/DL (ref 0.5–1.4)
ERYTHROCYTE [DISTWIDTH] IN BLOOD BY AUTOMATED COUNT: 19.1 % (ref 11.5–14.5)
EST. GFR  (NO RACE VARIABLE): >60 ML/MIN/1.73 M^2
GLUCOSE SERPL-MCNC: 164 MG/DL (ref 70–110)
HCT VFR BLD AUTO: 22 % (ref 37–48.5)
HGB BLD-MCNC: 7.4 G/DL (ref 12–16)
MAGNESIUM SERPL-MCNC: 1.7 MG/DL (ref 1.6–2.6)
MCH RBC QN AUTO: 32.6 PG (ref 27–31)
MCHC RBC AUTO-ENTMCNC: 33.6 G/DL (ref 32–36)
MCV RBC AUTO: 97 FL (ref 82–98)
PHOSPHATE SERPL-MCNC: 3.3 MG/DL (ref 2.7–4.5)
PLATELET # BLD AUTO: 382 K/UL (ref 150–450)
PMV BLD AUTO: 10.6 FL (ref 9.2–12.9)
POTASSIUM SERPL-SCNC: 3.9 MMOL/L (ref 3.5–5.1)
RBC # BLD AUTO: 2.27 M/UL (ref 4–5.4)
SODIUM SERPL-SCNC: 136 MMOL/L (ref 136–145)
WBC # BLD AUTO: 10.95 K/UL (ref 3.9–12.7)

## 2023-04-02 PROCEDURE — 85027 COMPLETE CBC AUTOMATED: CPT | Performed by: INTERNAL MEDICINE

## 2023-04-02 PROCEDURE — 25000003 PHARM REV CODE 250: Performed by: INTERNAL MEDICINE

## 2023-04-02 PROCEDURE — 63600175 PHARM REV CODE 636 W HCPCS: Performed by: STUDENT IN AN ORGANIZED HEALTH CARE EDUCATION/TRAINING PROGRAM

## 2023-04-02 PROCEDURE — 63600175 PHARM REV CODE 636 W HCPCS: Performed by: INTERNAL MEDICINE

## 2023-04-02 PROCEDURE — 25500020 PHARM REV CODE 255: Performed by: INTERNAL MEDICINE

## 2023-04-02 PROCEDURE — 36415 COLL VENOUS BLD VENIPUNCTURE: CPT | Performed by: INTERNAL MEDICINE

## 2023-04-02 PROCEDURE — 25000003 PHARM REV CODE 250: Performed by: HOSPITALIST

## 2023-04-02 PROCEDURE — 83735 ASSAY OF MAGNESIUM: CPT | Performed by: INTERNAL MEDICINE

## 2023-04-02 PROCEDURE — 25000003 PHARM REV CODE 250: Performed by: STUDENT IN AN ORGANIZED HEALTH CARE EDUCATION/TRAINING PROGRAM

## 2023-04-02 PROCEDURE — 80048 BASIC METABOLIC PNL TOTAL CA: CPT | Performed by: INTERNAL MEDICINE

## 2023-04-02 PROCEDURE — 12000002 HC ACUTE/MED SURGE SEMI-PRIVATE ROOM

## 2023-04-02 PROCEDURE — 84100 ASSAY OF PHOSPHORUS: CPT | Performed by: INTERNAL MEDICINE

## 2023-04-02 RX ORDER — TOPIRAMATE 25 MG/1
100 TABLET ORAL 2 TIMES DAILY
Status: DISCONTINUED | OUTPATIENT
Start: 2023-04-02 | End: 2023-04-04 | Stop reason: HOSPADM

## 2023-04-02 RX ORDER — IODIXANOL 320 MG/ML
100 INJECTION, SOLUTION INTRAVASCULAR
Status: COMPLETED | OUTPATIENT
Start: 2023-04-02 | End: 2023-04-02

## 2023-04-02 RX ORDER — MAGNESIUM SULFATE HEPTAHYDRATE 40 MG/ML
2 INJECTION, SOLUTION INTRAVENOUS ONCE
Status: COMPLETED | OUTPATIENT
Start: 2023-04-02 | End: 2023-04-02

## 2023-04-02 RX ORDER — POLYETHYLENE GLYCOL 3350 17 G/17G
17 POWDER, FOR SOLUTION ORAL DAILY
Status: DISCONTINUED | OUTPATIENT
Start: 2023-04-03 | End: 2023-04-04 | Stop reason: HOSPADM

## 2023-04-02 RX ORDER — METOCLOPRAMIDE HYDROCHLORIDE 5 MG/ML
10 INJECTION INTRAMUSCULAR; INTRAVENOUS
Status: COMPLETED | OUTPATIENT
Start: 2023-04-02 | End: 2023-04-02

## 2023-04-02 RX ORDER — DIPHENHYDRAMINE HYDROCHLORIDE 50 MG/ML
25 INJECTION INTRAMUSCULAR; INTRAVENOUS EVERY 6 HOURS
Status: DISCONTINUED | OUTPATIENT
Start: 2023-04-02 | End: 2023-04-03

## 2023-04-02 RX ORDER — DEXAMETHASONE SODIUM PHOSPHATE 4 MG/ML
10 INJECTION, SOLUTION INTRA-ARTICULAR; INTRALESIONAL; INTRAMUSCULAR; INTRAVENOUS; SOFT TISSUE ONCE
Status: COMPLETED | OUTPATIENT
Start: 2023-04-02 | End: 2023-04-02

## 2023-04-02 RX ORDER — HYDROMORPHONE HYDROCHLORIDE 1 MG/ML
2 INJECTION, SOLUTION INTRAMUSCULAR; INTRAVENOUS; SUBCUTANEOUS EVERY 4 HOURS PRN
Status: DISCONTINUED | OUTPATIENT
Start: 2023-04-02 | End: 2023-04-04 | Stop reason: HOSPADM

## 2023-04-02 RX ADMIN — MAGNESIUM SULFATE HEPTAHYDRATE 2 G: 40 INJECTION, SOLUTION INTRAVENOUS at 12:04

## 2023-04-02 RX ADMIN — BUTALBITAL, ACETAMINOPHEN, AND CAFFEINE 1 TABLET: 50; 325; 40 TABLET, COATED ORAL at 07:04

## 2023-04-02 RX ADMIN — HYDROMORPHONE HYDROCHLORIDE 2 MG: 1 INJECTION, SOLUTION INTRAMUSCULAR; INTRAVENOUS; SUBCUTANEOUS at 08:04

## 2023-04-02 RX ADMIN — SENNOSIDES AND DOCUSATE SODIUM 2 TABLET: 50; 8.6 TABLET ORAL at 09:04

## 2023-04-02 RX ADMIN — TOPIRAMATE 50 MG: 25 TABLET, FILM COATED ORAL at 08:04

## 2023-04-02 RX ADMIN — DEXAMETHASONE SODIUM PHOSPHATE 10 MG: 4 INJECTION, SOLUTION INTRA-ARTICULAR; INTRALESIONAL; INTRAMUSCULAR; INTRAVENOUS; SOFT TISSUE at 12:04

## 2023-04-02 RX ADMIN — FOLIC ACID 1 MG: 1 TABLET ORAL at 08:04

## 2023-04-02 RX ADMIN — HYDROMORPHONE HYDROCHLORIDE 2 MG: 1 INJECTION, SOLUTION INTRAMUSCULAR; INTRAVENOUS; SUBCUTANEOUS at 12:04

## 2023-04-02 RX ADMIN — IODIXANOL 100 ML: 320 INJECTION, SOLUTION INTRAVASCULAR at 10:04

## 2023-04-02 RX ADMIN — METOCLOPRAMIDE 10 MG: 5 INJECTION, SOLUTION INTRAMUSCULAR; INTRAVENOUS at 02:04

## 2023-04-02 RX ADMIN — BUTALBITAL, ACETAMINOPHEN, AND CAFFEINE 1 TABLET: 50; 325; 40 TABLET, COATED ORAL at 11:04

## 2023-04-02 RX ADMIN — DEXTROSE 500 MG: 50 INJECTION, SOLUTION INTRAVENOUS at 03:04

## 2023-04-02 RX ADMIN — TOPIRAMATE 100 MG: 25 TABLET, FILM COATED ORAL at 09:04

## 2023-04-02 RX ADMIN — MORPHINE SULFATE 30 MG: 30 TABLET, EXTENDED RELEASE ORAL at 09:04

## 2023-04-02 RX ADMIN — METOCLOPRAMIDE 10 MG: 5 INJECTION, SOLUTION INTRAMUSCULAR; INTRAVENOUS at 08:04

## 2023-04-02 RX ADMIN — HYDROXYUREA 1000 MG: 500 CAPSULE ORAL at 09:04

## 2023-04-02 RX ADMIN — DIPHENHYDRAMINE HYDROCHLORIDE 25 MG: 50 INJECTION, SOLUTION INTRAMUSCULAR; INTRAVENOUS at 08:04

## 2023-04-02 RX ADMIN — OXYCODONE HYDROCHLORIDE 15 MG: 5 TABLET ORAL at 03:04

## 2023-04-02 RX ADMIN — DEXTROSE 500 MG: 50 INJECTION, SOLUTION INTRAVENOUS at 11:04

## 2023-04-02 RX ADMIN — OXYCODONE HYDROCHLORIDE 15 MG: 5 TABLET ORAL at 07:04

## 2023-04-02 RX ADMIN — DIPHENHYDRAMINE HYDROCHLORIDE 25 MG: 50 INJECTION, SOLUTION INTRAMUSCULAR; INTRAVENOUS at 02:04

## 2023-04-02 RX ADMIN — HYDROMORPHONE HYDROCHLORIDE 2 MG: 1 INJECTION, SOLUTION INTRAMUSCULAR; INTRAVENOUS; SUBCUTANEOUS at 04:04

## 2023-04-02 RX ADMIN — ENOXAPARIN SODIUM 40 MG: 100 INJECTION SUBCUTANEOUS at 04:04

## 2023-04-02 RX ADMIN — DIPHENHYDRAMINE HYDROCHLORIDE 25 MG: 50 INJECTION, SOLUTION INTRAMUSCULAR; INTRAVENOUS at 11:04

## 2023-04-02 RX ADMIN — ZOLPIDEM TARTRATE 10 MG: 5 TABLET, COATED ORAL at 11:04

## 2023-04-02 RX ADMIN — HYDROMORPHONE HYDROCHLORIDE 1 MG: 1 INJECTION, SOLUTION INTRAMUSCULAR; INTRAVENOUS; SUBCUTANEOUS at 03:04

## 2023-04-02 RX ADMIN — SENNOSIDES AND DOCUSATE SODIUM 2 TABLET: 50; 8.6 TABLET ORAL at 08:04

## 2023-04-02 RX ADMIN — BUTALBITAL, ACETAMINOPHEN, AND CAFFEINE 1 TABLET: 50; 325; 40 TABLET, COATED ORAL at 03:04

## 2023-04-02 RX ADMIN — METOCLOPRAMIDE 10 MG: 5 INJECTION, SOLUTION INTRAMUSCULAR; INTRAVENOUS at 04:04

## 2023-04-02 RX ADMIN — ACETAMINOPHEN 650 MG: 325 TABLET ORAL at 11:04

## 2023-04-02 NOTE — SUBJECTIVE & OBJECTIVE
Interval History:  Headache persists today.  No interval change.  Nausea but no emesis, was able to tolerate 100% of lunch.  No bowel movement since admission but she states this is typical once admitted to hospital.  She has been trying to increase her activity, states she took a shower, ambulating in room, ambulated to vending machine.  Afebrile with T-max 98.9°.  Labs with hemoglobin 7.4.  MRI and CT reviewed.  Discussed with patient.  No visitors at bedside.    Review of Systems   Constitutional:  Negative for fever.   Gastrointestinal:  Positive for nausea.   Neurological:  Positive for headaches.   Psychiatric/Behavioral:  Negative for confusion.    Objective:     Vital Signs (Most Recent):  Temp: 98 °F (36.7 °C) (04/02/23 1226)  Pulse: 90 (04/02/23 1226)  Resp: 18 (04/02/23 1226)  BP: 126/71 (04/02/23 1226)  SpO2: 100 % (04/02/23 1226) Vital Signs (24h Range):  Temp:  [97.9 °F (36.6 °C)-98.9 °F (37.2 °C)] 98 °F (36.7 °C)  Pulse:  [] 90  Resp:  [16-18] 18  SpO2:  [97 %-100 %] 100 %  BP: (104-126)/(54-71) 126/71     Weight: 87.8 kg (193 lb 9 oz)  Body mass index is 35.4 kg/m².    Intake/Output Summary (Last 24 hours) at 4/2/2023 1315  Last data filed at 4/1/2023 1819  Gross per 24 hour   Intake 2248.75 ml   Output --   Net 2248.75 ml      Physical Exam  Vitals and nursing note reviewed.   Constitutional:       Comments: Sitting in bed   HENT:      Head: Normocephalic and atraumatic.      Mouth/Throat:      Mouth: Mucous membranes are moist.   Eyes:      Conjunctiva/sclera: Conjunctivae normal.   Cardiovascular:      Rate and Rhythm: Normal rate and regular rhythm.   Pulmonary:      Effort: No respiratory distress.      Breath sounds: No stridor. No wheezing.      Comments: On NC  Abdominal:      Palpations: Abdomen is soft.      Tenderness: There is no abdominal tenderness.   Musculoskeletal:      Cervical back: Neck supple.   Skin:     General: Skin is warm.   Neurological:      Mental Status: She is  alert and oriented to person, place, and time.   Psychiatric:         Mood and Affect: Mood normal.       Significant Labs: BMP:   Recent Labs   Lab 04/02/23  0551   *      K 3.9      CO2 25   BUN 7   CREATININE 0.5   CALCIUM 8.7   MG 1.7     CBC:   Recent Labs   Lab 04/01/23  0907 04/02/23  0551   WBC 11.92 10.95   HGB 7.8* 7.4*   HCT 23.8* 22.0*    382     CMP:   Recent Labs   Lab 04/01/23  0907 04/02/23  0551    136   K 3.8 3.9    109   CO2 27 25   * 164*   BUN 7 7   CREATININE 0.6 0.5   CALCIUM 8.7 8.7   PROT 8.1  --    ALBUMIN 4.0  --    BILITOT 1.6*  --    ALKPHOS 74  --    AST 30  --    ALT 19  --    ANIONGAP 8 2*     Magnesium:   Recent Labs   Lab 04/01/23 0907 04/02/23  0551   MG 1.8 1.7       Significant Imaging: I have reviewed all pertinent imaging results/findings within the past 24 hours.    X-Ray Abdomen AP 1 View    Result Date: 4/2/2023  KUB Clinical history is abdominal pain There is a mild amount of fecal material in the right colon suggestive of constipation. There are no dilated bowel loops. No organomegaly or abnormal soft tissue mass. There are no osseous or normalities. IMPRESSION: Mild amount of fecal material in the right colon suggestive of constipation No evidence of obstruction Electronically signed by:  Coby Hernandez MD  4/2/2023 9:52 AM CDT Workstation: [id number]    CT Head Without Contrast    Result Date: 4/1/2023  Exam: Unenhanced CT scan of brain INDICATION: Headache TECHNIQUE: Axial images in 5 mm intervals were obtained through the brain without the administration of intravenous contrast material. CMS MANDATED QUALITY DATA - CT RADIATION  436 All CT scans at this facility utilize dose modulation, iterative reconstruction, and/or weight based dosing when appropriate to reduce radiation dose to as low as reasonably achievable. FINDINGS: The gray-white matter demonstrates an unremarkable appearance. There is no evidence of an  intracranial hemorrhage. There is no evidence of an intracranial mass. Ventricular system is normal. Basal cisterns are maintained. Posterior fossa is unremarkable. Orbits are symmetric. Calvarium demonstrates an unremarkable appearance. IMPRESSION: Normal exam. Electronically signed by:  Tomer Willams MD  4/1/2023 6:12 AM CDT Workstation: [id number]    MRI Brain Without Contrast    Result Date: 4/2/2023  MRI of the brain Clinical history is headache Multiplanar images of the brain were obtained. Comparison is made to a prior head CT dated 4/1/2023 FINDINGS: The ventricles and sulci are normal in size and configuration for age. There is no hemorrhage, mass or midline shift. There are no extra-axial fluid collections. There is normal signal within the white matter on all sequences. There is no abnormality on the diffusion-weighted images to suggest acute infarct. There are flow voids within the cavernous portions of the internal carotid arteries and basilar artery indicating patency. The corpus callosum, cerebellar tonsils, midline structures and orbits are normal. The paranasal sinuses and mastoid air cells are clear. IMPRESSION: Negative MRI of the brain Electronically signed by:  Coby Hernandez MD  4/2/2023 11:08 AM CDT Workstation: [id number]    X-Ray Chest AP Portable    Result Date: 4/1/2023  CLINICAL HISTORY: 47 years (1975) Female chest pain Chest pain TECHNIQUE: Portable AP radiograph the chest. COMPARISON: Radiograph November 24, 2022. FINDINGS: Mildly increased central hilar interstitial opacities are seen bilaterally suggestive of either mild edema, atypical infection/pneumonia or bronchitis in the appropriate clinical setting. There is blunting of both costophrenic angles consistent with trace pleural effusions and adjacent atelectasis. No pneumothorax is identified. The heart is mildly enlarged.  Osseous structures show degenerative changes in the spine. The visualized upper abdomen is  unremarkable. IMPRESSION: Findings compatible with mild pulmonary vascular congestion/interstitial pulmonary edema as above. . Electronically signed by:  Marin Mckeon MD  4/1/2023 8:40 AM CDT Workstation: [id number]    US Lower Extremity Veins Right    Result Date: 4/1/2023  Exam: Right lower extremity venous Doppler INDICATION: Right lower extremity pain FINDINGS: On grayscale imaging there is compressibility of the common femoral, femoral, and popliteal veins. Color-flow is noted in all venous segments including the tibial veins. Waveforms are normal. IMPRESSION: No evidence of deep vein thrombosis involving the right lower extremity. Electronically signed by:  Tomer Willams MD  4/1/2023 7:13 AM CDT Workstation: [id number]    CTA Head and Neck (xpd)    Result Date: 4/2/2023  EXAMINATION: CTA HEAD AND NECK (XPD) CLINICAL INDICATION: Female, 47 years old. Dizziness, persistent/recurrent, cardiac or vascular cause suspected TECHNIQUE: Axial CT angiogram of the head and neck was performed with contrast. Multiplanar reformations as well as 3-D volume rendered imaging were reviewed at the workstation. Degree of stenosis was measured utilizing the NASCET method. CONTRAST: 100 mL mL of Omnipaque 350 IV. COMPARISON: Head CT dated 4/1/2023 FINDINGS: CT of the brain: The distal vertebral arteries, basilar artery and cavernous portions of the internal carotid arteries are patent. The anterior cerebral arteries, middle cerebral arteries and posterior cerebral arteries are patent without large vessel occlusion, stenosis, aneurysm or AVM Aortic Arch and Great Vessel Origins: Widely patent. Subclavian Arteries: Widely patent. Right Common Carotid Artery: Widely patent. Right Internal Carotid Artery: Widely patent. Right External Carotid Artery: Widely patent. Left Common Carotid Artery: Widely patent. Left Internal Carotid Artery: Widely patent. Left External Carotid Artery: Widely patent. Right Vertebral Artery: Widely  patent. Left Vertebral Artery: Widely patent. The paraspinous soft tissues are normal. There are no acute osseous abnormalities. The lung apices are clear. IMPRESSION: Negative CTA of the head and neck. This exam was performed according to our departmental dose-optimization program which includes automated exposure control, adjustment of the mA and/or kV according to patient size and/or use of iterative reconstruction technique. Electronically signed by:  Coby Hernandez MD  4/2/2023 11:13 AM CDT Workstation: [id number]

## 2023-04-02 NOTE — CONSULTS
[facility]  Neurology Consult    Patient Name: Sunita Mendez  MRN: [id number]  : 1975  TODAY'S DATE: 2023  ADMIT DATE: 2023  5:52 AM                                          CONSULTED PROVIDER: Cande Rausch MD, Neurologist. On-call Phone: [phone]  CONSULT REQUESTED BY: Deja Fam MD     Chief Complaint   Patient presents with    Chest Pain       HPI per EMR:  47-year-old woman with sickle cell anemia presenting with pain.  Patient reports she has had sickle cell pain of her whole body for the past 3 weeks progressively worsening despite her home oral pain medication.  She has had severe migraines for the past 1 week consisting of nausea and photosensitivity.  She complains of pain in multiple areas including a twisting pain in her abdomen and lower extremity pain bilaterally.  She is been able to eat without issue, has bowel movements daily, and denies fever, chills, dysuria, cough, sick contacts, difficulty breathing, wheezing, chest pain.  In the ED, she still complains of pain despite IV medications.        Overview/Hospital Course:  Patient with a history of migraine headache, on topiramate, states she has a neurology referral at [facility].  Also known history of sickle cell disease, followed by hematologist Dr. Michaels, she is on Hydrea, intermittently taking Endari, on chronic opioid medication, review of  on 23 morphine 30 mg, 30 day supply, 60 tablets, oxycodone 15 mg, 30 day supply, 180 tablets, 23 alprazolam 1 mg, 30 day supply, 30 tablets.  She states she began to feel well over the last 3 weeks, states her PCP checked her blood count and recommended possible transfusion but she could not arrange childcare at home, her mother was assisting her, she was staying mostly in bed.  Over the last few days migraine returned, not improved which prompted ED presentation.  Reports 1 year history of intermittent twisting abdominal pain, having regular bowel  movements.  Afebrile, labs with hemoglobin 7.6, T bilirubin 1.6, EKG sinus rhythm, ultrasound no DVT, chest x-ray with concern for vascular prominence, CT head no acute, she was admitted with acute pain crisis.  On 04/01, still with headache, ordered migraine medication including scheduled Benadryl, Reglan, continue IV fluid, Neurology evaluation.    Neurology Consult:  Patient was seen examined by me today.  She is a 47-year-old woman with history of sickle cell anemia presenting with whole-body pain as well as worsening migraines for the past week.  She states that she suffers from migraines since she was 14 years old, and years ago was put on topiramate which seemed to help the beginning but is no longer helping with the prevention.  She states that this migraine attack has lasted at least 2 or 3 days, and nothing has helped the pain.  Usually Dilaudid works, but she states that it has not helped this time.  She also says that this headache has been more intense than prior events, is throbbing, associated to nausea, vomiting as well as light and sound sensitivity.  She has received Benadryl, Reglan, IV fluids with no improvement of the migraines.  Neurology consulted for assistance.  Patient denies focal weakness, numbness, tingling, vision loss or slurred speech.    Review of Systems:    A comprehensive ROS was performed and all systems were negative except as noted above in HPI.    Past Medical History:  has a past medical history of Anticoagulant long-term use.    Past Surgical History:  has no past surgical history on file.    Family Medical History: family history is not on file.    Social History:  reports that she has never smoked. She has never used smokeless tobacco. She reports that she does not currently use alcohol. She reports that she does not currently use drugs.    PCP: Primary Doctor No    Allergies: Review of patient's allergies indicates:  No Known Allergies    Medications:   No current  facility-administered medications on file prior to encounter.     Current Outpatient Medications on File Prior to Encounter   Medication Sig Dispense Refill    cyclobenzaprine (FLEXERIL) 10 MG tablet Take 1 tablet by mouth 2 (two) times daily as needed.      folic acid (FOLVITE) 1 MG tablet Take 1 tablet (1 mg total) by mouth once daily. 30 tablet 0    hydroxyurea (HYDREA) 500 mg Cap Take 1,000 mg by mouth once daily.      multivitamin (ONE DAILY MULTIVITAMIN) per tablet Take 1 tablet by mouth once daily.      oxyCODONE (ROXICODONE) 15 MG Tab Take 15 mg by mouth every 4 (four) hours as needed.      topiramate (TOPAMAX) 50 MG tablet Take 50 mg by mouth 2 (two) times daily.      ALPRAZolam (XANAX) 1 MG tablet Take 1 mg by mouth nightly as needed.      morphine (MS CONTIN) 30 MG 12 hr tablet Take 30 mg by mouth 2 (two) times daily as needed.       Scheduled Meds:   dexAMETHasone  10 mg Intravenous Once    diphenhydrAMINE  25 mg Intravenous Q6H    enoxparin  40 mg Subcutaneous Daily    folic acid  1 mg Oral Daily    hydroxyurea  1,000 mg Oral Daily    magnesium sulfate IVPB  2 g Intravenous Once    metoclopramide HCl  10 mg Intravenous Q6H    morphine  30 mg Oral Q12H    senna-docusate 8.6-50 mg  2 tablet Oral BID    topiramate  100 mg Oral BID    valproate sodium (DEPACON) IVPB  500 mg Intravenous Q8H    zolpidem  10 mg Oral QHS     Continuous Infusions:   sodium chloride 0.9% 75 mL/hr at 04/01/23 1610     PRN Meds:.acetaminophen, butalbital-acetaminophen-caffeine -40 mg, diphenhydrAMINE, HYDROmorphone, melatonin, ondansetron, oxyCODONE, promethazine (PHENERGAN) IVPB, sodium chloride 0.9%      Physical Exam  Current Vitals:  Vitals:    04/02/23 0825   BP:    Pulse:    Resp: 16   Temp:      Physical Exam:  General: AO x4  HEENT: PERRL, EOMI  CV: RRR  Lungs: no respiratory distress  Abdomen: soft, non tender    Neurological Exam    MENTAL STATUS EXAM:  Level of alertness: Alert  Level of attention: Attentive w/out  deficit  Orientation/Awareness: intact to person, place, time, situation  Language: fluent. Comprehension/repetition/naming intact    CRANIAL NERVE EXAM:  II/III: fundoscopic exam deferred, PERRL; visual fields intact  III/IV/VI: EOMI   V: no deficits appreciated to light touch  VII: no facial asymmetry noted  VIII: no deficits in hearing bilaterally  IX/X: palate @ ML and raises symmetrically  XI: shoulder shrug 5/5 bilaterally  XII: tongue to midline w/out asymmetry  No dysarthria noted on exam.    MOTOR EXAM:  Bulk and Tone: normal throughout  Strength is 5/5 proximally and distally in upper and lower extremities without deficits.  No pronator drift in bilateral UE.    REFLEXES:  1+ in bilateral upper and lower extremities.    SENSORY EXAM  Light touch intact throughout in upper and lower extremities without deficits.    COORDINATION/CEREBELLAR EXAM:  FTN: no dysmetria or other signs of appendicular ataxia  HTS: unable to assess as patient is in severe pain    GAIT:  Deferred for safety.    Laboratory Data & Studies    Recent Labs   Lab 04/01/23  0907 04/02/23  0551   WBC 11.92 10.95   HGB 7.8* 7.4*    382   MCV 97 97       Recent Labs   Lab 04/01/23  0907 04/02/23  0551    136   K 3.8 3.9    109   CO2 27 25   BUN 7 7   * 164*   CALCIUM 8.7 8.7   MG 1.8 1.7   PHOS  --  3.3       Recent Labs   Lab 04/01/23  0907   PROT 8.1   ALBUMIN 4.0   BILITOT 1.6*   AST 30   ALT 19   ALKPHOS 74       No results for input(s): LABPT, INR, APTT in the last 168 hours.    No results for input(s): HGBA1C, CHOL, TRIG, LDLCALC, HDL, TSH in the last 168 hours.      Microbiology:  Microbiology Results (last 7 days)       ** No results found for the last 168 hours. **              Imaging:  CT Head Without Contrast    Result Date: 4/1/2023  Exam: Unenhanced CT scan of brain INDICATION: Headache TECHNIQUE: Axial images in 5 mm intervals were obtained through the brain without the administration of intravenous  contrast material. CMS MANDATED QUALITY DATA - CT RADIATION  436 All CT scans at this facility utilize dose modulation, iterative reconstruction, and/or weight based dosing when appropriate to reduce radiation dose to as low as reasonably achievable. FINDINGS: The gray-white matter demonstrates an unremarkable appearance. There is no evidence of an intracranial hemorrhage. There is no evidence of an intracranial mass. Ventricular system is normal. Basal cisterns are maintained. Posterior fossa is unremarkable. Orbits are symmetric. Calvarium demonstrates an unremarkable appearance. IMPRESSION: Normal exam. Electronically signed by:  Tomer Willams MD  4/1/2023 6:12 AM CDT Workstation: [id number]    X-Ray Chest AP Portable    Result Date: 4/1/2023  CLINICAL HISTORY: 47 years (1975) Female chest pain Chest pain TECHNIQUE: Portable AP radiograph the chest. COMPARISON: Radiograph November 24, 2022. FINDINGS: Mildly increased central hilar interstitial opacities are seen bilaterally suggestive of either mild edema, atypical infection/pneumonia or bronchitis in the appropriate clinical setting. There is blunting of both costophrenic angles consistent with trace pleural effusions and adjacent atelectasis. No pneumothorax is identified. The heart is mildly enlarged.  Osseous structures show degenerative changes in the spine. The visualized upper abdomen is unremarkable. IMPRESSION: Findings compatible with mild pulmonary vascular congestion/interstitial pulmonary edema as above. . Electronically signed by:  Marin Mckeon MD  4/1/2023 8:40 AM CDT Workstation: [id number]    US Lower Extremity Veins Right    Result Date: 4/1/2023  Exam: Right lower extremity venous Doppler INDICATION: Right lower extremity pain FINDINGS: On grayscale imaging there is compressibility of the common femoral, femoral, and popliteal veins. Color-flow is noted in all venous segments including the tibial veins. Waveforms are normal.  IMPRESSION: No evidence of deep vein thrombosis involving the right lower extremity. Electronically signed by:  Tomer Willams MD  4/1/2023 7:13 AM CDT Workstation: [id number]        Assessment and Plan:    Status Migrainosus   History of Sickle Cell Disease on crisis  47-year-old woman with history of sickle cell anemia presenting with whole-body pain as well as worsening migraines for the past week.   She states that this migraine attack has lasted at least 2 or 3 days, and nothing has helped the pain. She has received Benadryl, Reglan, IV fluids with no improvement of the migraines.  Neurology consulted for assistance. Neuro exam is non focal.  - Admitted to hospital medicine with q4 hour neuro checks, on telemetry, continuous pulse oximetry  - Ordered Depacon 500 mg IV TID for a total of 3 doses as well as dexamethasone 10 mg IV once and magnesium sulfate 2 g IV once for abortive treatment of status migrainosus  - increase topiramate to 100 mg b.i.d. for prevention of migraines  - increased normal saline infusion to 100 mL/hr  - Ordered MRI brain without contrast which showed no evidence of stroke or other abnormality, no T2 hyperintensities on FLAIR and no hemorrhage  - CTA head and neck ordered to rule out carotid stenosis which is common in sickle cell patients, but resulted normal with no evidence of stenosis, occlusion or aneurysms  - patient will need follow-up as outpatient with Neurology for management of migraine headaches  - Maintain Euthermia with Tylenol prn temp > 37.2 degrees C.  - Assessment for rehab with PT/OT/SLP evaluation and treatment.  - workup and treatment of metabolic and infectious abnormalities as per primary team  - Will follow along      DVT prophylaxis with chemo/SCD prophylaxis      Patient to follow up with [facility] at [phone] within 3 days from discharge.     Stroke education was provided including stroke risk factors modification and any acute neurological  changes including weakness, confusion, visual changes to come straight to the ER.     All questions were answered.                              Thank you kindly for including us in the care of this patient. Please do not hesitate to contact us with any questions.       62 minutes of care time has been spent evaluating with the patient. Time includes chart review not limited to diagnostic imaging, labs, and vitals, patient assessment, discussion with family and nursing, current order evaluations, and new order entries.      Cande Rausch MD  Neurology

## 2023-04-02 NOTE — PROGRESS NOTES
[facility]  Progress Note    Patient Name: Sunita Mendez  MRN: [id number]  Patient Class: IP- Inpatient   Admission Date: 4/1/2023  Length of Stay: 1 days  Attending Physician: Deja Fam MD  Primary Care Provider: Primary Doctor No        Subjective:     Principal Problem:Migraine        HPI:  47-year-old woman with sickle cell anemia presenting with pain.  Patient reports she has had sickle cell pain of her whole body for the past 3 weeks progressively worsening despite her home oral pain medication.  She has had severe migraines for the past 1 week consisting of nausea and photosensitivity.  She complains of pain in multiple areas including a twisting pain in her abdomen and lower extremity pain bilaterally.  She is been able to eat without issue, has bowel movements daily, and denies fever, chills, dysuria, cough, sick contacts, difficulty breathing, wheezing, chest pain.  In the ED, she still complains of pain despite IV medications.      Overview/Hospital Course:  Patient with a history of migraine headache, on topiramate, states she has a neurology referral at [facility].  Also known history of sickle cell disease, followed by hematologist Dr. Michaels, she is on Hydrea, intermittently taking Endari, on chronic opioid medication, review of  on 2/2/23 morphine 30 mg, 30 day supply, 60 tablets, oxycodone 15 mg, 30 day supply, 180 tablets, 2/23/23 alprazolam 1 mg, 30 day supply, 30 tablets.  She states she began to feel well over the last 3 weeks, states her PCP checked her blood count and recommended possible transfusion but she could not arrange childcare at home, her mother was assisting her, she was staying mostly in bed.  Over the last few days migraine returned, not improved which prompted ED presentation.  Reports 1 year history of intermittent twisting abdominal pain, having regular bowel movements.  Afebrile, labs with hemoglobin 7.6, T bilirubin 1.6, EKG sinus rhythm,  ultrasound no DVT, chest x-ray with concern for vascular prominence, CT head no acute, she was admitted with acute pain crisis.  On 04/01, still with headache, ordered migraine medication including scheduled Benadryl, Reglan, continue IV fluid, Neurology evaluation.  On 04/02 headache still present, nausea with no emesis, MRI brain no acute, CTA negative, Neurology doing 3 doses of Depacon, dose of dexamethasone, 2 g IV magnesium, monitoring.      Interval History:  Headache persists today.  No interval change.  Nausea but no emesis, was able to tolerate 100% of lunch.  No bowel movement since admission but she states this is typical once admitted to hospital.  She has been trying to increase her activity, states she took a shower, ambulating in room, ambulated to vending machine.  Afebrile with T-max 98.9°.  Labs with hemoglobin 7.4.  MRI and CT reviewed.  Discussed with patient.  No visitors at bedside.    Review of Systems   Constitutional:  Negative for fever.   Gastrointestinal:  Positive for nausea.   Neurological:  Positive for headaches.   Psychiatric/Behavioral:  Negative for confusion.    Objective:     Vital Signs (Most Recent):  Temp: 98 °F (36.7 °C) (04/02/23 1226)  Pulse: 90 (04/02/23 1226)  Resp: 18 (04/02/23 1226)  BP: 126/71 (04/02/23 1226)  SpO2: 100 % (04/02/23 1226) Vital Signs (24h Range):  Temp:  [97.9 °F (36.6 °C)-98.9 °F (37.2 °C)] 98 °F (36.7 °C)  Pulse:  [] 90  Resp:  [16-18] 18  SpO2:  [97 %-100 %] 100 %  BP: (104-126)/(54-71) 126/71     Weight: 87.8 kg (193 lb 9 oz)  Body mass index is 35.4 kg/m².    Intake/Output Summary (Last 24 hours) at 4/2/2023 1315  Last data filed at 4/1/2023 1819  Gross per 24 hour   Intake 2248.75 ml   Output --   Net 2248.75 ml      Physical Exam  Vitals and nursing note reviewed.   Constitutional:       Comments: Sitting in bed   HENT:      Head: Normocephalic and atraumatic.      Mouth/Throat:      Mouth: Mucous membranes are moist.   Eyes:       Conjunctiva/sclera: Conjunctivae normal.   Cardiovascular:      Rate and Rhythm: Normal rate and regular rhythm.   Pulmonary:      Effort: No respiratory distress.      Breath sounds: No stridor. No wheezing.      Comments: On NC  Abdominal:      Palpations: Abdomen is soft.      Tenderness: There is no abdominal tenderness.   Musculoskeletal:      Cervical back: Neck supple.   Skin:     General: Skin is warm.   Neurological:      Mental Status: She is alert and oriented to person, place, and time.   Psychiatric:         Mood and Affect: Mood normal.       Significant Labs: BMP:   Recent Labs   Lab 04/02/23  0551   *      K 3.9      CO2 25   BUN 7   CREATININE 0.5   CALCIUM 8.7   MG 1.7     CBC:   Recent Labs   Lab 04/01/23  0907 04/02/23  0551   WBC 11.92 10.95   HGB 7.8* 7.4*   HCT 23.8* 22.0*    382     CMP:   Recent Labs   Lab 04/01/23  0907 04/02/23  0551    136   K 3.8 3.9    109   CO2 27 25   * 164*   BUN 7 7   CREATININE 0.6 0.5   CALCIUM 8.7 8.7   PROT 8.1  --    ALBUMIN 4.0  --    BILITOT 1.6*  --    ALKPHOS 74  --    AST 30  --    ALT 19  --    ANIONGAP 8 2*     Magnesium:   Recent Labs   Lab 04/01/23  0907 04/02/23  0551   MG 1.8 1.7       Significant Imaging: I have reviewed all pertinent imaging results/findings within the past 24 hours.    X-Ray Abdomen AP 1 View    Result Date: 4/2/2023  KUB Clinical history is abdominal pain There is a mild amount of fecal material in the right colon suggestive of constipation. There are no dilated bowel loops. No organomegaly or abnormal soft tissue mass. There are no osseous or normalities. IMPRESSION: Mild amount of fecal material in the right colon suggestive of constipation No evidence of obstruction Electronically signed by:  Coby Hernandez MD  4/2/2023 9:52 AM CDT Workstation: [id number]    CT Head Without Contrast    Result Date: 4/1/2023  Exam: Unenhanced CT scan of brain INDICATION: Headache TECHNIQUE: Axial  images in 5 mm intervals were obtained through the brain without the administration of intravenous contrast material. CMS MANDATED QUALITY DATA - CT RADIATION  436 All CT scans at this facility utilize dose modulation, iterative reconstruction, and/or weight based dosing when appropriate to reduce radiation dose to as low as reasonably achievable. FINDINGS: The gray-white matter demonstrates an unremarkable appearance. There is no evidence of an intracranial hemorrhage. There is no evidence of an intracranial mass. Ventricular system is normal. Basal cisterns are maintained. Posterior fossa is unremarkable. Orbits are symmetric. Calvarium demonstrates an unremarkable appearance. IMPRESSION: Normal exam. Electronically signed by:  Tomer Willams MD  4/1/2023 6:12 AM CDT Workstation: [id number]    MRI Brain Without Contrast    Result Date: 4/2/2023  MRI of the brain Clinical history is headache Multiplanar images of the brain were obtained. Comparison is made to a prior head CT dated 4/1/2023 FINDINGS: The ventricles and sulci are normal in size and configuration for age. There is no hemorrhage, mass or midline shift. There are no extra-axial fluid collections. There is normal signal within the white matter on all sequences. There is no abnormality on the diffusion-weighted images to suggest acute infarct. There are flow voids within the cavernous portions of the internal carotid arteries and basilar artery indicating patency. The corpus callosum, cerebellar tonsils, midline structures and orbits are normal. The paranasal sinuses and mastoid air cells are clear. IMPRESSION: Negative MRI of the brain Electronically signed by:  Coby Hernandez MD  4/2/2023 11:08 AM CDT Workstation: [id number]    X-Ray Chest AP Portable    Result Date: 4/1/2023  CLINICAL HISTORY: 47 years (1975) Female chest pain Chest pain TECHNIQUE: Portable AP radiograph the chest. COMPARISON: Radiograph November 24, 2022. FINDINGS: Mildly  increased central hilar interstitial opacities are seen bilaterally suggestive of either mild edema, atypical infection/pneumonia or bronchitis in the appropriate clinical setting. There is blunting of both costophrenic angles consistent with trace pleural effusions and adjacent atelectasis. No pneumothorax is identified. The heart is mildly enlarged.  Osseous structures show degenerative changes in the spine. The visualized upper abdomen is unremarkable. IMPRESSION: Findings compatible with mild pulmonary vascular congestion/interstitial pulmonary edema as above. . Electronically signed by:  Marin Mckeon MD  4/1/2023 8:40 AM CDT Workstation: [id number]    US Lower Extremity Veins Right    Result Date: 4/1/2023  Exam: Right lower extremity venous Doppler INDICATION: Right lower extremity pain FINDINGS: On grayscale imaging there is compressibility of the common femoral, femoral, and popliteal veins. Color-flow is noted in all venous segments including the tibial veins. Waveforms are normal. IMPRESSION: No evidence of deep vein thrombosis involving the right lower extremity. Electronically signed by:  Tomer Willams MD  4/1/2023 7:13 AM CDT Workstation: [id number]    CTA Head and Neck (xpd)    Result Date: 4/2/2023  EXAMINATION: CTA HEAD AND NECK (XPD) CLINICAL INDICATION: Female, 47 years old. Dizziness, persistent/recurrent, cardiac or vascular cause suspected TECHNIQUE: Axial CT angiogram of the head and neck was performed with contrast. Multiplanar reformations as well as 3-D volume rendered imaging were reviewed at the workstation. Degree of stenosis was measured utilizing the NASCET method. CONTRAST: 100 mL mL of Omnipaque 350 IV. COMPARISON: Head CT dated 4/1/2023 FINDINGS: CT of the brain: The distal vertebral arteries, basilar artery and cavernous portions of the internal carotid arteries are patent. The anterior cerebral arteries, middle cerebral arteries and posterior cerebral arteries are patent  without large vessel occlusion, stenosis, aneurysm or AVM Aortic Arch and Great Vessel Origins: Widely patent. Subclavian Arteries: Widely patent. Right Common Carotid Artery: Widely patent. Right Internal Carotid Artery: Widely patent. Right External Carotid Artery: Widely patent. Left Common Carotid Artery: Widely patent. Left Internal Carotid Artery: Widely patent. Left External Carotid Artery: Widely patent. Right Vertebral Artery: Widely patent. Left Vertebral Artery: Widely patent. The paraspinous soft tissues are normal. There are no acute osseous abnormalities. The lung apices are clear. IMPRESSION: Negative CTA of the head and neck. This exam was performed according to our departmental dose-optimization program which includes automated exposure control, adjustment of the mA and/or kV according to patient size and/or use of iterative reconstruction technique. 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  Will start on Hydrea today 500 mg each morning with repeat CBC and CMP in 2 weeks and recheck in 4 weeks.       Plan:  Continue care on medical floor   Continue IV fluids at 100 cc/hour   Continue supplemental oxygen via nasal cannula   Depacon 500 mg x 3 doses, dexamethasone 10 mg, 2 g IV magnesium replacement, following headaches   Continue p.r.n. pain control as ordered  Adjust stool softeners and following bowel movement   A.m. labs ordered  Continue to increase activity, ambulate   Appreciate Neurology input   Further plan as per hospital course    VTE Risk Mitigation  (From admission, onward)         Ordered     enoxaparin injection 40 mg  Daily         04/01/23 1647     IP VTE HIGH RISK PATIENT  Once         04/01/23 0807     Place sequential compression device  Until discontinued         04/01/23 0807                Discharge Planning   CORRINE:      Code Status: Full Code   Is the patient medically ready for discharge?:     Reason for patient still in hospital (select all that apply): Patient trending condition  Discharge Plan A: Home with family                  Deja Fam MD  Department of Hospital Medicine   [facility]

## 2023-04-02 NOTE — PROGRESS NOTES
MRI of the brain was done. Patient came in with severe migraine headache. Pt states that her headache was off and on over past few weeks.

## 2023-04-02 NOTE — ED PROVIDER NOTES
Encounter Date: 4/1/2023     See paper chart  History     Chief Complaint   Patient presents with    Chest Pain     47-year-old female presented emergency department with pain in the chest diffusely consistent with sickle cell pain crisis.  Denies fever chills or nausea vomiting or dysuria or hematuria or any focal weakness or numbness    Review of patient's allergies indicates:  No Known Allergies  Past Medical History:   Diagnosis Date    Anticoagulant long-term use      No past surgical history on file.  No family history on file.  Social History     Tobacco Use    Smoking status: Never    Smokeless tobacco: Never   Substance Use Topics    Alcohol use: Not Currently    Drug use: Not Currently     Review of Systems   Constitutional: Negative.    HENT: Negative.     Eyes: Negative.    Respiratory: Negative.     Cardiovascular:  Positive for chest pain.   Gastrointestinal: Negative.    Endocrine: Negative.    Genitourinary: Negative.    Musculoskeletal: Negative.    Skin: Negative.    Allergic/Immunologic: Negative.    Neurological: Negative.    Hematological: Negative.    Psychiatric/Behavioral: Negative.     All other systems reviewed and are negative.    Physical Exam     Initial Vitals   BP Pulse Resp Temp SpO2   04/01/23 0630 04/01/23 0622 04/01/23 0622 04/01/23 0653 04/01/23 0622   115/83 98 19 98.7 °F (37.1 °C) 100 %      MAP       --                Physical Exam    Nursing note and vitals reviewed.  Constitutional: She appears well-developed and well-nourished.   HENT:   Head: Normocephalic and atraumatic.   Nose: Nose normal.   Mouth/Throat: Oropharynx is clear and moist.   Eyes: Conjunctivae and EOM are normal. Pupils are equal, round, and reactive to light.   Neck: Neck supple. No thyromegaly present. No tracheal deviation present. No JVD present.   Normal range of motion.  Cardiovascular:  Normal rate, regular rhythm, normal heart sounds and intact distal pulses.           No murmur  heard.  Pulmonary/Chest: Breath sounds normal. No stridor. No respiratory distress. She has no wheezes. She has no rales.   Abdominal: Abdomen is soft. Bowel sounds are normal.   Musculoskeletal:         General: No edema. Normal range of motion.      Cervical back: Normal range of motion and neck supple.     Neurological: She is alert and oriented to person, place, and time.   Skin: Skin is warm. Capillary refill takes less than 2 seconds.   Psychiatric: She has a normal mood and affect. Thought content normal.       ED Course   Procedures  Labs Reviewed     EKG Readings: (Independently Interpreted)   Initial Reading: No STEMI. Rhythm: Normal Sinus Rhythm. Ectopy: No Ectopy. Conduction: Normal.   ECG Results              EKG 12-lead (Final result)  Result time 04/08/23 16:54:16      Final result by Interface, Lab In [facility] (04/08/23 16:54:16)                   Narrative:    Test Reason : R07.9,    Vent. Rate : 085 BPM     Atrial Rate : 085 BPM     P-R Int : 132 ms          QRS Dur : 076 ms      QT Int : 354 ms       P-R-T Axes : 052 032 000 degrees     QTc Int : 421 ms    Sinus rhythm with marked sinus arrythmia  Possible Left atrial enlargement  Nonspecific T wave abnormality  Abnormal ECG  When compared with ECG of 24-NOV-2022 02:07,  No significant change was found  Confirmed by Pankaj Kirk MD ([id number]) on 4/8/2023 4:54:06 PM    Referred By: [id number]   SELF           Confirmed By:Pankaj Kirk MD                                  Imaging Results              X-Ray Chest AP Portable (Final result)  Result time 04/01/23 08:40:45      Final result by Marin Mckeon MD (04/01/23 08:40:45)                   Narrative:    CLINICAL HISTORY:  47 years (1975) Female chest pain Chest pain    TECHNIQUE:  Portable AP radiograph the chest.    COMPARISON:  Radiograph November 24, 2022.    FINDINGS:  Mildly increased central hilar interstitial opacities are seen bilaterally suggestive of either mild edema,  atypical infection/pneumonia or bronchitis in the appropriate clinical setting. There is blunting of both costophrenic angles consistent with trace pleural effusions and adjacent atelectasis. No pneumothorax is identified. The heart is mildly enlarged.  Osseous structures show degenerative changes in the spine. The visualized upper abdomen is unremarkable.    IMPRESSION:  Findings compatible with mild pulmonary vascular congestion/interstitial pulmonary edema as above.                  .            Electronically signed by:  Marin Mckeon MD  4/1/2023 8:40 AM CDT Workstation: [id number]                                      Lower Extremity Veins Right (Final result)  Result time 04/01/23 07:13:01      Final result by Tomer Willams MD (04/01/23 07:13:01)                   Narrative:    Exam: Right lower extremity venous Doppler    INDICATION: Right lower extremity pain    FINDINGS:    On grayscale imaging there is compressibility of the common femoral, femoral, and popliteal veins. Color-flow is noted in all venous segments including the tibial veins. Waveforms are normal.    IMPRESSION:    No evidence of deep vein thrombosis involving the right lower extremity.    Electronically signed by:  Tomer Willams MD  4/1/2023 7:13 AM CDT Workstation: [id number]                                     CT Head Without Contrast (Final result)  Result time 04/01/23 06:12:55      Final result by Tomer Willams MD (04/01/23 06:12:55)                   Narrative:    Exam: Unenhanced CT scan of brain    INDICATION: Headache    TECHNIQUE:    Axial images in 5 mm intervals were obtained through the brain without the administration of intravenous contrast material.    CMS MANDATED QUALITY DATA - CT RADIATION  436    All CT scans at this facility utilize dose modulation, iterative reconstruction, and/or weight based dosing when appropriate to reduce radiation dose to as low as reasonably achievable.    FINDINGS:    The gray-white  matter demonstrates an unremarkable appearance. There is no evidence of an intracranial hemorrhage. There is no evidence of an intracranial mass. Ventricular system is normal. Basal cisterns are maintained. Posterior fossa is unremarkable. Orbits are symmetric. Calvarium demonstrates an unremarkable appearance.    IMPRESSION:    Normal exam.    Electronically signed by:  Tomer Willams MD  4/1/2023 6:12 AM CDT Workstation: [id number]                                  X-Rays:   Independently Interpreted Readings:   Other Readings:  Chest x-ray does not show any acute felt hydrate  Medications   HYDROmorphone (DILAUDID) 1 mg/mL injection (has no administration in time range)   ondansetron 4 mg/2 mL injection (has no administration in time range)   diphenhydrAMINE (BENADRYL) 50 mg/mL injection (has no administration in time range)   HYDROmorphone (DILAUDID) 1 mg/mL injection (has no administration in time range)   promethazine (PHENERGAN) 25 mg/mL injection (has no administration in time range)   valproate (DEPACON) 500 mg in dextrose 5 % (D5W) 50 mL IVPB (0 mg Intravenous Stopped 4/3/23 0006)   diphenhydrAMINE injection 12.5 mg (12.5 mg Intravenous Given 4/1/23 2259)   metoclopramide HCl injection 10 mg (10 mg Intravenous Given 4/2/23 0456)   metoclopramide HCl injection 10 mg (10 mg Intravenous Given 4/2/23 2012)   iodixanoL (VISIPAQUE 320) injection 100 mL (100 mLs Intravenous Given 4/2/23 1012)   magnesium sulfate 2g in water 50mL IVPB (premix) (0 g Intravenous Stopped 4/2/23 1902)   dexAMETHasone injection 10 mg (10 mg Intravenous Given 4/2/23 1219)   sodium chloride 0.9% bolus 1,000 mL 1,000 mL (0 mLs Intravenous Stopped 4/3/23 1345)     Medical Decision Making:   Differential Diagnosis:   47-year-old female presented emergency department with diffuse chest pain and as a headache.  Patient does have pain consistent with sickle cell pain crisis.  Screening labs and cardiac workup reviewed.  Pain control.  Patient  hydrated.  Given persistent symptoms Hospital Medicine consulted for further evaluation and management and treatment.  Independently Interpreted Test(s):   I have ordered and independently interpreted X-rays - see prior notes.  I have ordered and independently interpreted EKG Reading(s) - see prior notes  Clinical Tests:   Lab Tests: Reviewed  Radiological Study: Reviewed  Medical Tests: Reviewed                        Clinical Impression:   Final diagnoses:  [D57.00] Sickle cell pain crisis (Primary)  [R52] Pain  [R07.9] Chest pain        ED Disposition Condition    Admit Stable                Anel Zepeda MD  04/02/23 0446       Anel Zepeda MD  04/20/23 0137

## 2023-04-03 LAB
ALBUMIN SERPL BCP-MCNC: 4.2 G/DL (ref 3.5–5.2)
ALP SERPL-CCNC: 85 U/L (ref 55–135)
ALT SERPL W/O P-5'-P-CCNC: 23 U/L (ref 10–44)
ANION GAP SERPL CALC-SCNC: 10 MMOL/L (ref 8–16)
AST SERPL-CCNC: 31 U/L (ref 10–40)
BILIRUB SERPL-MCNC: 1.8 MG/DL (ref 0.1–1)
BUN SERPL-MCNC: 9 MG/DL (ref 6–20)
CALCIUM SERPL-MCNC: 9.4 MG/DL (ref 8.7–10.5)
CHLORIDE SERPL-SCNC: 106 MMOL/L (ref 95–110)
CO2 SERPL-SCNC: 22 MMOL/L (ref 23–29)
CREAT SERPL-MCNC: 0.5 MG/DL (ref 0.5–1.4)
ERYTHROCYTE [DISTWIDTH] IN BLOOD BY AUTOMATED COUNT: 19.3 % (ref 11.5–14.5)
EST. GFR  (NO RACE VARIABLE): >60 ML/MIN/1.73 M^2
GLUCOSE SERPL-MCNC: 137 MG/DL (ref 70–110)
HCT VFR BLD AUTO: 24.3 % (ref 37–48.5)
HGB BLD-MCNC: 8 G/DL (ref 12–16)
MAGNESIUM SERPL-MCNC: 2.2 MG/DL (ref 1.6–2.6)
MCH RBC QN AUTO: 31.7 PG (ref 27–31)
MCHC RBC AUTO-ENTMCNC: 32.9 G/DL (ref 32–36)
MCV RBC AUTO: 96 FL (ref 82–98)
PHOSPHATE SERPL-MCNC: 2.1 MG/DL (ref 2.7–4.5)
PLATELET # BLD AUTO: 435 K/UL (ref 150–450)
PMV BLD AUTO: 10 FL (ref 9.2–12.9)
POTASSIUM SERPL-SCNC: 4 MMOL/L (ref 3.5–5.1)
PROT SERPL-MCNC: 8.5 G/DL (ref 6–8.4)
RBC # BLD AUTO: 2.52 M/UL (ref 4–5.4)
RETICS/RBC NFR AUTO: 5.9 % (ref 0.5–2.5)
SODIUM SERPL-SCNC: 138 MMOL/L (ref 136–145)
WBC # BLD AUTO: 11.28 K/UL (ref 3.9–12.7)

## 2023-04-03 PROCEDURE — 36415 COLL VENOUS BLD VENIPUNCTURE: CPT | Performed by: INTERNAL MEDICINE

## 2023-04-03 PROCEDURE — 85027 COMPLETE CBC AUTOMATED: CPT | Performed by: INTERNAL MEDICINE

## 2023-04-03 PROCEDURE — 84100 ASSAY OF PHOSPHORUS: CPT | Performed by: INTERNAL MEDICINE

## 2023-04-03 PROCEDURE — 85045 AUTOMATED RETICULOCYTE COUNT: CPT | Performed by: INTERNAL MEDICINE

## 2023-04-03 PROCEDURE — 25000003 PHARM REV CODE 250: Performed by: STUDENT IN AN ORGANIZED HEALTH CARE EDUCATION/TRAINING PROGRAM

## 2023-04-03 PROCEDURE — 25000003 PHARM REV CODE 250: Performed by: INTERNAL MEDICINE

## 2023-04-03 PROCEDURE — 63600175 PHARM REV CODE 636 W HCPCS: Performed by: HOSPITALIST

## 2023-04-03 PROCEDURE — 25000003 PHARM REV CODE 250: Performed by: HOSPITALIST

## 2023-04-03 PROCEDURE — 80053 COMPREHEN METABOLIC PANEL: CPT | Performed by: INTERNAL MEDICINE

## 2023-04-03 PROCEDURE — 83735 ASSAY OF MAGNESIUM: CPT | Performed by: INTERNAL MEDICINE

## 2023-04-03 PROCEDURE — 63600175 PHARM REV CODE 636 W HCPCS: Performed by: INTERNAL MEDICINE

## 2023-04-03 PROCEDURE — 12000002 HC ACUTE/MED SURGE SEMI-PRIVATE ROOM

## 2023-04-03 RX ORDER — METOCLOPRAMIDE HYDROCHLORIDE 5 MG/ML
10 INJECTION INTRAMUSCULAR; INTRAVENOUS EVERY 8 HOURS
Status: DISCONTINUED | OUTPATIENT
Start: 2023-04-03 | End: 2023-04-04 | Stop reason: HOSPADM

## 2023-04-03 RX ORDER — DIPHENHYDRAMINE HYDROCHLORIDE 50 MG/ML
25 INJECTION INTRAMUSCULAR; INTRAVENOUS EVERY 8 HOURS
Status: DISCONTINUED | OUTPATIENT
Start: 2023-04-03 | End: 2023-04-04 | Stop reason: HOSPADM

## 2023-04-03 RX ADMIN — MORPHINE SULFATE 30 MG: 30 TABLET, EXTENDED RELEASE ORAL at 09:04

## 2023-04-03 RX ADMIN — TOPIRAMATE 100 MG: 25 TABLET, FILM COATED ORAL at 09:04

## 2023-04-03 RX ADMIN — HYDROMORPHONE HYDROCHLORIDE 2 MG: 1 INJECTION, SOLUTION INTRAMUSCULAR; INTRAVENOUS; SUBCUTANEOUS at 04:04

## 2023-04-03 RX ADMIN — OXYCODONE HYDROCHLORIDE 15 MG: 5 TABLET ORAL at 09:04

## 2023-04-03 RX ADMIN — ZOLPIDEM TARTRATE 10 MG: 5 TABLET, COATED ORAL at 08:04

## 2023-04-03 RX ADMIN — MORPHINE SULFATE 30 MG: 30 TABLET, EXTENDED RELEASE ORAL at 08:04

## 2023-04-03 RX ADMIN — HYDROXYUREA 1000 MG: 500 CAPSULE ORAL at 09:04

## 2023-04-03 RX ADMIN — OXYCODONE HYDROCHLORIDE 15 MG: 5 TABLET ORAL at 04:04

## 2023-04-03 RX ADMIN — HYDROMORPHONE HYDROCHLORIDE 2 MG: 1 INJECTION, SOLUTION INTRAMUSCULAR; INTRAVENOUS; SUBCUTANEOUS at 05:04

## 2023-04-03 RX ADMIN — PROMETHAZINE HYDROCHLORIDE 12.5 MG: 25 INJECTION INTRAMUSCULAR; INTRAVENOUS at 10:04

## 2023-04-03 RX ADMIN — SENNOSIDES AND DOCUSATE SODIUM 2 TABLET: 50; 8.6 TABLET ORAL at 08:04

## 2023-04-03 RX ADMIN — OXYCODONE HYDROCHLORIDE 15 MG: 5 TABLET ORAL at 12:04

## 2023-04-03 RX ADMIN — DEXTROSE 500 MG: 50 INJECTION, SOLUTION INTRAVENOUS at 01:04

## 2023-04-03 RX ADMIN — DEXTROSE 500 MG: 50 INJECTION, SOLUTION INTRAVENOUS at 08:04

## 2023-04-03 RX ADMIN — SODIUM CHLORIDE 1000 ML: 0.9 INJECTION, SOLUTION INTRAVENOUS at 12:04

## 2023-04-03 RX ADMIN — FOLIC ACID 1 MG: 1 TABLET ORAL at 09:04

## 2023-04-03 RX ADMIN — BUTALBITAL, ACETAMINOPHEN, AND CAFFEINE 1 TABLET: 50; 325; 40 TABLET, COATED ORAL at 09:04

## 2023-04-03 RX ADMIN — DIPHENHYDRAMINE HYDROCHLORIDE 25 MG: 50 INJECTION, SOLUTION INTRAMUSCULAR; INTRAVENOUS at 12:04

## 2023-04-03 RX ADMIN — ENOXAPARIN SODIUM 40 MG: 100 INJECTION SUBCUTANEOUS at 05:04

## 2023-04-03 RX ADMIN — TOPIRAMATE 100 MG: 25 TABLET, FILM COATED ORAL at 08:04

## 2023-04-03 RX ADMIN — DIPHENHYDRAMINE HYDROCHLORIDE 25 MG: 50 INJECTION, SOLUTION INTRAMUSCULAR; INTRAVENOUS at 09:04

## 2023-04-03 RX ADMIN — HYDROMORPHONE HYDROCHLORIDE 2 MG: 1 INJECTION, SOLUTION INTRAMUSCULAR; INTRAVENOUS; SUBCUTANEOUS at 10:04

## 2023-04-03 RX ADMIN — METOCLOPRAMIDE 10 MG: 5 INJECTION, SOLUTION INTRAMUSCULAR; INTRAVENOUS at 12:04

## 2023-04-03 RX ADMIN — DIPHENHYDRAMINE HYDROCHLORIDE 25 MG: 50 INJECTION, SOLUTION INTRAMUSCULAR; INTRAVENOUS at 05:04

## 2023-04-03 RX ADMIN — METOCLOPRAMIDE 10 MG: 5 INJECTION, SOLUTION INTRAMUSCULAR; INTRAVENOUS at 09:04

## 2023-04-03 NOTE — PROGRESS NOTES
[facility]  Department of Neurology  Progress Note  Date: 2023 9:43 AM          Patient Name: Sunita Mendez   MRN: [id number]   : 1975    AGE: 47 y.o.    LOS: 2 days Hospital Day: 3  Admit date: 2023  5:52 AM       HPI per EMR:  47-year-old woman with sickle cell anemia presenting with pain.  Patient reports she has had sickle cell pain of her whole body for the past 3 weeks progressively worsening despite her home oral pain medication.  She has had severe migraines for the past 1 week consisting of nausea and photosensitivity.  She complains of pain in multiple areas including a twisting pain in her abdomen and lower extremity pain bilaterally.  She is been able to eat without issue, has bowel movements daily, and denies fever, chills, dysuria, cough, sick contacts, difficulty breathing, wheezing, chest pain.  In the ED, she still complains of pain despite IV medications.        Overview/Hospital Course:  Patient with a history of migraine headache, on topiramate, states she has a neurology referral at [facility].  Also known history of sickle cell disease, followed by hematologist Dr. Michaels, she is on Hydrea, intermittently taking Endari, on chronic opioid medication, review of  on 23 morphine 30 mg, 30 day supply, 60 tablets, oxycodone 15 mg, 30 day supply, 180 tablets, 23 alprazolam 1 mg, 30 day supply, 30 tablets.  She states she began to feel well over the last 3 weeks, states her PCP checked her blood count and recommended possible transfusion but she could not arrange childcare at home, her mother was assisting her, she was staying mostly in bed.  Over the last few days migraine returned, not improved which prompted ED presentation.  Reports 1 year history of intermittent twisting abdominal pain, having regular bowel movements.  Afebrile, labs with hemoglobin 7.6, T bilirubin 1.6, EKG sinus rhythm, ultrasound no DVT, chest x-ray with concern for vascular prominence, CT  head no acute, she was admitted with acute pain crisis.  On 04/01, still with headache, ordered migraine medication including scheduled Benadryl, Reglan, continue IV fluid, Neurology evaluation.     Neurology Consult:  Patient was seen examined by me today.  She is a 47-year-old woman with history of sickle cell anemia presenting with whole-body pain as well as worsening migraines for the past week.  She states that she suffers from migraines since she was 14 years old, and years ago was put on topiramate which seemed to help the beginning but is no longer helping with the prevention.  She states that this migraine attack has lasted at least 2 or 3 days, and nothing has helped the pain.  Usually Dilaudid works, but she states that it has not helped this time.  She also says that this headache has been more intense than prior events, is throbbing, associated to nausea, vomiting as well as light and sound sensitivity.  She has received Benadryl, Reglan, IV fluids with no improvement of the migraines.  Neurology consulted for assistance.  Patient denies focal weakness, numbness, tingling, vision loss or slurred speech.    04/03/2023: No acute events overnight. Patient was seen and examined by me this morning. Neuro exam is normal.  She states that she continues to have headache and only Dilaudid helped her headache this morning.  She received 2 doses of valproic acid.       Vitals:  Patient Vitals for the past 24 hrs:   BP Temp Temp src Pulse Resp SpO2   04/03/23 0920 -- -- -- -- 16 --   04/03/23 0842 -- -- -- (P) 103 (P) 15 (P) 96 %   04/03/23 0757 (!) 153/71 98.7 °F (37.1 °C) Oral (!) 112 18 96 %   04/03/23 0449 -- -- -- -- 18 --   04/03/23 0429 100/75 98.4 °F (36.9 °C) -- 98 18 97 %   04/03/23 0428 -- -- -- -- 18 --   04/03/23 0019 138/80 98.2 °F (36.8 °C) -- 102 20 97 %   04/02/23 2128 -- -- -- -- 20 --   04/02/23 2011 -- -- -- -- 18 --   04/02/23 2008 (!) 148/84 98.2 °F (36.8 °C) -- 92 18 96 %   04/02/23 1722 (!)  149/82 98.3 °F (36.8 °C) Oral 109 18 98 %   04/02/23 1643 -- -- -- -- 16 --   04/02/23 1509 -- -- -- -- 16 --   04/02/23 1226 126/71 98 °F (36.7 °C) Axillary 90 18 100 %   04/02/23 1221 -- -- -- -- 16 --     PHYSICAL EXAM:     GENERAL APPEARANCE: Well-developed, well-nourished female in no acute distress.  HEENT: Normocephalic and atraumatic. PERRL. Oropharynx unremarkable.  PULM: Comfortable on room air.  CV: RRR.  ABDOMEN: Soft, nontender.  EXTREMITIES: No signs of vascular compromise. Pulses present. No cyanosis, clubbing or edema.  SKIN: Clear; no rashes, lesions or skin breaks in exposed areas.      NEURO:   MENTAL STATUS: Patient awake and oriented to time, place, and person. Affect normal.  CRANIAL NERVES II-XII: Pupils equal, round and reactive to light. Extraocular movements full and intact. No facial asymmetry.  MOTOR: Normal bulk. Tone normal and symmetrical throughout.  No abnormal movements. No tremor.   Strength 5/5 throughout unless specified below.  REFLEXES: DTRs 2+; normal and symmetric throughout.   SENSATION: Sensation grossly intact to fine touch.  COORDINATION: Finger-to-nose normal for age and symmetric.  STATION: Romberg deferred.  GAIT: Deferred.    CURRENT SCHEDULED MEDICATIONS:   enoxparin  40 mg Subcutaneous Daily    folic acid  1 mg Oral Daily    hydroxyurea  1,000 mg Oral Daily    morphine  30 mg Oral Q12H    polyethylene glycol  17 g Oral Daily    senna-docusate 8.6-50 mg  2 tablet Oral BID    topiramate  100 mg Oral BID    zolpidem  10 mg Oral QHS     CURRENT INFUSIONS:   sodium chloride 0.9% 100 mL/hr at 04/02/23 1134     DATA:  Recent Labs   Lab 04/01/23  0907 04/02/23  0551 04/03/23  0518    136 138   K 3.8 3.9 4.0    109 106   CO2 27 25 22*   BUN 7 7 9   CREATININE 0.6 0.5 0.5   * 164* 137*   CALCIUM 8.7 8.7 9.4   PHOS  --  3.3 2.1*   MG 1.8 1.7 2.2   AST 30  --  31   ALT 19  --  23     Recent Labs   Lab 04/01/23  0907 04/02/23  0551 04/03/23  0518   WBC 11.92  10.95 11.28   HGB 7.8* 7.4* 8.0*   HCT 23.8* 22.0* 24.3*    382 435     No results found for: PROTEINCSF, GLUCCSF, WBCCSF, RBCCSF, PMNCSF  No results found for: HGBA1C         I have personally reviewed and interpreted the pertinent imaging and lab results.  Imaging Results              X-Ray Chest AP Portable (Final result)  Result time 04/01/23 08:40:45      Final result by Marin Mckeon MD (04/01/23 08:40:45)                   Narrative:    CLINICAL HISTORY:  47 years (1975) Female chest pain Chest pain    TECHNIQUE:  Portable AP radiograph the chest.    COMPARISON:  Radiograph November 24, 2022.    FINDINGS:  Mildly increased central hilar interstitial opacities are seen bilaterally suggestive of either mild edema, atypical infection/pneumonia or bronchitis in the appropriate clinical setting. There is blunting of both costophrenic angles consistent with trace pleural effusions and adjacent atelectasis. No pneumothorax is identified. The heart is mildly enlarged.  Osseous structures show degenerative changes in the spine. The visualized upper abdomen is unremarkable.    IMPRESSION:  Findings compatible with mild pulmonary vascular congestion/interstitial pulmonary edema as above.                  .            Electronically signed by:  Marin Mckeon MD  4/1/2023 8:40 AM CDT Workstation: [id number]                                     US Lower Extremity Veins Right (Final result)  Result time 04/01/23 07:13:01      Final result by Tomer Willams MD (04/01/23 07:13:01)                   Narrative:    Exam: Right lower extremity venous Doppler    INDICATION: Right lower extremity pain    FINDINGS:    On grayscale imaging there is compressibility of the common femoral, femoral, and popliteal veins. Color-flow is noted in all venous segments including the tibial veins. Waveforms are normal.    IMPRESSION:    No evidence of deep vein thrombosis involving the right lower  extremity.    Electronically signed by:  Tomer Willams MD  4/1/2023 7:13 AM CDT Workstation: [id number]                                     CT Head Without Contrast (Final result)  Result time 04/01/23 06:12:55      Final result by Tomer Willams MD (04/01/23 06:12:55)                   Narrative:    Exam: Unenhanced CT scan of brain    INDICATION: Headache    TECHNIQUE:    Axial images in 5 mm intervals were obtained through the brain without the administration of intravenous contrast material.    CMS MANDATED QUALITY DATA - CT RADIATION  436    All CT scans at this facility utilize dose modulation, iterative reconstruction, and/or weight based dosing when appropriate to reduce radiation dose to as low as reasonably achievable.    FINDINGS:    The gray-white matter demonstrates an unremarkable appearance. There is no evidence of an intracranial hemorrhage. There is no evidence of an intracranial mass. Ventricular system is normal. Basal cisterns are maintained. Posterior fossa is unremarkable. Orbits are symmetric. Calvarium demonstrates an unremarkable appearance.    IMPRESSION:    Normal exam.    Electronically signed by:  Tomer Willams MD  4/1/2023 6:12 AM CDT Workstation: [id number]                                            ASSESSMENT AND PLAN:       Status Migrainosus   History of Sickle Cell Disease on crisis  47-year-old woman with history of sickle cell anemia presenting with whole-body pain as well as worsening migraines for the past week.   She states that this migraine attack has lasted at least 2 or 3 days, and nothing has helped the pain. She has received Benadryl, Reglan, IV fluids with no improvement of the migraines.  Neurology consulted for assistance. Neuro exam is non focal.      - Admitted to hospital medicine with q4 hour neuro checks  - Continue Depacon 500 mg IV TID for abortive treatment of status migrainosus  - Continue with Benadryl and Reglan every 8 hours  - increased topiramate to 100  mg b.i.d. for prevention of migraines  - normal saline infusion 150 mL/hr  - MRI brain without contrast which showed no evidence of stroke or other abnormality, no T2 hyperintensities on FLAIR and no hemorrhage  - CTA head and neck ordered to rule out carotid stenosis which is common in sickle cell patients, but resulted normal with no evidence of stenosis, occlusion or aneurysms  - patient will need follow-up as outpatient with Neurology for management of migraine headaches  - workup and treatment of metabolic and infectious abnormalities as per primary team  - Will follow along         Braeden Landon MD  Neurology/vascular Neurology  Date of Service: 04/03/2023  9:43 AM    Please note: This note was transcribed using voice recognition software. Because of this technology there are often uinintended grammatical, spelling, and other transcription errors. Please disregard these errors.

## 2023-04-03 NOTE — CARE UPDATE
04/03/23 0842   PRE-TX-O2   Device (Oxygen Therapy) room air   $ Is the patient on Low Flow Oxygen? Yes  (nc on sb)   SpO2 96 %   Pulse Oximetry Type Intermittent   $ Pulse Oximetry - Multiple Charge Pulse Oximetry - Multiple   Pulse 103   Resp 15   Respiratory Evaluation   $ Care Plan Tech Time 15 min

## 2023-04-03 NOTE — SUBJECTIVE & OBJECTIVE
Interval History:  Patient reports last night she did have throbbing headache associated with nausea but headache is improved today.  She is about to ambulate the hallways.  Tolerating oral diet.  Urinating well.  Afebrile, hemoglobin 8, reticulocyte count 5.9.  Discussed with patient.  Discussed with neurologist.    Review of Systems   Constitutional:  Negative for fever.   Genitourinary:  Negative for difficulty urinating.   Neurological:  Positive for headaches.   Psychiatric/Behavioral:  Negative for confusion.    Objective:     Vital Signs (Most Recent):  Temp: 98.4 °F (36.9 °C) (04/03/23 1557)  Pulse: 108 (04/03/23 1557)  Resp: 18 (04/03/23 1557)  BP: 134/76 (04/03/23 1557)  SpO2: 97 % (04/03/23 1557) Vital Signs (24h Range):  Temp:  [98.1 °F (36.7 °C)-98.7 °F (37.1 °C)] 98.4 °F (36.9 °C)  Pulse:  [] 108  Resp:  [15-20] 18  SpO2:  [96 %-98 %] 97 %  BP: (100-153)/(71-84) 134/76     Weight: 87.8 kg (193 lb 9 oz)  Body mass index is 35.4 kg/m².    Intake/Output Summary (Last 24 hours) at 4/3/2023 1610  Last data filed at 4/3/2023 0826  Gross per 24 hour   Intake 780 ml   Output --   Net 780 ml      Physical Exam  Vitals and nursing note reviewed.   Constitutional:       Comments: Sitting in bed   HENT:      Head: Normocephalic and atraumatic.      Mouth/Throat:      Mouth: Mucous membranes are moist.   Eyes:      Conjunctiva/sclera: Conjunctivae normal.   Cardiovascular:      Rate and Rhythm: Normal rate and regular rhythm.   Pulmonary:      Effort: No respiratory distress.      Breath sounds: No stridor. No wheezing.      Comments: On NC  Abdominal:      Palpations: Abdomen is soft.      Tenderness: There is no abdominal tenderness.   Musculoskeletal:      Cervical back: Neck supple.   Skin:     General: Skin is warm.   Neurological:      Mental Status: She is alert and oriented to person, place, and time.   Psychiatric:         Mood and Affect: Mood normal.       Significant Labs: BMP:   Recent Labs   Lab  04/03/23  0518   *      K 4.0      CO2 22*   BUN 9   CREATININE 0.5   CALCIUM 9.4   MG 2.2     CBC:   Recent Labs   Lab 04/02/23  0551 04/03/23  0518   WBC 10.95 11.28   HGB 7.4* 8.0*   HCT 22.0* 24.3*    435     CMP:   Recent Labs   Lab 04/02/23 0551 04/03/23 0518    138   K 3.9 4.0    106   CO2 25 22*   * 137*   BUN 7 9   CREATININE 0.5 0.5   CALCIUM 8.7 9.4   PROT  --  8.5*   ALBUMIN  --  4.2   BILITOT  --  1.8*   ALKPHOS  --  85   AST  --  31   ALT  --  23   ANIONGAP 2* 10     Magnesium:   Recent Labs   Lab 04/02/23  0551 04/03/23  0518   MG 1.7 2.2       Significant Imaging: I have reviewed all pertinent imaging results/findings within the past 24 hours.

## 2023-04-03 NOTE — PROGRESS NOTES
[facility] Medicine  Progress Note    Patient Name: Sunita Mendez  MRN: [id number]  Patient Class: IP- Inpatient   Admission Date: 4/1/2023  Length of Stay: 2 days  Attending Physician: Deja Fam MD  Primary Care Provider: Primary Doctor No        Subjective:     Principal Problem:Migraine        HPI:  47-year-old woman with sickle cell anemia presenting with pain.  Patient reports she has had sickle cell pain of her whole body for the past 3 weeks progressively worsening despite her home oral pain medication.  She has had severe migraines for the past 1 week consisting of nausea and photosensitivity.  She complains of pain in multiple areas including a twisting pain in her abdomen and lower extremity pain bilaterally.  She is been able to eat without issue, has bowel movements daily, and denies fever, chills, dysuria, cough, sick contacts, difficulty breathing, wheezing, chest pain.  In the ED, she still complains of pain despite IV medications.      Overview/Hospital Course:  Patient with a history of migraine headache, on topiramate, states she has a neurology referral at [facility].  Also known history of sickle cell disease, followed by hematologist Dr. Michaels, she is on Hydrea, intermittently taking Endari, on chronic opioid medication, review of  on 2/2/23 morphine 30 mg, 30 day supply, 60 tablets, oxycodone 15 mg, 30 day supply, 180 tablets, 2/23/23 alprazolam 1 mg, 30 day supply, 30 tablets.  She states she began to feel well over the last 3 weeks, states her PCP checked her blood count and recommended possible transfusion but she could not arrange childcare at home, her mother was assisting her, she was staying mostly in bed.  Over the last few days migraine returned, not improved which prompted ED presentation.  Reports 1 year history of intermittent twisting abdominal pain, having regular bowel movements.  Afebrile, labs with hemoglobin 7.6, T bilirubin 1.6, EKG sinus rhythm,  ultrasound no DVT, chest x-ray with concern for vascular prominence, CT head no acute, she was admitted with acute pain crisis.  On 04/01, still with headache, ordered migraine medication including scheduled Benadryl, Reglan, continue IV fluid, Neurology evaluation.  On 04/02 headache still present, nausea with no emesis, MRI brain no acute, CTA negative, Neurology doing 3 doses of Depacon, dose of dexamethasone, 2 g IV magnesium, monitoring.      Interval History:  Patient reports last night she did have throbbing headache associated with nausea but headache is improved today.  She is about to ambulate the hallways.  Tolerating oral diet.  Urinating well.  Afebrile, hemoglobin 8, reticulocyte count 5.9.  Discussed with patient.  Discussed with neurologist.    Review of Systems   Constitutional:  Negative for fever.   Genitourinary:  Negative for difficulty urinating.   Neurological:  Positive for headaches.   Psychiatric/Behavioral:  Negative for confusion.    Objective:     Vital Signs (Most Recent):  Temp: 98.4 °F (36.9 °C) (04/03/23 1557)  Pulse: 108 (04/03/23 1557)  Resp: 18 (04/03/23 1557)  BP: 134/76 (04/03/23 1557)  SpO2: 97 % (04/03/23 1557) Vital Signs (24h Range):  Temp:  [98.1 °F (36.7 °C)-98.7 °F (37.1 °C)] 98.4 °F (36.9 °C)  Pulse:  [] 108  Resp:  [15-20] 18  SpO2:  [96 %-98 %] 97 %  BP: (100-153)/(71-84) 134/76     Weight: 87.8 kg (193 lb 9 oz)  Body mass index is 35.4 kg/m².    Intake/Output Summary (Last 24 hours) at 4/3/2023 1610  Last data filed at 4/3/2023 0826  Gross per 24 hour   Intake 780 ml   Output --   Net 780 ml      Physical Exam  Vitals and nursing note reviewed.   Constitutional:       Comments: Sitting in bed   HENT:      Head: Normocephalic and atraumatic.      Mouth/Throat:      Mouth: Mucous membranes are moist.   Eyes:      Conjunctiva/sclera: Conjunctivae normal.   Cardiovascular:      Rate and Rhythm: Normal rate and regular rhythm.   Pulmonary:      Effort: No respiratory  distress.      Breath sounds: No stridor. No wheezing.      Comments: On NC  Abdominal:      Palpations: Abdomen is soft.      Tenderness: There is no abdominal tenderness.   Musculoskeletal:      Cervical back: Neck supple.   Skin:     General: Skin is warm.   Neurological:      Mental Status: She is alert and oriented to person, place, and time.   Psychiatric:         Mood and Affect: Mood normal.       Significant Labs: BMP:   Recent Labs   Lab 04/03/23  0518   *      K 4.0      CO2 22*   BUN 9   CREATININE 0.5   CALCIUM 9.4   MG 2.2     CBC:   Recent Labs   Lab 04/02/23  0551 04/03/23  0518   WBC 10.95 11.28   HGB 7.4* 8.0*   HCT 22.0* 24.3*    435     CMP:   Recent Labs   Lab 04/02/23  0551 04/03/23  0518    138   K 3.9 4.0    106   CO2 25 22*   * 137*   BUN 7 9   CREATININE 0.5 0.5   CALCIUM 8.7 9.4   PROT  --  8.5*   ALBUMIN  --  4.2   BILITOT  --  1.8*   ALKPHOS  --  85   AST  --  31   ALT  --  23   ANIONGAP 2* 10     Magnesium:   Recent Labs   Lab 04/02/23  0551 04/03/23  0518   MG 1.7 2.2       Significant Imaging: I have reviewed all pertinent imaging results/findings within the past 24 hours.      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  Will start on Hydrea today 500 mg each morning with repeat CBC and CMP in 2 weeks and recheck in 4 weeks.       Plan:  Continue care on medical floor   Continue IV fluid hydration   Continue supplemental oxygen via nasal cannula   Depacon 500 mg t.i.d., continue IV Reglan and Benadryl q.6 hours, following headache  Topiramate increased to 100 mg b.i.d.  Continue p.r.n. pain control as ordered    A.m. labs ordered  Continue to increase activity, ambulate   Appreciate Neurology input   Further plan as per hospital course    VTE Risk Mitigation (From admission, onward)         Ordered     enoxaparin injection 40 mg  Daily         04/01/23 1647     IP VTE HIGH RISK PATIENT  Once         04/01/23 0807     Place sequential compression device  Until discontinued         04/01/23 0807                Discharge Planning   CORRINE: 4/4/2023     Code Status: Full Code   Is the patient medically ready for discharge?:     Reason for patient still in hospital (select all that apply): Patient trending condition  Discharge Plan A: Home with family                  Deja Fam MD  Department of Hospital Medicine   Formerly [facility], later [facility]

## 2023-04-04 VITALS
DIASTOLIC BLOOD PRESSURE: 59 MMHG | OXYGEN SATURATION: 97 % | TEMPERATURE: 99 F | WEIGHT: 193.56 LBS | SYSTOLIC BLOOD PRESSURE: 123 MMHG | BODY MASS INDEX: 35.62 KG/M2 | HEIGHT: 62 IN | RESPIRATION RATE: 18 BRPM | HEART RATE: 90 BPM

## 2023-04-04 PROBLEM — G43.909 MIGRAINE: Status: RESOLVED | Noted: 2023-04-01 | Resolved: 2023-04-04

## 2023-04-04 LAB
ANION GAP SERPL CALC-SCNC: 4 MMOL/L (ref 8–16)
BUN SERPL-MCNC: 9 MG/DL (ref 6–20)
CALCIUM SERPL-MCNC: 8.4 MG/DL (ref 8.7–10.5)
CHLORIDE SERPL-SCNC: 113 MMOL/L (ref 95–110)
CO2 SERPL-SCNC: 23 MMOL/L (ref 23–29)
CREAT SERPL-MCNC: 0.5 MG/DL (ref 0.5–1.4)
CREAT SERPL-MCNC: 2.8 MG/DL (ref 0.5–1.4)
ERYTHROCYTE [DISTWIDTH] IN BLOOD BY AUTOMATED COUNT: 19.6 % (ref 11.5–14.5)
EST. GFR  (NO RACE VARIABLE): >60 ML/MIN/1.73 M^2
GLUCOSE SERPL-MCNC: 193 MG/DL (ref 70–110)
HCT VFR BLD AUTO: 21.6 % (ref 37–48.5)
HGB BLD-MCNC: 7.1 G/DL (ref 12–16)
MAGNESIUM SERPL-MCNC: 1.8 MG/DL (ref 1.6–2.6)
MCH RBC QN AUTO: 31.8 PG (ref 27–31)
MCHC RBC AUTO-ENTMCNC: 32.9 G/DL (ref 32–36)
MCV RBC AUTO: 97 FL (ref 82–98)
PHOSPHATE SERPL-MCNC: 3.9 MG/DL (ref 2.7–4.5)
PLATELET # BLD AUTO: 375 K/UL (ref 150–450)
PMV BLD AUTO: 10.4 FL (ref 9.2–12.9)
POTASSIUM SERPL-SCNC: 4 MMOL/L (ref 3.5–5.1)
RBC # BLD AUTO: 2.23 M/UL (ref 4–5.4)
SAMPLE: ABNORMAL
SODIUM SERPL-SCNC: 140 MMOL/L (ref 136–145)
WBC # BLD AUTO: 15.39 K/UL (ref 3.9–12.7)

## 2023-04-04 PROCEDURE — 25000003 PHARM REV CODE 250: Performed by: STUDENT IN AN ORGANIZED HEALTH CARE EDUCATION/TRAINING PROGRAM

## 2023-04-04 PROCEDURE — 84100 ASSAY OF PHOSPHORUS: CPT | Performed by: INTERNAL MEDICINE

## 2023-04-04 PROCEDURE — 63600175 PHARM REV CODE 636 W HCPCS: Performed by: INTERNAL MEDICINE

## 2023-04-04 PROCEDURE — 25000003 PHARM REV CODE 250: Performed by: INTERNAL MEDICINE

## 2023-04-04 PROCEDURE — 27000221 HC OXYGEN, UP TO 24 HOURS

## 2023-04-04 PROCEDURE — 94761 N-INVAS EAR/PLS OXIMETRY MLT: CPT

## 2023-04-04 PROCEDURE — 83735 ASSAY OF MAGNESIUM: CPT | Performed by: INTERNAL MEDICINE

## 2023-04-04 PROCEDURE — 85027 COMPLETE CBC AUTOMATED: CPT | Performed by: INTERNAL MEDICINE

## 2023-04-04 PROCEDURE — 36415 COLL VENOUS BLD VENIPUNCTURE: CPT | Performed by: INTERNAL MEDICINE

## 2023-04-04 PROCEDURE — 99900035 HC TECH TIME PER 15 MIN (STAT)

## 2023-04-04 PROCEDURE — 80048 BASIC METABOLIC PNL TOTAL CA: CPT | Performed by: INTERNAL MEDICINE

## 2023-04-04 PROCEDURE — 25000003 PHARM REV CODE 250: Performed by: HOSPITALIST

## 2023-04-04 RX ORDER — TOPIRAMATE 100 MG/1
100 TABLET, FILM COATED ORAL 2 TIMES DAILY
Qty: 60 TABLET | Refills: 0 | Status: SHIPPED | OUTPATIENT
Start: 2023-04-04 | End: 2023-05-04

## 2023-04-04 RX ADMIN — DIPHENHYDRAMINE HYDROCHLORIDE 25 MG: 50 INJECTION, SOLUTION INTRAMUSCULAR; INTRAVENOUS at 05:04

## 2023-04-04 RX ADMIN — DEXTROSE 500 MG: 50 INJECTION, SOLUTION INTRAVENOUS at 05:04

## 2023-04-04 RX ADMIN — SENNOSIDES AND DOCUSATE SODIUM 2 TABLET: 50; 8.6 TABLET ORAL at 10:04

## 2023-04-04 RX ADMIN — TOPIRAMATE 100 MG: 25 TABLET, FILM COATED ORAL at 10:04

## 2023-04-04 RX ADMIN — HYDROXYUREA 1000 MG: 500 CAPSULE ORAL at 10:04

## 2023-04-04 RX ADMIN — METOCLOPRAMIDE 10 MG: 5 INJECTION, SOLUTION INTRAMUSCULAR; INTRAVENOUS at 05:04

## 2023-04-04 RX ADMIN — HYDROMORPHONE HYDROCHLORIDE 2 MG: 1 INJECTION, SOLUTION INTRAMUSCULAR; INTRAVENOUS; SUBCUTANEOUS at 07:04

## 2023-04-04 RX ADMIN — MORPHINE SULFATE 30 MG: 30 TABLET, EXTENDED RELEASE ORAL at 10:04

## 2023-04-04 RX ADMIN — HYDROMORPHONE HYDROCHLORIDE 2 MG: 1 INJECTION, SOLUTION INTRAMUSCULAR; INTRAVENOUS; SUBCUTANEOUS at 12:04

## 2023-04-04 RX ADMIN — FOLIC ACID 1 MG: 1 TABLET ORAL at 10:04

## 2023-04-04 NOTE — DISCHARGE SUMMARY
[facility] Medicine  Discharge Summary      Patient Name: Sunita Mendez  MRN: [id number]  AMIRA: [id number]  Patient Class: IP- Inpatient  Admission Date: 4/1/2023  Hospital Length of Stay: 3 days  Discharge Date and Time: 4/4/2023  1:00 PM  Attending Physician: Cheyanne att. providers found   Discharging Provider: Deja Fam MD  Primary Care Provider: Primary Doctor Cheyanne    Primary Care Team: Networked reference to record PCT     HPI:   47-year-old woman with sickle cell anemia presenting with pain.  Patient reports she has had sickle cell pain of her whole body for the past 3 weeks progressively worsening despite her home oral pain medication.  She has had severe migraines for the past 1 week consisting of nausea and photosensitivity.  She complains of pain in multiple areas including a twisting pain in her abdomen and lower extremity pain bilaterally.  She is been able to eat without issue, has bowel movements daily, and denies fever, chills, dysuria, cough, sick contacts, difficulty breathing, wheezing, chest pain.  In the ED, she still complains of pain despite IV medications.      * No surgery found *      Hospital Course:   Patient with a history of migraine headache, on topiramate, states she has a neurology referral at [facility].  Also known history of sickle cell disease, followed by hematologist Dr. Michaels, she is on Hydrea, intermittently taking Endari, on chronic opioid medication, review of  on 2/2/23 morphine 30 mg, 30 day supply, 60 tablets, oxycodone 15 mg, 30 day supply, 180 tablets, 2/23/23 alprazolam 1 mg, 30 day supply, 30 tablets.  She states she began to feel well over the last 3 weeks, states her PCP checked her blood count and recommended possible transfusion but she could not arrange childcare at home, her mother was assisting her, she was staying mostly in bed.  Over the last few days migraine returned, not improved which prompted ED presentation. Afebrile, labs with  hemoglobin 7.6, T bilirubin 1.6, EKG sinus rhythm, ultrasound no DVT, chest x-ray with concern for vascular prominence, CT head no acute, she was admitted with acute pain crisis as well as migraine.  On 04/01, still with headache, ordered migraine medication including scheduled Benadryl, Reglan, continue IV fluid, Neurology evaluation.  On 04/02 headache still present, nausea with no emesis, MRI brain no acute, CTA negative, Neurology started on intravenous Depacon, dose of dexamethasone, 2 g IV magnesium, as well as increasing home topiramate to 100 mg b.i.d..  She was continued to be monitored in the hospital.  She symptomatically improved, remained without any neurological deficits, tolerating oral diet, ambulating in hallway.  Appears medically stable for discharge, cleared by consultant for discharge with outpatient follow-up.  Discussed with neurology on day of discharge, will continue increased dose of topiramate with outpatient follow-up.  Discharge plan including medication, follow-up as well as return precautions were reviewed with the patient, she expressed understanding, no questions or concerns.      Discharge examination   Sitting in bed, alert, oriented, regular heart rhythm, no focal deficits       Goals of Care Treatment Preferences:  Code Status: Full Code      Consults:   Consults (From admission, onward)        Status Ordering Provider     Inpatient consult to Neurology  Once        Provider:  Cande Yañez MD    Completed LOIS LAWTON          No new Assessment & Plan notes have been filed under this hospital service since the last note was generated.  Service: Hospital Medicine    Final Active Diagnoses:    Diagnosis Date Noted POA    PRINCIPAL PROBLEM:  Sickle cell anemia [D57.1] 12/07/2016 Yes    Sickle cell crisis [D57.00] 08/18/2020 Yes    Constipation [K59.00] 04/02/2023 Yes    Anxiety [F41.9] 04/01/2023 Yes     Chronic    Chronic, continuous use of opioids [F11.90]  04/01/2023 Yes     Chronic    Class 2 obesity in adult [E66.9] 04/01/2023 Yes     Chronic    History of pulmonary embolus (PE) [Z86.711] 03/11/2017 Yes      Problems Resolved During this Admission:    Diagnosis Date Noted Date Resolved POA    Status migrainous [G43.909] 04/01/2023 04/04/2023 Yes       Discharged Condition: good    Disposition: Home or Self Care    Follow Up:   Follow-up Information     Christiane Rosales DNP. Go in 2 week(s).    Specialty: Neurology  Why: 0845 am  Contact information:  [address]  [place] [address]  [phone]             [facility] Follow up.    Why: office to coantact pt to schedule follow up (email request sent to NR)  Contact information:  [place] [address]  [phone]                       Patient Instructions:      Ambulatory referral/consult to Gastroenterology   Standing Status: Future   Referral Priority: Routine Referral Type: Consultation   Referral Reason: Specialty Services Required   Requested Specialty: Gastroenterology   Number of Visits Requested: 1     Diet Adult Regular     Notify your health care provider if you experience any of the following:  temperature >100.4     Notify your health care provider if you experience any of the following:  redness, tenderness, or signs of infection (pain, swelling, redness, odor or green/yellow discharge around incision site)     Activity as tolerated       Significant Diagnostic Studies: Labs:   BMP:   Recent Labs   Lab 04/03/23  0518 04/04/23  0503   * 193*    140   K 4.0 4.0    113*   CO2 22* 23   BUN 9 9   CREATININE 0.5 0.5   CALCIUM 9.4 8.4*   MG 2.2 1.8   , CMP   Recent Labs   Lab 04/03/23  0518 04/04/23  0503    140   K 4.0 4.0    113*   CO2 22* 23   * 193*   BUN 9 9   CREATININE 0.5 0.5   CALCIUM 9.4 8.4*   PROT 8.5*  --    ALBUMIN 4.2  --    BILITOT 1.8*  --    ALKPHOS 85  --    AST 31  --    ALT 23  --    ANIONGAP 10  4*   , CBC   Recent Labs   Lab 04/03/23  0518 04/04/23  0503   WBC 11.28 15.39*   HGB 8.0* 7.1*   HCT 24.3* 21.6*    375   , INR No results found for: INR, PROTIME, Lipid Panel No results found for: CHOL, HDL, LDLCALC, TRIG, CHOLHDL, Troponin No results for input(s): TROPONINI in the last 168 hours. and A1C: No results for input(s): HGBA1C in the last 4320 hours.    Pending Diagnostic Studies:     None       X-Ray Abdomen AP 1 View    Result Date: 4/2/2023  KUB Clinical history is abdominal pain There is a mild amount of fecal material in the right colon suggestive of constipation. There are no dilated bowel loops. No organomegaly or abnormal soft tissue mass. There are no osseous or normalities. IMPRESSION: Mild amount of fecal material in the right colon suggestive of constipation No evidence of obstruction Electronically signed by:  Coby Hernandez MD  4/2/2023 9:52 AM CDT Workstation: [id number]    CT Head Without Contrast    Result Date: 4/1/2023  Exam: Unenhanced CT scan of brain INDICATION: Headache TECHNIQUE: Axial images in 5 mm intervals were obtained through the brain without the administration of intravenous contrast material. CMS MANDATED QUALITY DATA - CT RADIATION  436 All CT scans at this facility utilize dose modulation, iterative reconstruction, and/or weight based dosing when appropriate to reduce radiation dose to as low as reasonably achievable. FINDINGS: The gray-white matter demonstrates an unremarkable appearance. There is no evidence of an intracranial hemorrhage. There is no evidence of an intracranial mass. Ventricular system is normal. Basal cisterns are maintained. Posterior fossa is unremarkable. Orbits are symmetric. Calvarium demonstrates an unremarkable appearance. IMPRESSION: Normal exam. Electronically signed by:  Tomer Willams MD  4/1/2023 6:12 AM CDT Workstation: [id number]    MRI Brain Without Contrast    Result Date: 4/2/2023  MRI of the brain Clinical history is  headache Multiplanar images of the brain were obtained. Comparison is made to a prior head CT dated 4/1/2023 FINDINGS: The ventricles and sulci are normal in size and configuration for age. There is no hemorrhage, mass or midline shift. There are no extra-axial fluid collections. There is normal signal within the white matter on all sequences. There is no abnormality on the diffusion-weighted images to suggest acute infarct. There are flow voids within the cavernous portions of the internal carotid arteries and basilar artery indicating patency. The corpus callosum, cerebellar tonsils, midline structures and orbits are normal. The paranasal sinuses and mastoid air cells are clear. IMPRESSION: Negative MRI of the brain Electronically signed by:  Coby Hernandez MD  4/2/2023 11:08 AM CDT Workstation: [id number]    X-Ray Chest AP Portable    Result Date: 4/1/2023  CLINICAL HISTORY: 47 years (1975) Female chest pain Chest pain TECHNIQUE: Portable AP radiograph the chest. COMPARISON: Radiograph November 24, 2022. FINDINGS: Mildly increased central hilar interstitial opacities are seen bilaterally suggestive of either mild edema, atypical infection/pneumonia or bronchitis in the appropriate clinical setting. There is blunting of both costophrenic angles consistent with trace pleural effusions and adjacent atelectasis. No pneumothorax is identified. The heart is mildly enlarged.  Osseous structures show degenerative changes in the spine. The visualized upper abdomen is unremarkable. IMPRESSION: Findings compatible with mild pulmonary vascular congestion/interstitial pulmonary edema as above. . Electronically signed by:  Marin Mckeon MD  4/1/2023 8:40 AM CDT Workstation: [id number]    US Lower Extremity Veins Right    Result Date: 4/1/2023  Exam: Right lower extremity venous Doppler INDICATION: Right lower extremity pain FINDINGS: On grayscale imaging there is compressibility of the common femoral, femoral,  and popliteal veins. Color-flow is noted in all venous segments including the tibial veins. Waveforms are normal. IMPRESSION: No evidence of deep vein thrombosis involving the right lower extremity. Electronically signed by:  Tomer Willams MD  4/1/2023 7:13 AM CDT Workstation: [id number]    CTA Head and Neck (xpd)    Result Date: 4/2/2023  EXAMINATION: CTA HEAD AND NECK (XPD) CLINICAL INDICATION: Female, 47 years old. Dizziness, persistent/recurrent, cardiac or vascular cause suspected TECHNIQUE: Axial CT angiogram of the head and neck was performed with contrast. Multiplanar reformations as well as 3-D volume rendered imaging were reviewed at the workstation. Degree of stenosis was measured utilizing the NASCET method. CONTRAST: 100 mL mL of Omnipaque 350 IV. COMPARISON: Head CT dated 4/1/2023 FINDINGS: CT of the brain: The distal vertebral arteries, basilar artery and cavernous portions of the internal carotid arteries are patent. The anterior cerebral arteries, middle cerebral arteries and posterior cerebral arteries are patent without large vessel occlusion, stenosis, aneurysm or AVM Aortic Arch and Great Vessel Origins: Widely patent. Subclavian Arteries: Widely patent. Right Common Carotid Artery: Widely patent. Right Internal Carotid Artery: Widely patent. Right External Carotid Artery: Widely patent. Left Common Carotid Artery: Widely patent. Left Internal Carotid Artery: Widely patent. Left External Carotid Artery: Widely patent. Right Vertebral Artery: Widely patent. Left Vertebral Artery: Widely patent. The paraspinous soft tissues are normal. There are no acute osseous abnormalities. The lung apices are clear. IMPRESSION: Negative CTA of the head and neck. This exam was performed according to our departmental dose-optimization program which includes automated exposure control, adjustment of the mA and/or kV according to patient size and/or use of iterative reconstruction technique. Electronically signed  by:  Coby Hernandez MD  4/2/2023 11:13 AM CDT Workstation: [id number]  Medications:  Reconciled Home Medications:      Medication List      CHANGE how you take these medications    topiramate 100 MG tablet  Commonly known as: TOPAMAX  Take 1 tablet (100 mg total) by mouth 2 (two) times daily.  What changed:   · medication strength  · how much to take        CONTINUE taking these medications    ALPRAZolam 1 MG tablet  Commonly known as: XANAX  Take 1 mg by mouth nightly as needed.     cyclobenzaprine 10 MG tablet  Commonly known as: FLEXERIL  Take 1 tablet by mouth 2 (two) times daily as needed.     folic acid 1 MG tablet  Commonly known as: FOLVITE  Take 1 tablet (1 mg total) by mouth once daily.     hydroxyurea 500 mg Cap  Commonly known as: HYDREA  Take 1,000 mg by mouth once daily.     morphine 30 MG 12 hr tablet  Commonly known as: MS CONTIN  Take 30 mg by mouth 2 (two) times daily as needed.     ONE DAILY MULTIVITAMIN per tablet  Generic drug: multivitamin  Take 1 tablet by mouth once daily.     oxyCODONE 15 MG Tab  Commonly known as: ROXICODONE  Take 15 mg by mouth every 4 (four) hours as needed.            Indwelling Lines/Drains at time of discharge:   Lines/Drains/Airways     None                 Time spent on the discharge of patient: 32 minutes         Deja Fam MD  Department of Hospital Medicine  [facility]

## 2023-04-04 NOTE — PLAN OF CARE
04/04/23 1135   Final Note   Assessment Type Final Discharge Note   Anticipated Discharge Disposition Home   Hospital Resources/Appts/Education Provided Appointments scheduled and added to AVS   Post-Acute Status   Post-Acute Authorization Other   Other Status No Post-Acute Service Needs   Discharge Delays None known at this time     Patient cleared for discharge from case management standpoint.    Follow up appointments scheduled and added to AVS.    Chart and discharge orders reviewed.  Patient discharged home with no further case management needs.

## 2023-04-04 NOTE — PLAN OF CARE
Problem: Adult Inpatient Plan of Care  Goal: Plan of Care Review  Outcome: Ongoing, Progressing  Goal: Readiness for Transition of Care  Outcome: Ongoing, Progressing     Problem: Fluid Imbalance (Pneumonia)  Goal: Fluid Balance  Outcome: Ongoing, Progressing     Problem: Infection (Pneumonia)  Goal: Resolution of Infection Signs and Symptoms  Outcome: Ongoing, Progressing

## 2023-04-04 NOTE — CARE UPDATE
04/04/23 0817   PRE-TX-O2   Device (Oxygen Therapy) room air   $ Is the patient on Low Flow Oxygen? Yes   SpO2 97 %   Pulse Oximetry Type Intermittent   $ Pulse Oximetry - Multiple Charge Pulse Oximetry - Multiple   Pulse 100   Resp 18   Respiratory Evaluation   $ Care Plan Tech Time 15 min

## 2023-07-29 ENCOUNTER — HOSPITAL ENCOUNTER (EMERGENCY)
Facility: HOSPITAL | Age: 48
Discharge: HOME | End: 2023-07-29
Attending: EMERGENCY MEDICINE
Payer: MEDICAID

## 2023-07-29 VITALS
HEART RATE: 79 BPM | RESPIRATION RATE: 18 BRPM | WEIGHT: 180 LBS | TEMPERATURE: 98 F | OXYGEN SATURATION: 100 % | BODY MASS INDEX: 33.13 KG/M2 | DIASTOLIC BLOOD PRESSURE: 62 MMHG | HEIGHT: 62 IN | SYSTOLIC BLOOD PRESSURE: 127 MMHG

## 2023-07-29 DIAGNOSIS — D57.00 SICKLE CELL PAIN CRISIS: Primary | ICD-10-CM

## 2023-07-29 LAB
ALBUMIN SERPL BCP-MCNC: 4.2 G/DL (ref 3.5–5.2)
ALP SERPL-CCNC: 75 U/L (ref 55–135)
ALT SERPL W/O P-5'-P-CCNC: 16 U/L (ref 10–44)
ANION GAP SERPL CALC-SCNC: 8 MMOL/L (ref 8–16)
AST SERPL-CCNC: 31 U/L (ref 10–40)
B-HCG UR QL: NEGATIVE
BASOPHILS # BLD AUTO: 0.05 K/UL (ref 0–0.2)
BASOPHILS NFR BLD: 0.4 % (ref 0–1.9)
BILIRUB SERPL-MCNC: 2.1 MG/DL (ref 0.1–1)
BNP SERPL-MCNC: 26 PG/ML (ref 0–99)
BUN SERPL-MCNC: 5 MG/DL (ref 6–20)
CALCIUM SERPL-MCNC: 9 MG/DL (ref 8.7–10.5)
CHLORIDE SERPL-SCNC: 106 MMOL/L (ref 95–110)
CO2 SERPL-SCNC: 25 MMOL/L (ref 23–29)
CREAT SERPL-MCNC: 0.7 MG/DL (ref 0.5–1.4)
CTP QC/QA: YES
DIFFERENTIAL METHOD: ABNORMAL
EOSINOPHIL # BLD AUTO: 0.2 K/UL (ref 0–0.5)
EOSINOPHIL NFR BLD: 1.7 % (ref 0–8)
ERYTHROCYTE [DISTWIDTH] IN BLOOD BY AUTOMATED COUNT: 17.5 % (ref 11.5–14.5)
EST. GFR  (NO RACE VARIABLE): >60 ML/MIN/1.73 M^2
GLUCOSE SERPL-MCNC: 152 MG/DL (ref 70–110)
HCT VFR BLD AUTO: 23.2 % (ref 37–48.5)
HGB BLD-MCNC: 7.8 G/DL (ref 12–16)
IMM GRANULOCYTES # BLD AUTO: 0.07 K/UL (ref 0–0.04)
IMM GRANULOCYTES NFR BLD AUTO: 0.5 % (ref 0–0.5)
INR PPP: 1 (ref 0.8–1.2)
LYMPHOCYTES # BLD AUTO: 6.5 K/UL (ref 1–4.8)
LYMPHOCYTES NFR BLD: 46.8 % (ref 18–48)
MCH RBC QN AUTO: 31.5 PG (ref 27–31)
MCHC RBC AUTO-ENTMCNC: 33.6 G/DL (ref 32–36)
MCV RBC AUTO: 94 FL (ref 82–98)
MONOCYTES # BLD AUTO: 1.7 K/UL (ref 0.3–1)
MONOCYTES NFR BLD: 11.9 % (ref 4–15)
NEUTROPHILS # BLD AUTO: 5.4 K/UL (ref 1.8–7.7)
NEUTROPHILS NFR BLD: 38.7 % (ref 38–73)
NRBC BLD-RTO: 1 /100 WBC
PLATELET # BLD AUTO: 450 K/UL (ref 150–450)
PMV BLD AUTO: 10 FL (ref 9.2–12.9)
POTASSIUM SERPL-SCNC: 3.4 MMOL/L (ref 3.5–5.1)
PROT SERPL-MCNC: 8.4 G/DL (ref 6–8.4)
PROTHROMBIN TIME: 11.3 SEC (ref 9–12.5)
RBC # BLD AUTO: 2.48 M/UL (ref 4–5.4)
SODIUM SERPL-SCNC: 139 MMOL/L (ref 136–145)
TROPONIN I SERPL HS-MCNC: 13 PG/ML (ref 0–14.9)
TROPONIN I SERPL HS-MCNC: 13.5 PG/ML (ref 0–14.9)
WBC # BLD AUTO: 13.93 K/UL (ref 3.9–12.7)

## 2023-07-29 PROCEDURE — 84484 ASSAY OF TROPONIN QUANT: CPT | Mod: 91 | Performed by: EMERGENCY MEDICINE

## 2023-07-29 PROCEDURE — 80053 COMPREHEN METABOLIC PANEL: CPT | Performed by: EMERGENCY MEDICINE

## 2023-07-29 PROCEDURE — 99285 EMERGENCY DEPT VISIT HI MDM: CPT | Mod: 25

## 2023-07-29 PROCEDURE — 83880 ASSAY OF NATRIURETIC PEPTIDE: CPT | Performed by: EMERGENCY MEDICINE

## 2023-07-29 PROCEDURE — 85025 COMPLETE CBC W/AUTO DIFF WBC: CPT | Performed by: EMERGENCY MEDICINE

## 2023-07-29 PROCEDURE — 25500020 PHARM REV CODE 255: Performed by: EMERGENCY MEDICINE

## 2023-07-29 PROCEDURE — 81025 URINE PREGNANCY TEST: CPT | Performed by: EMERGENCY MEDICINE

## 2023-07-29 PROCEDURE — 25000003 PHARM REV CODE 250: Performed by: STUDENT IN AN ORGANIZED HEALTH CARE EDUCATION/TRAINING PROGRAM

## 2023-07-29 PROCEDURE — 93010 ELECTROCARDIOGRAM REPORT: CPT | Mod: ,,, | Performed by: INTERNAL MEDICINE

## 2023-07-29 PROCEDURE — 63600175 PHARM REV CODE 636 W HCPCS: Performed by: STUDENT IN AN ORGANIZED HEALTH CARE EDUCATION/TRAINING PROGRAM

## 2023-07-29 PROCEDURE — 93010 EKG 12-LEAD: ICD-10-PCS | Mod: ,,, | Performed by: INTERNAL MEDICINE

## 2023-07-29 PROCEDURE — 93005 ELECTROCARDIOGRAM TRACING: CPT | Performed by: INTERNAL MEDICINE

## 2023-07-29 PROCEDURE — 85610 PROTHROMBIN TIME: CPT | Performed by: EMERGENCY MEDICINE

## 2023-07-29 PROCEDURE — 63600175 PHARM REV CODE 636 W HCPCS: Performed by: EMERGENCY MEDICINE

## 2023-07-29 PROCEDURE — 96365 THER/PROPH/DIAG IV INF INIT: CPT

## 2023-07-29 PROCEDURE — 96361 HYDRATE IV INFUSION ADD-ON: CPT

## 2023-07-29 PROCEDURE — 96376 TX/PRO/DX INJ SAME DRUG ADON: CPT | Mod: 59

## 2023-07-29 PROCEDURE — 25000003 PHARM REV CODE 250: Performed by: EMERGENCY MEDICINE

## 2023-07-29 PROCEDURE — 96375 TX/PRO/DX INJ NEW DRUG ADDON: CPT | Mod: 59

## 2023-07-29 RX ORDER — HYDROMORPHONE HYDROCHLORIDE 1 MG/ML
2 INJECTION, SOLUTION INTRAMUSCULAR; INTRAVENOUS; SUBCUTANEOUS
Status: COMPLETED | OUTPATIENT
Start: 2023-07-29 | End: 2023-07-29

## 2023-07-29 RX ORDER — IODIXANOL 320 MG/ML
100 INJECTION, SOLUTION INTRAVASCULAR
Status: COMPLETED | OUTPATIENT
Start: 2023-07-29 | End: 2023-07-29

## 2023-07-29 RX ORDER — HYDROMORPHONE HYDROCHLORIDE 1 MG/ML
0.5 INJECTION, SOLUTION INTRAMUSCULAR; INTRAVENOUS; SUBCUTANEOUS
Status: COMPLETED | OUTPATIENT
Start: 2023-07-29 | End: 2023-07-29

## 2023-07-29 RX ADMIN — PROMETHAZINE HYDROCHLORIDE 12.5 MG: 25 INJECTION INTRAMUSCULAR; INTRAVENOUS at 04:07

## 2023-07-29 RX ADMIN — SODIUM CHLORIDE, SODIUM LACTATE, POTASSIUM CHLORIDE, AND CALCIUM CHLORIDE 1000 ML: .6; .31; .03; .02 INJECTION, SOLUTION INTRAVENOUS at 09:07

## 2023-07-29 RX ADMIN — PROMETHAZINE HYDROCHLORIDE 12.5 MG: 25 INJECTION INTRAMUSCULAR; INTRAVENOUS at 09:07

## 2023-07-29 RX ADMIN — HYDROMORPHONE HYDROCHLORIDE 0.5 MG: 0.5 INJECTION, SOLUTION INTRAMUSCULAR; INTRAVENOUS; SUBCUTANEOUS at 09:07

## 2023-07-29 RX ADMIN — HYDROMORPHONE HYDROCHLORIDE 2 MG: 1 INJECTION, SOLUTION INTRAMUSCULAR; INTRAVENOUS; SUBCUTANEOUS at 04:07

## 2023-07-29 RX ADMIN — IODIXANOL 100 ML: 320 INJECTION, SOLUTION INTRAVASCULAR at 05:07

## 2023-07-29 NOTE — DISCHARGE INSTRUCTIONS
Follow-up with your primary care provider next week.  Return to emergency department if problems persist worsens or additional concerns.

## 2023-07-29 NOTE — ED PROVIDER NOTES
Encounter Date: 7/29/2023       History     Chief Complaint   Patient presents with    Chest Pain     Chest pain and sob starting at 2300 last night, hx of sickle cell     HPI  Sunita Mendez is a 47 y.o. F PMHX sickle cell disease who presents to the ED with midsternal chest pain and shortness of breath that started early this morning as well as hip and leg pain that are her baseline pain due to her sickle cell.  Patient states she was resting in her bed when she felt sudden midsternal chest pain with palpitations as well as shortness of breath.  She is had chest pain in the past before but this was worse than usual.  She had an pulse ox on her nightstand and she said that when she took her pulse ox it was only 88%.  She denies nausea, vomiting, abdominal pain, lightheadedness and dizziness.  She has been taking her medications as prescribed.    Review of patient's allergies indicates:  No Known Allergies  Past Medical History:   Diagnosis Date    Anticoagulant long-term use      No past surgical history on file.  No family history on file.  Social History     Tobacco Use    Smoking status: Never    Smokeless tobacco: Never   Substance Use Topics    Alcohol use: Not Currently    Drug use: Not Currently     Review of Systems   Constitutional:  Negative for fever.   HENT:  Negative for sore throat.    Respiratory:  Positive for shortness of breath.    Cardiovascular:  Positive for chest pain.   Gastrointestinal:  Negative for abdominal pain and nausea.   Genitourinary:  Negative for dysuria.   Musculoskeletal:  Positive for joint swelling and myalgias. Negative for back pain.   Skin:  Negative for rash.   Neurological:  Negative for weakness.   Hematological:  Does not bruise/bleed easily.       Physical Exam     Initial Vitals [07/29/23 0212]   BP Pulse Resp Temp SpO2   132/70 (!) 115 20 98.9 °F (37.2 °C) 100 %      MAP       --         Physical Exam    Nursing note and vitals reviewed.  Constitutional: She appears  well-developed and well-nourished. She is not diaphoretic.   Eyes: EOM are normal. Pupils are equal, round, and reactive to light.   Neck: Neck supple.   Normal range of motion.  Cardiovascular:  Intact distal pulses.   Tachycardia present.   Exam reveals no gallop and no friction rub.       No murmur heard.  Pulmonary/Chest: No respiratory distress. She has no wheezes. She has no rhonchi. She has no rales.   Abdominal: Abdomen is soft. She exhibits no distension. There is no abdominal tenderness. There is no rebound and no guarding.   Musculoskeletal:      Cervical back: Normal range of motion and neck supple.           ED Course   Procedures  Labs Reviewed   CBC W/ AUTO DIFFERENTIAL - Abnormal; Notable for the following components:       Result Value    WBC 13.93 (*)     RBC 2.48 (*)     Hemoglobin 7.8 (*)     Hematocrit 23.2 (*)     MCH 31.5 (*)     RDW 17.5 (*)     Immature Grans (Abs) 0.07 (*)     Lymph # 6.5 (*)     Mono # 1.7 (*)     nRBC 1 (*)     All other components within normal limits   COMPREHENSIVE METABOLIC PANEL - Abnormal; Notable for the following components:    Potassium 3.4 (*)     Glucose 152 (*)     BUN 5 (*)     Total Bilirubin 2.1 (*)     All other components within normal limits   B-TYPE NATRIURETIC PEPTIDE   TROPONIN I HIGH SENSITIVITY   PROTIME-INR   TROPONIN I HIGH SENSITIVITY   POCT URINE PREGNANCY        ECG Results              EKG 12-lead (In process)  Result time 07/29/23 05:02:07      In process by Interface, Lab In [facility] (07/29/23 05:02:07)                   Narrative:    Test Reason : R07.9,    Vent. Rate : 112 BPM     Atrial Rate : 112 BPM     P-R Int : 132 ms          QRS Dur : 080 ms      QT Int : 324 ms       P-R-T Axes : 043 007 262 degrees     QTc Int : 442 ms    Sinus tachycardia  Voltage criteria for left ventricular hypertrophy  Possible Inferior infarct ,age undetermined  Cannot rule out Anterior infarct ,age undetermined  Abnormal ECG  When compared with ECG of  01-APR-2023 03:36,  Minimal criteria for Anterior infarct are now Present  Borderline criteria for Inferior infarct are now Present  Inverted T waves have replaced nonspecific T wave abnormality in Inferior  leads    Referred By: [email or web address]   SELF           Confirmed By:                                   Imaging Results              CTA Chest Non-Coronary (PE Studies) (In process)  Result time 07/29/23 04:42:57                     X-Ray Chest AP Portable (In process)                      Medications   HYDROmorphone injection 2 mg (2 mg Intravenous Given 7/29/23 0422)   promethazine (PHENERGAN) 12.5 mg in dextrose 5 % (D5W) 50 mL IVPB (0 mg Intravenous Stopped 7/29/23 0448)   iodixanoL (VISIPAQUE 320) injection 100 mL (100 mLs Intravenous Given 7/29/23 2418)     Medical Decision Making:   Initial Assessment:   Sunita Mendez is a 47 y.o. F PMHX sickle cell disease who presents to the ED with midsternal chest pain and shortness of breath that started early this morning as well as hip and leg pain that are her baseline pain due to her sickle cell.  Vitals upon arrival with mild hypertension systolic 132, tachycardia 115 otherwise normal.  Physical exam significant for comfortable-appearing woman sitting upright in a stretcher in no acute respiratory distress speaking in full sentences.  Cardiac exam with tachycardia to the low 100s otherwise no murmurs rubs or gallops.  Lungs clear to auscultation bilaterally.  No abdominal tenderness to palpation.  No lower extremity edema.  Remainder of exam within normal limits.  Differential Diagnosis:   Differential diagnosis includes but is not limited to ACS, AAA, PE, PTX, pneumonia, anemia, hypoglycemia, acute chest syndrome, sickle cell pain crisis  ED Management:  Patient is having urine baseline sickle cell pain but additionally had this episode of shortness of breath with midsternal chest pain.  She remains tachycardic in the low 100s with clear breath sounds but states chest  pain has resolved.  Can not rule out PE in his patient is a will obtain CT PE for further evaluation.  We will give her Dilaudid and Phenergan for her pain control.  CBC with a baseline microcytic anemia and leukocytosis of 13.  Troponin is negative and EKG also within normal limits.  If patient's pain well controlled and CT PE within normal limits the patient may be able to be discharged home with PCP follow-up.  Please see ED course for updates.      Margarito Muro MD  [employer] Emergency Medicine PGY-3  7/29/2023 4:30 AM                 ED Course as of 07/29/23 0547   Sat Jul 29, 2023   0358 POCT urine pregnancy  Negative not pregnant [MB]   0358 Troponin I High Sensitivity #1  WNL [MB]   0358 Comprehensive metabolic panel(!)  Mild hypokalemia at 3.4, hyperglycemia 152 elevated T bili at 2.1 [MB]   0359 Brain natriuretic peptide  Within normal limits CHF lower on differential [MB]   0359 Protime-INR  Within normal limits [MB]   0359 CBC auto differential(!)  Leukocytosis 13.9, microcytic anemia hemoglobin 7.8 [MB]   0546 Patient's pain is improved.  CT pending final read however within normal limits the patient likely okay for discharge home with PCP follow-up. [MB]      ED Course User Index  [MB] Margarito Muro MD                 Clinical Impression:   Final diagnoses:  [R07.9] Chest pain               Margarito Muro MD  Resident  07/29/23 0547

## 2023-12-23 ENCOUNTER — HOSPITAL ENCOUNTER (EMERGENCY)
Facility: HOSPITAL | Age: 48
Discharge: HOME | End: 2023-12-24
Attending: EMERGENCY MEDICINE
Payer: MEDICAID

## 2023-12-23 DIAGNOSIS — D57.00 SICKLE CELL ANEMIA WITH PAIN: ICD-10-CM

## 2023-12-23 DIAGNOSIS — G43.909 MIGRAINE WITHOUT STATUS MIGRAINOSUS, NOT INTRACTABLE, UNSPECIFIED MIGRAINE TYPE: Primary | ICD-10-CM

## 2023-12-23 DIAGNOSIS — G43.909 MIGRAINE: ICD-10-CM

## 2023-12-23 DIAGNOSIS — D57.1 SICKLE CELL DISEASE: ICD-10-CM

## 2023-12-23 LAB
ALBUMIN SERPL BCP-MCNC: 4.4 G/DL (ref 3.5–5.2)
ALP SERPL-CCNC: 61 U/L (ref 55–135)
ALT SERPL W/O P-5'-P-CCNC: 12 U/L (ref 10–44)
ANION GAP SERPL CALC-SCNC: 4 MMOL/L (ref 8–16)
AST SERPL-CCNC: 29 U/L (ref 10–40)
BASOPHILS # BLD AUTO: 0.04 K/UL (ref 0–0.2)
BASOPHILS NFR BLD: 0.5 % (ref 0–1.9)
BILIRUB SERPL-MCNC: 2.7 MG/DL (ref 0.1–1)
BUN SERPL-MCNC: 5 MG/DL (ref 6–20)
CALCIUM SERPL-MCNC: 9.4 MG/DL (ref 8.7–10.5)
CHLORIDE SERPL-SCNC: 105 MMOL/L (ref 95–110)
CO2 SERPL-SCNC: 27 MMOL/L (ref 23–29)
CREAT SERPL-MCNC: 0.5 MG/DL (ref 0.5–1.4)
DIFFERENTIAL METHOD BLD: ABNORMAL
EOSINOPHIL # BLD AUTO: 0.1 K/UL (ref 0–0.5)
EOSINOPHIL NFR BLD: 0.6 % (ref 0–8)
ERYTHROCYTE [DISTWIDTH] IN BLOOD BY AUTOMATED COUNT: 17.5 % (ref 11.5–14.5)
EST. GFR  (NO RACE VARIABLE): >60 ML/MIN/1.73 M^2
GLUCOSE SERPL-MCNC: 86 MG/DL (ref 70–110)
HCT VFR BLD AUTO: 25.8 % (ref 37–48.5)
HGB BLD-MCNC: 8.9 G/DL (ref 12–16)
IMM GRANULOCYTES # BLD AUTO: 0.04 K/UL (ref 0–0.04)
IMM GRANULOCYTES NFR BLD AUTO: 0.5 % (ref 0–0.5)
INFLUENZA A, MOLECULAR: NEGATIVE
INFLUENZA B, MOLECULAR: NEGATIVE
LYMPHOCYTES # BLD AUTO: 3.8 K/UL (ref 1–4.8)
LYMPHOCYTES NFR BLD: 43.5 % (ref 18–48)
MAGNESIUM SERPL-MCNC: 1.9 MG/DL (ref 1.6–2.6)
MCH RBC QN AUTO: 32.5 PG (ref 27–31)
MCHC RBC AUTO-ENTMCNC: 34.5 G/DL (ref 32–36)
MCV RBC AUTO: 94 FL (ref 82–98)
MONOCYTES # BLD AUTO: 1.1 K/UL (ref 0.3–1)
MONOCYTES NFR BLD: 12.8 % (ref 4–15)
NEUTROPHILS # BLD AUTO: 3.7 K/UL (ref 1.8–7.7)
NEUTROPHILS NFR BLD: 42.1 % (ref 38–73)
NRBC BLD-RTO: 2 /100 WBC
PLATELET # BLD AUTO: 404 K/UL (ref 150–450)
PMV BLD AUTO: 10 FL (ref 9.2–12.9)
POTASSIUM SERPL-SCNC: 3.9 MMOL/L (ref 3.5–5.1)
PROT SERPL-MCNC: 8.3 G/DL (ref 6–8.4)
RBC # BLD AUTO: 2.74 M/UL (ref 4–5.4)
RETICS/RBC NFR AUTO: 7.6 % (ref 0.5–2.5)
SARS-COV-2 RDRP RESP QL NAA+PROBE: NEGATIVE
SODIUM SERPL-SCNC: 136 MMOL/L (ref 136–145)
SPECIMEN SOURCE: NORMAL
WBC # BLD AUTO: 8.65 K/UL (ref 3.9–12.7)

## 2023-12-23 PROCEDURE — U0002 COVID-19 LAB TEST NON-CDC: HCPCS | Performed by: STUDENT IN AN ORGANIZED HEALTH CARE EDUCATION/TRAINING PROGRAM

## 2023-12-23 PROCEDURE — 96376 TX/PRO/DX INJ SAME DRUG ADON: CPT

## 2023-12-23 PROCEDURE — 83735 ASSAY OF MAGNESIUM: CPT | Performed by: STUDENT IN AN ORGANIZED HEALTH CARE EDUCATION/TRAINING PROGRAM

## 2023-12-23 PROCEDURE — 96361 HYDRATE IV INFUSION ADD-ON: CPT

## 2023-12-23 PROCEDURE — 63600175 PHARM REV CODE 636 W HCPCS: Performed by: STUDENT IN AN ORGANIZED HEALTH CARE EDUCATION/TRAINING PROGRAM

## 2023-12-23 PROCEDURE — 96375 TX/PRO/DX INJ NEW DRUG ADDON: CPT

## 2023-12-23 PROCEDURE — 25000003 PHARM REV CODE 250: Performed by: STUDENT IN AN ORGANIZED HEALTH CARE EDUCATION/TRAINING PROGRAM

## 2023-12-23 PROCEDURE — 80053 COMPREHEN METABOLIC PANEL: CPT | Performed by: STUDENT IN AN ORGANIZED HEALTH CARE EDUCATION/TRAINING PROGRAM

## 2023-12-23 PROCEDURE — 99285 EMERGENCY DEPT VISIT HI MDM: CPT | Mod: 25

## 2023-12-23 PROCEDURE — 63600175 PHARM REV CODE 636 W HCPCS: Performed by: EMERGENCY MEDICINE

## 2023-12-23 PROCEDURE — 93005 ELECTROCARDIOGRAM TRACING: CPT | Performed by: INTERNAL MEDICINE

## 2023-12-23 PROCEDURE — 93010 ELECTROCARDIOGRAM REPORT: CPT | Mod: ,,, | Performed by: INTERNAL MEDICINE

## 2023-12-23 PROCEDURE — 87502 INFLUENZA DNA AMP PROBE: CPT | Performed by: STUDENT IN AN ORGANIZED HEALTH CARE EDUCATION/TRAINING PROGRAM

## 2023-12-23 PROCEDURE — 85045 AUTOMATED RETICULOCYTE COUNT: CPT | Performed by: STUDENT IN AN ORGANIZED HEALTH CARE EDUCATION/TRAINING PROGRAM

## 2023-12-23 PROCEDURE — 85025 COMPLETE CBC W/AUTO DIFF WBC: CPT | Performed by: STUDENT IN AN ORGANIZED HEALTH CARE EDUCATION/TRAINING PROGRAM

## 2023-12-23 PROCEDURE — 96365 THER/PROPH/DIAG IV INF INIT: CPT

## 2023-12-23 RX ORDER — HYDROMORPHONE HYDROCHLORIDE 1 MG/ML
2 INJECTION, SOLUTION INTRAMUSCULAR; INTRAVENOUS; SUBCUTANEOUS
Status: COMPLETED | OUTPATIENT
Start: 2023-12-23 | End: 2023-12-23

## 2023-12-23 RX ORDER — MAGNESIUM SULFATE HEPTAHYDRATE 40 MG/ML
2 INJECTION, SOLUTION INTRAVENOUS ONCE
Status: COMPLETED | OUTPATIENT
Start: 2023-12-23 | End: 2023-12-24

## 2023-12-23 RX ORDER — METOCLOPRAMIDE HYDROCHLORIDE 5 MG/ML
10 INJECTION INTRAMUSCULAR; INTRAVENOUS
Status: COMPLETED | OUTPATIENT
Start: 2023-12-23 | End: 2023-12-23

## 2023-12-23 RX ORDER — MAGNESIUM SULFATE HEPTAHYDRATE 40 MG/ML
2 INJECTION, SOLUTION INTRAVENOUS ONCE
Status: DISCONTINUED | OUTPATIENT
Start: 2023-12-24 | End: 2023-12-23

## 2023-12-23 RX ORDER — MAGNESIUM SULFATE HEPTAHYDRATE 40 MG/ML
INJECTION, SOLUTION INTRAVENOUS
Status: DISCONTINUED
Start: 2023-12-23 | End: 2023-12-24 | Stop reason: HOSPADM

## 2023-12-23 RX ORDER — HYDROMORPHONE HYDROCHLORIDE 1 MG/ML
1 INJECTION, SOLUTION INTRAMUSCULAR; INTRAVENOUS; SUBCUTANEOUS
Status: COMPLETED | OUTPATIENT
Start: 2023-12-23 | End: 2023-12-23

## 2023-12-23 RX ADMIN — MAGNESIUM SULFATE HEPTAHYDRATE 2 G: 40 INJECTION, SOLUTION INTRAVENOUS at 11:12

## 2023-12-23 RX ADMIN — SODIUM CHLORIDE 1000 ML: 0.9 INJECTION, SOLUTION INTRAVENOUS at 09:12

## 2023-12-23 RX ADMIN — METOCLOPRAMIDE 10 MG: 5 INJECTION, SOLUTION INTRAMUSCULAR; INTRAVENOUS at 09:12

## 2023-12-23 RX ADMIN — HYDROMORPHONE HYDROCHLORIDE 2 MG: 1 INJECTION, SOLUTION INTRAMUSCULAR; INTRAVENOUS; SUBCUTANEOUS at 11:12

## 2023-12-23 RX ADMIN — HYDROMORPHONE HYDROCHLORIDE 1 MG: 1 INJECTION, SOLUTION INTRAMUSCULAR; INTRAVENOUS; SUBCUTANEOUS at 09:12

## 2023-12-24 VITALS
OXYGEN SATURATION: 94 % | HEART RATE: 84 BPM | WEIGHT: 163 LBS | RESPIRATION RATE: 18 BRPM | DIASTOLIC BLOOD PRESSURE: 53 MMHG | TEMPERATURE: 99 F | BODY MASS INDEX: 29.81 KG/M2 | SYSTOLIC BLOOD PRESSURE: 97 MMHG

## 2023-12-24 LAB
BILIRUB UR QL STRIP: NEGATIVE
CLARITY UR: CLEAR
COLOR UR: YELLOW
GLUCOSE UR QL STRIP: NEGATIVE
HGB UR QL STRIP: NEGATIVE
KETONES UR QL STRIP: NEGATIVE
LEUKOCYTE ESTERASE UR QL STRIP: NEGATIVE
NITRITE UR QL STRIP: NEGATIVE
PH UR STRIP: 6 [PH] (ref 5–8)
PROT UR QL STRIP: ABNORMAL
SP GR UR STRIP: 1.01 (ref 1–1.03)
URN SPEC COLLECT METH UR: ABNORMAL
UROBILINOGEN UR STRIP-ACNC: ABNORMAL EU/DL

## 2023-12-24 PROCEDURE — 63600175 PHARM REV CODE 636 W HCPCS: Performed by: STUDENT IN AN ORGANIZED HEALTH CARE EDUCATION/TRAINING PROGRAM

## 2023-12-24 PROCEDURE — 81003 URINALYSIS AUTO W/O SCOPE: CPT | Performed by: STUDENT IN AN ORGANIZED HEALTH CARE EDUCATION/TRAINING PROGRAM

## 2023-12-24 RX ORDER — HYDROMORPHONE HYDROCHLORIDE 1 MG/ML
2 INJECTION, SOLUTION INTRAMUSCULAR; INTRAVENOUS; SUBCUTANEOUS
Status: COMPLETED | OUTPATIENT
Start: 2023-12-24 | End: 2023-12-24

## 2023-12-24 RX ORDER — KETOROLAC TROMETHAMINE 30 MG/ML
15 INJECTION, SOLUTION INTRAMUSCULAR; INTRAVENOUS
Status: COMPLETED | OUTPATIENT
Start: 2023-12-24 | End: 2023-12-24

## 2023-12-24 RX ADMIN — KETOROLAC TROMETHAMINE 15 MG: 30 INJECTION, SOLUTION INTRAMUSCULAR; INTRAVENOUS at 12:12

## 2023-12-24 RX ADMIN — HYDROMORPHONE HYDROCHLORIDE 2 MG: 1 INJECTION, SOLUTION INTRAMUSCULAR; INTRAVENOUS; SUBCUTANEOUS at 12:12

## 2023-12-24 NOTE — ED PROVIDER NOTES
Encounter Date: 12/23/2023       History     Chief Complaint   Patient presents with    Migraine    Generalized Body Aches     Reports joint pain for the past week      HPI  Sunita Mendez is a 48 y.o. F w/ PMHx sickle cell anemia, migraines presenting for 3 days of severe migraine symptoms and 1 week of intermittent sickle cell pain.  Due to insurance issues, currently out of migraine medications.  Endorsing throbbing unilateral headache, nausea, photophobia, phonophobia consistent with prior migraines.  Regarding her sickle cell pain, attempting home MS Contin 30mg q12hr, Roxicone 15mg q 4 hours without improvement. Pain in bilateral hips, knees, and hands. Denies chest pain, palpitations, shortness of breath, numbness, weakness, syncope.     Review of patient's allergies indicates:  No Known Allergies  Past Medical History:   Diagnosis Date    Anticoagulant long-term use      No past surgical history on file.  No family history on file.  Social History     Tobacco Use    Smoking status: Never    Smokeless tobacco: Never   Substance Use Topics    Alcohol use: Not Currently    Drug use: Not Currently     Review of Systems   Constitutional:  Negative for chills and fever.   HENT:  Negative for congestion and rhinorrhea.    Respiratory:  Negative for cough, shortness of breath and wheezing.    Cardiovascular:  Negative for chest pain and palpitations.   Gastrointestinal:  Positive for nausea. Negative for abdominal pain, constipation, diarrhea and vomiting.   Genitourinary:  Negative for dysuria and frequency.   Musculoskeletal:  Positive for arthralgias. Negative for back pain, joint swelling and neck pain.   Neurological:  Positive for headaches. Negative for syncope, facial asymmetry, speech difficulty, light-headedness and numbness.   Psychiatric/Behavioral:  Negative for agitation and confusion.        Physical Exam     Initial Vitals [12/23/23 2023]   BP Pulse Resp Temp SpO2   128/66 96 18 99.1 °F (37.3 °C) 96 %       MAP       --         Physical Exam    Nursing note and vitals reviewed.  Constitutional: She appears well-developed and well-nourished.   Uncomfortable appearing  Sitting in dark room with sunglasses   HENT:   Head: Normocephalic and atraumatic.   Right Ear: External ear normal.   Left Ear: External ear normal.   Mouth/Throat: Oropharynx is clear and moist.   Eyes: Conjunctivae and EOM are normal. Pupils are equal, round, and reactive to light. Right eye exhibits no discharge. Left eye exhibits no discharge.   +photosensitivity   Neck: Neck supple.   Normal range of motion.  Cardiovascular:  Normal rate, regular rhythm, normal heart sounds and intact distal pulses.           Pulmonary/Chest: Breath sounds normal. No respiratory distress. She has no wheezes. She has no rhonchi. She exhibits no tenderness.   Abdominal: Abdomen is soft. She exhibits no distension. There is no abdominal tenderness. There is no rebound and no guarding.   Musculoskeletal:         General: No edema. Normal range of motion.      Cervical back: Normal range of motion and neck supple.     Neurological: She is alert and oriented to person, place, and time.   Aox3  CN grossly intact  Sensation intact to light touch throughout  5/5 strength in all extremities  Heel-to-shin intact   Skin: Skin is warm and dry.         ED Course   Procedures  Labs Reviewed   CBC W/ AUTO DIFFERENTIAL - Abnormal; Notable for the following components:       Result Value    RBC 2.74 (*)     Hemoglobin 8.9 (*)     Hematocrit 25.8 (*)     MCH 32.5 (*)     RDW 17.5 (*)     Mono # 1.1 (*)     nRBC 2 (*)     All other components within normal limits   COMPREHENSIVE METABOLIC PANEL - Abnormal; Notable for the following components:    BUN 5 (*)     Total Bilirubin 2.7 (*)     Anion Gap 4 (*)     All other components within normal limits   URINALYSIS - Abnormal; Notable for the following components:    Protein, UA Trace (*)     Urobilinogen, UA 4.0-6.0 (*)     All other  components within normal limits    Narrative:     Collection Type->Urine, Clean Catch   RETICULOCYTES - Abnormal; Notable for the following components:    Retic 7.6 (*)     All other components within normal limits   INFLUENZA A AND B ANTIGEN    Narrative:     Specimen Source->Nasopharyngeal Swab   SARS-COV-2 RNA AMPLIFICATION, QUAL   MAGNESIUM          Imaging Results              X-Ray Chest AP Portable (In process)    Procedure changed from X-Ray Chest 1 View                    Medications   sodium chloride 0.9% bolus 1,000 mL 1,000 mL (0 mLs Intravenous Stopped 12/23/23 2259)   HYDROmorphone injection 1 mg (1 mg Intravenous Given 12/23/23 2159)   metoclopramide injection 10 mg (10 mg Intravenous Given 12/23/23 2159)   HYDROmorphone injection 2 mg (2 mg Intravenous Given 12/23/23 2318)   magnesium sulfate 2g in water 50mL IVPB (premix) (0 g Intravenous Stopped 12/24/23 0011)   HYDROmorphone injection 2 mg (2 mg Intravenous Given 12/24/23 0052)   ketorolac injection 15 mg (15 mg Intravenous Given 12/24/23 0052)     Medical Decision Making  Amount and/or Complexity of Data Reviewed  Labs: ordered.  Radiology: ordered.    Risk  Prescription drug management.      Sunita Mendez is a 48 y.o. female w/ PMHx migraines, sickle-cell presenting for 3 days of typical migraine symptoms (HA, nausea, photophobia, phonophobia) in 1 week of intermittent sickle cell pain not relieved by home pain medication. Vitals borderline temperature 99.1° F, otherwise vitals stable. Exam notable for uncomfortable overlying patient with photophobia, wearing sunglasses inside.  Lungs clear to auscultation.  Reassuring neuro exam as above.    Headache, nausea, photo/phonophobia consistent with history of prior migraines Low suspicion intracranial hemorrhage/ischemia given reassuring neuro exam as above, will defer advanced intracranial imaging at this time.  Differential includes but not limited to sickle cell pain crisis, COVID, flu, etc. Low  suspicion traumatic etiology for joint pain, denies recent trauma.  Lower suspicion bony infarction, reports pain consistent with prior pain flares, equal pain to bilateral extremities.    EKG (as interpreted by me):  NSR at 70 bpm, normal axis, intervals within normal limits, TWI/flattening in leads III and aVF seen on priors    Imaging:  CXR (per my interpretation):  Without acute findings    Labs:  No leukocytosis  Hemoglobin 8.9, pr from prior  Appropriate reticulocyte response  COVID/flu -  No acute electrolyte abnormalities, GALEN  UA noninfectious  Mag 1.9    ED Management:  Presentation consistent with migraine and sickle cell pain.  Given IV hydration, required multiple rounds of pain medication.  Received Reglan, Dilaudid (x3), Toradol, Mag.  After medications, endorses improvement in symptoms.  Feels her pain when I be manageable with home pain medications.  Blood pressure noted to be variable after administration of opioids. MAP remained >65 and mentation remained intact.  Patient is stable and discharged with strict return precautions.    Kojo Reece MD  Emergency Medicine PGY-3                Attending Attestation:   Physician Attestation Statement for Resident:  As the supervising MD   I agree with the above history.  -:   As the supervising MD I agree with the above PE.     As the supervising MD I agree with the above treatment, course, plan, and disposition.    I have reviewed and agree with the residents interpretation of the following: lab data and x-rays.                                        Clinical Impression:  Final diagnoses:  [G43.909] Migraine without status migrainosus, not intractable, unspecified migraine type (Primary)  [D57.00] Sickle cell anemia with pain          ED Disposition Condition    Discharge Stable          ED Prescriptions    None       Follow-up Information       Follow up With Specialties Details Why Contact Info Additional Information    Sebastián Chambers MD Internal  Medicine  Follow-up with your primary care doctor as needed [address] [place] [address] [address]  [facility] [address]  [phone]       [facility] - Emergency Dept Emergency Medicine  If symptoms worsen [address] [facility] [address]  [phone] 1st floor             Kojo Reece MD  Resident  12/24/23 0229       Rhys Terrazas MD  12/24/23 9364

## 2023-12-24 NOTE — DISCHARGE INSTRUCTIONS
You were seen in the Emergency Department for your migraine and sickle cell pain. You required pain medication and fluids. You tested negative for COVID and the Flu.     Return to the Emergency Department for chest pain, difficulty breathing, weakness, passing out, or any other new or concerning symptoms.

## 2024-03-23 ENCOUNTER — HOSPITAL ENCOUNTER (INPATIENT)
Facility: HOSPITAL | Age: 49
LOS: 4 days | Discharge: HOME | DRG: 812 | End: 2024-03-27
Attending: STUDENT IN AN ORGANIZED HEALTH CARE EDUCATION/TRAINING PROGRAM | Admitting: INTERNAL MEDICINE
Payer: MEDICAID

## 2024-03-23 DIAGNOSIS — D57.00 SICKLE CELL CRISIS: Primary | ICD-10-CM

## 2024-03-23 DIAGNOSIS — M25.559 HIP PAIN: ICD-10-CM

## 2024-03-23 DIAGNOSIS — R07.9 CHEST PAIN: ICD-10-CM

## 2024-03-23 LAB
ALBUMIN SERPL BCP-MCNC: 4.3 G/DL (ref 3.5–5.2)
ALP SERPL-CCNC: 70 U/L (ref 55–135)
ALT SERPL W/O P-5'-P-CCNC: 12 U/L (ref 10–44)
ANION GAP SERPL CALC-SCNC: 5 MMOL/L (ref 8–16)
ANISOCYTOSIS BLD QL SMEAR: ABNORMAL
AST SERPL-CCNC: 30 U/L (ref 10–40)
BASOPHILS # BLD AUTO: 0.05 K/UL (ref 0–0.2)
BASOPHILS NFR BLD: 0.5 % (ref 0–1.9)
BILIRUB SERPL-MCNC: 2.4 MG/DL (ref 0.1–1)
BNP SERPL-MCNC: 67 PG/ML (ref 0–99)
BUN SERPL-MCNC: 6 MG/DL (ref 6–20)
CALCIUM SERPL-MCNC: 9.4 MG/DL (ref 8.7–10.5)
CHLORIDE SERPL-SCNC: 102 MMOL/L (ref 95–110)
CO2 SERPL-SCNC: 29 MMOL/L (ref 23–29)
CREAT SERPL-MCNC: 0.5 MG/DL (ref 0.5–1.4)
DIFFERENTIAL METHOD BLD: ABNORMAL
EOSINOPHIL # BLD AUTO: 0.3 K/UL (ref 0–0.5)
EOSINOPHIL NFR BLD: 3 % (ref 0–8)
ERYTHROCYTE [DISTWIDTH] IN BLOOD BY AUTOMATED COUNT: 20.2 % (ref 11.5–14.5)
EST. GFR  (NO RACE VARIABLE): >60 ML/MIN/1.73 M^2
GLUCOSE SERPL-MCNC: 79 MG/DL (ref 70–110)
HCG INTACT+B SERPL-ACNC: 0.68 MIU/ML
HCT VFR BLD AUTO: 24.1 % (ref 37–48.5)
HGB BLD-MCNC: 7.9 G/DL (ref 12–16)
HYPOCHROMIA BLD QL SMEAR: ABNORMAL
IMM GRANULOCYTES # BLD AUTO: 0.04 K/UL (ref 0–0.04)
IMM GRANULOCYTES NFR BLD AUTO: 0.4 % (ref 0–0.5)
LYMPHOCYTES # BLD AUTO: 4.3 K/UL (ref 1–4.8)
LYMPHOCYTES NFR BLD: 46.1 % (ref 18–48)
MAGNESIUM SERPL-MCNC: 1.7 MG/DL (ref 1.6–2.6)
MCH RBC QN AUTO: 31 PG (ref 27–31)
MCHC RBC AUTO-ENTMCNC: 32.8 G/DL (ref 32–36)
MCV RBC AUTO: 95 FL (ref 82–98)
MONOCYTES # BLD AUTO: 1.3 K/UL (ref 0.3–1)
MONOCYTES NFR BLD: 14.3 % (ref 4–15)
NEUTROPHILS # BLD AUTO: 3.3 K/UL (ref 1.8–7.7)
NEUTROPHILS NFR BLD: 35.7 % (ref 38–73)
NRBC BLD-RTO: 1 /100 WBC
PLATELET # BLD AUTO: 429 K/UL (ref 150–450)
PMV BLD AUTO: 10.2 FL (ref 9.2–12.9)
POIKILOCYTOSIS BLD QL SMEAR: ABNORMAL
POLYCHROMASIA BLD QL SMEAR: ABNORMAL
POTASSIUM SERPL-SCNC: 3.5 MMOL/L (ref 3.5–5.1)
PROT SERPL-MCNC: 8.1 G/DL (ref 6–8.4)
RBC # BLD AUTO: 2.55 M/UL (ref 4–5.4)
RETICS/RBC NFR AUTO: 10 % (ref 0.5–2.5)
SICKLE CELLS BLD QL SMEAR: ABNORMAL
SODIUM SERPL-SCNC: 136 MMOL/L (ref 136–145)
SPHEROCYTES BLD QL SMEAR: ABNORMAL
STOMATOCYTES BLD QL SMEAR: PRESENT
TARGETS BLD QL SMEAR: ABNORMAL
TROPONIN I SERPL HS-MCNC: 6.5 PG/ML (ref 0–14.9)
WBC # BLD AUTO: 9.26 K/UL (ref 3.9–12.7)

## 2024-03-23 PROCEDURE — 99285 EMERGENCY DEPT VISIT HI MDM: CPT | Mod: 25

## 2024-03-23 PROCEDURE — 93005 ELECTROCARDIOGRAM TRACING: CPT | Performed by: INTERNAL MEDICINE

## 2024-03-23 PROCEDURE — 96375 TX/PRO/DX INJ NEW DRUG ADDON: CPT

## 2024-03-23 PROCEDURE — 25000003 PHARM REV CODE 250: Performed by: STUDENT IN AN ORGANIZED HEALTH CARE EDUCATION/TRAINING PROGRAM

## 2024-03-23 PROCEDURE — 83880 ASSAY OF NATRIURETIC PEPTIDE: CPT

## 2024-03-23 PROCEDURE — 84702 CHORIONIC GONADOTROPIN TEST: CPT

## 2024-03-23 PROCEDURE — 96374 THER/PROPH/DIAG INJ IV PUSH: CPT

## 2024-03-23 PROCEDURE — S5010 5% DEXTROSE AND 0.45% SALINE: HCPCS | Performed by: STUDENT IN AN ORGANIZED HEALTH CARE EDUCATION/TRAINING PROGRAM

## 2024-03-23 PROCEDURE — 80053 COMPREHEN METABOLIC PANEL: CPT

## 2024-03-23 PROCEDURE — 63600175 PHARM REV CODE 636 W HCPCS

## 2024-03-23 PROCEDURE — 93010 ELECTROCARDIOGRAM REPORT: CPT | Mod: ,,, | Performed by: INTERNAL MEDICINE

## 2024-03-23 PROCEDURE — 83735 ASSAY OF MAGNESIUM: CPT

## 2024-03-23 PROCEDURE — 96376 TX/PRO/DX INJ SAME DRUG ADON: CPT

## 2024-03-23 PROCEDURE — 96361 HYDRATE IV INFUSION ADD-ON: CPT

## 2024-03-23 PROCEDURE — 84484 ASSAY OF TROPONIN QUANT: CPT

## 2024-03-23 PROCEDURE — 85045 AUTOMATED RETICULOCYTE COUNT: CPT

## 2024-03-23 PROCEDURE — 85025 COMPLETE CBC W/AUTO DIFF WBC: CPT

## 2024-03-23 PROCEDURE — 21400001 HC TELEMETRY ROOM

## 2024-03-23 RX ORDER — DEXTROSE MONOHYDRATE AND SODIUM CHLORIDE 5; .45 G/100ML; G/100ML
INJECTION, SOLUTION INTRAVENOUS CONTINUOUS
Status: DISCONTINUED | OUTPATIENT
Start: 2024-03-23 | End: 2024-03-24

## 2024-03-23 RX ORDER — HYDROMORPHONE HYDROCHLORIDE 1 MG/ML
2 INJECTION, SOLUTION INTRAMUSCULAR; INTRAVENOUS; SUBCUTANEOUS
Status: COMPLETED | OUTPATIENT
Start: 2024-03-23 | End: 2024-03-23

## 2024-03-23 RX ORDER — DEXTROSE MONOHYDRATE AND SODIUM CHLORIDE 5; .45 G/100ML; G/100ML
INJECTION, SOLUTION INTRAVENOUS CONTINUOUS
Status: DISCONTINUED | OUTPATIENT
Start: 2024-03-24 | End: 2024-03-23

## 2024-03-23 RX ORDER — KETOROLAC TROMETHAMINE 30 MG/ML
15 INJECTION, SOLUTION INTRAMUSCULAR; INTRAVENOUS
Status: COMPLETED | OUTPATIENT
Start: 2024-03-23 | End: 2024-03-23

## 2024-03-23 RX ADMIN — HYDROMORPHONE HYDROCHLORIDE 2 MG: 1 INJECTION, SOLUTION INTRAMUSCULAR; INTRAVENOUS; SUBCUTANEOUS at 11:03

## 2024-03-23 RX ADMIN — KETOROLAC TROMETHAMINE 15 MG: 30 INJECTION, SOLUTION INTRAMUSCULAR at 11:03

## 2024-03-23 RX ADMIN — DEXTROSE AND SODIUM CHLORIDE: 5; 450 INJECTION, SOLUTION INTRAVENOUS at 11:03

## 2024-03-23 RX ADMIN — HYDROMORPHONE HYDROCHLORIDE 2 MG: 1 INJECTION, SOLUTION INTRAMUSCULAR; INTRAVENOUS; SUBCUTANEOUS at 10:03

## 2024-03-23 NOTE — Clinical Note
Diagnosis: Sickle cell crisis [123359]   Future Attending Provider: JESU PEÑALOZA [[id number]]   Admit to which facility:: [facility] [[id number]]   Reason for IP Medical Treatment  (Clinical interventions that can only be accomplished in the IP setting? ) :: Sickle Cell Disease Vasocclusive Crisis   I certify that Inpatient services for greater than or equal to 2 midnights are medically necessary:: Yes   Plans for Post-Acute care--if anticipated (pick the single best option):: A. No post acute care anticipated at this time

## 2024-03-24 PROBLEM — D57.1 SICKLE CELL DISEASE: Status: ACTIVE | Noted: 2024-03-24

## 2024-03-24 LAB
ALBUMIN SERPL BCP-MCNC: 3.9 G/DL (ref 3.5–5.2)
ALP SERPL-CCNC: 66 U/L (ref 55–135)
ALT SERPL W/O P-5'-P-CCNC: 11 U/L (ref 10–44)
ANION GAP SERPL CALC-SCNC: 6 MMOL/L (ref 8–16)
AST SERPL-CCNC: 28 U/L (ref 10–40)
BASOPHILS # BLD AUTO: 0.05 K/UL (ref 0–0.2)
BASOPHILS NFR BLD: 0.6 % (ref 0–1.9)
BILIRUB SERPL-MCNC: 1.9 MG/DL (ref 0.1–1)
BUN SERPL-MCNC: 5 MG/DL (ref 6–20)
CALCIUM SERPL-MCNC: 8.9 MG/DL (ref 8.7–10.5)
CHLORIDE SERPL-SCNC: 104 MMOL/L (ref 95–110)
CO2 SERPL-SCNC: 27 MMOL/L (ref 23–29)
CREAT SERPL-MCNC: 0.5 MG/DL (ref 0.5–1.4)
DIFFERENTIAL METHOD BLD: ABNORMAL
EOSINOPHIL # BLD AUTO: 0.4 K/UL (ref 0–0.5)
EOSINOPHIL NFR BLD: 3.9 % (ref 0–8)
ERYTHROCYTE [DISTWIDTH] IN BLOOD BY AUTOMATED COUNT: 20.5 % (ref 11.5–14.5)
EST. GFR  (NO RACE VARIABLE): >60 ML/MIN/1.73 M^2
GLUCOSE SERPL-MCNC: 116 MG/DL (ref 70–110)
HCT VFR BLD AUTO: 21.6 % (ref 37–48.5)
HGB BLD-MCNC: 7.2 G/DL (ref 12–16)
IMM GRANULOCYTES # BLD AUTO: 0.05 K/UL (ref 0–0.04)
IMM GRANULOCYTES NFR BLD AUTO: 0.6 % (ref 0–0.5)
LYMPHOCYTES # BLD AUTO: 4.1 K/UL (ref 1–4.8)
LYMPHOCYTES NFR BLD: 46.1 % (ref 18–48)
MAGNESIUM SERPL-MCNC: 1.7 MG/DL (ref 1.6–2.6)
MCH RBC QN AUTO: 32 PG (ref 27–31)
MCHC RBC AUTO-ENTMCNC: 33.3 G/DL (ref 32–36)
MCV RBC AUTO: 96 FL (ref 82–98)
MONOCYTES # BLD AUTO: 1.2 K/UL (ref 0.3–1)
MONOCYTES NFR BLD: 13.4 % (ref 4–15)
NEUTROPHILS # BLD AUTO: 3.2 K/UL (ref 1.8–7.7)
NEUTROPHILS NFR BLD: 35.4 % (ref 38–73)
NRBC BLD-RTO: 1 /100 WBC
PLATELET # BLD AUTO: 396 K/UL (ref 150–450)
PMV BLD AUTO: 10.9 FL (ref 9.2–12.9)
POTASSIUM SERPL-SCNC: 4 MMOL/L (ref 3.5–5.1)
PROT SERPL-MCNC: 7.3 G/DL (ref 6–8.4)
RBC # BLD AUTO: 2.25 M/UL (ref 4–5.4)
SODIUM SERPL-SCNC: 137 MMOL/L (ref 136–145)
TROPONIN I SERPL HS-MCNC: 6.6 PG/ML (ref 0–14.9)
WBC # BLD AUTO: 8.96 K/UL (ref 3.9–12.7)

## 2024-03-24 PROCEDURE — 36415 COLL VENOUS BLD VENIPUNCTURE: CPT | Performed by: INTERNAL MEDICINE

## 2024-03-24 PROCEDURE — 25000003 PHARM REV CODE 250

## 2024-03-24 PROCEDURE — 80053 COMPREHEN METABOLIC PANEL: CPT | Performed by: INTERNAL MEDICINE

## 2024-03-24 PROCEDURE — 27000221 HC OXYGEN, UP TO 24 HOURS

## 2024-03-24 PROCEDURE — 99900031 HC PATIENT EDUCATION (STAT)

## 2024-03-24 PROCEDURE — 84484 ASSAY OF TROPONIN QUANT: CPT

## 2024-03-24 PROCEDURE — 63600175 PHARM REV CODE 636 W HCPCS: Performed by: INTERNAL MEDICINE

## 2024-03-24 PROCEDURE — 85025 COMPLETE CBC W/AUTO DIFF WBC: CPT | Performed by: INTERNAL MEDICINE

## 2024-03-24 PROCEDURE — 25000003 PHARM REV CODE 250: Performed by: INTERNAL MEDICINE

## 2024-03-24 PROCEDURE — 96361 HYDRATE IV INFUSION ADD-ON: CPT

## 2024-03-24 PROCEDURE — 21400001 HC TELEMETRY ROOM

## 2024-03-24 PROCEDURE — 94761 N-INVAS EAR/PLS OXIMETRY MLT: CPT

## 2024-03-24 PROCEDURE — 63600175 PHARM REV CODE 636 W HCPCS

## 2024-03-24 PROCEDURE — 83735 ASSAY OF MAGNESIUM: CPT | Performed by: INTERNAL MEDICINE

## 2024-03-24 RX ORDER — SODIUM,POTASSIUM PHOSPHATES 280-250MG
2 POWDER IN PACKET (EA) ORAL
Status: DISCONTINUED | OUTPATIENT
Start: 2024-03-24 | End: 2024-03-27 | Stop reason: HOSPADM

## 2024-03-24 RX ORDER — FOLIC ACID 1 MG/1
1 TABLET ORAL DAILY
Status: DISCONTINUED | OUTPATIENT
Start: 2024-03-24 | End: 2024-03-27 | Stop reason: HOSPADM

## 2024-03-24 RX ORDER — IBUPROFEN 200 MG
24 TABLET ORAL
Status: DISCONTINUED | OUTPATIENT
Start: 2024-03-24 | End: 2024-03-27 | Stop reason: HOSPADM

## 2024-03-24 RX ORDER — IBUPROFEN 200 MG
16 TABLET ORAL
Status: DISCONTINUED | OUTPATIENT
Start: 2024-03-24 | End: 2024-03-27 | Stop reason: HOSPADM

## 2024-03-24 RX ORDER — NALOXONE HCL 0.4 MG/ML
0.02 VIAL (ML) INJECTION
Status: DISCONTINUED | OUTPATIENT
Start: 2024-03-24 | End: 2024-03-27 | Stop reason: HOSPADM

## 2024-03-24 RX ORDER — HYDROXYUREA 500 MG/1
1000 CAPSULE ORAL DAILY
Status: DISCONTINUED | OUTPATIENT
Start: 2024-03-24 | End: 2024-03-27 | Stop reason: HOSPADM

## 2024-03-24 RX ORDER — MORPHINE SULFATE 2 MG/ML
1 INJECTION, SOLUTION INTRAMUSCULAR; INTRAVENOUS EVERY 4 HOURS PRN
Status: DISCONTINUED | OUTPATIENT
Start: 2024-03-24 | End: 2024-03-24

## 2024-03-24 RX ORDER — LANOLIN ALCOHOL/MO/W.PET/CERES
800 CREAM (GRAM) TOPICAL
Status: DISCONTINUED | OUTPATIENT
Start: 2024-03-24 | End: 2024-03-27 | Stop reason: HOSPADM

## 2024-03-24 RX ORDER — SODIUM CHLORIDE 0.9 % (FLUSH) 0.9 %
2 SYRINGE (ML) INJECTION EVERY 12 HOURS PRN
Status: DISCONTINUED | OUTPATIENT
Start: 2024-03-24 | End: 2024-03-27 | Stop reason: HOSPADM

## 2024-03-24 RX ORDER — HYDROCODONE BITARTRATE AND ACETAMINOPHEN 5; 325 MG/1; MG/1
1 TABLET ORAL EVERY 6 HOURS PRN
Status: DISCONTINUED | OUTPATIENT
Start: 2024-03-24 | End: 2024-03-24

## 2024-03-24 RX ORDER — ACETAMINOPHEN 500 MG
1000 TABLET ORAL
Status: ACTIVE | OUTPATIENT
Start: 2024-03-24 | End: 2024-03-24

## 2024-03-24 RX ORDER — MORPHINE SULFATE 15 MG/1
30 TABLET, FILM COATED, EXTENDED RELEASE ORAL EVERY 12 HOURS
Status: DISCONTINUED | OUTPATIENT
Start: 2024-03-24 | End: 2024-03-25

## 2024-03-24 RX ORDER — HYDROMORPHONE HYDROCHLORIDE 1 MG/ML
2 INJECTION, SOLUTION INTRAMUSCULAR; INTRAVENOUS; SUBCUTANEOUS
Status: COMPLETED | OUTPATIENT
Start: 2024-03-24 | End: 2024-03-24

## 2024-03-24 RX ORDER — TALC
6 POWDER (GRAM) TOPICAL NIGHTLY PRN
Status: DISCONTINUED | OUTPATIENT
Start: 2024-03-24 | End: 2024-03-27 | Stop reason: HOSPADM

## 2024-03-24 RX ORDER — GABAPENTIN 100 MG/1
100 CAPSULE ORAL 3 TIMES DAILY
COMMUNITY

## 2024-03-24 RX ORDER — VERAPAMIL HYDROCHLORIDE 80 MG/1
1 TABLET ORAL 3 TIMES DAILY
COMMUNITY
Start: 2023-10-11

## 2024-03-24 RX ORDER — ACETAMINOPHEN 325 MG/1
650 TABLET ORAL EVERY 4 HOURS PRN
Status: DISCONTINUED | OUTPATIENT
Start: 2024-03-24 | End: 2024-03-27 | Stop reason: HOSPADM

## 2024-03-24 RX ORDER — SODIUM CHLORIDE 450 MG/100ML
INJECTION, SOLUTION INTRAVENOUS CONTINUOUS
Status: DISCONTINUED | OUTPATIENT
Start: 2024-03-24 | End: 2024-03-26

## 2024-03-24 RX ORDER — GLUTAMINE 5 G/1
15 POWDER, FOR SOLUTION ORAL
COMMUNITY

## 2024-03-24 RX ORDER — POLYETHYLENE GLYCOL 3350 17 G/17G
17 POWDER, FOR SOLUTION ORAL DAILY
Status: DISCONTINUED | OUTPATIENT
Start: 2024-03-24 | End: 2024-03-27 | Stop reason: HOSPADM

## 2024-03-24 RX ORDER — GLUCAGON 1 MG
1 KIT INJECTION
Status: DISCONTINUED | OUTPATIENT
Start: 2024-03-24 | End: 2024-03-27 | Stop reason: HOSPADM

## 2024-03-24 RX ORDER — HYDROMORPHONE HYDROCHLORIDE 1 MG/ML
1 INJECTION, SOLUTION INTRAMUSCULAR; INTRAVENOUS; SUBCUTANEOUS ONCE
Status: COMPLETED | OUTPATIENT
Start: 2024-03-24 | End: 2024-03-24

## 2024-03-24 RX ORDER — HYDROMORPHONE HYDROCHLORIDE 1 MG/ML
1 INJECTION, SOLUTION INTRAMUSCULAR; INTRAVENOUS; SUBCUTANEOUS
Status: DISCONTINUED | OUTPATIENT
Start: 2024-03-24 | End: 2024-03-24

## 2024-03-24 RX ORDER — ALUMINUM HYDROXIDE, MAGNESIUM HYDROXIDE, AND SIMETHICONE 1200; 120; 1200 MG/30ML; MG/30ML; MG/30ML
30 SUSPENSION ORAL 4 TIMES DAILY PRN
Status: DISCONTINUED | OUTPATIENT
Start: 2024-03-24 | End: 2024-03-27 | Stop reason: HOSPADM

## 2024-03-24 RX ORDER — MORPHINE SULFATE 15 MG/1
30 TABLET, FILM COATED, EXTENDED RELEASE ORAL EVERY 12 HOURS
Status: DISCONTINUED | OUTPATIENT
Start: 2024-03-24 | End: 2024-03-24

## 2024-03-24 RX ORDER — AMITRIPTYLINE HYDROCHLORIDE 50 MG/1
1 TABLET, FILM COATED ORAL NIGHTLY
COMMUNITY
Start: 2024-03-13 | End: 2025-03-13

## 2024-03-24 RX ORDER — SEMAGLUTIDE 0.68 MG/ML
.25-.5 INJECTION, SOLUTION SUBCUTANEOUS
COMMUNITY
Start: 2024-02-29

## 2024-03-24 RX ORDER — ZOLPIDEM TARTRATE 10 MG/1
10 TABLET ORAL NIGHTLY PRN
COMMUNITY

## 2024-03-24 RX ORDER — ACETAMINOPHEN 325 MG/1
650 TABLET ORAL EVERY 8 HOURS PRN
Status: DISCONTINUED | OUTPATIENT
Start: 2024-03-24 | End: 2024-03-27 | Stop reason: HOSPADM

## 2024-03-24 RX ORDER — ONDANSETRON HYDROCHLORIDE 2 MG/ML
4 INJECTION, SOLUTION INTRAVENOUS EVERY 6 HOURS PRN
Status: DISCONTINUED | OUTPATIENT
Start: 2024-03-24 | End: 2024-03-27 | Stop reason: HOSPADM

## 2024-03-24 RX ORDER — HYDROMORPHONE HYDROCHLORIDE 1 MG/ML
2 INJECTION, SOLUTION INTRAMUSCULAR; INTRAVENOUS; SUBCUTANEOUS
Status: DISCONTINUED | OUTPATIENT
Start: 2024-03-24 | End: 2024-03-25

## 2024-03-24 RX ADMIN — MULTIVITAMIN TABLET 1 TABLET: TABLET at 10:03

## 2024-03-24 RX ADMIN — HYDROCODONE BITARTRATE AND ACETAMINOPHEN 1 TABLET: 5; 325 TABLET ORAL at 09:03

## 2024-03-24 RX ADMIN — HYDROMORPHONE HYDROCHLORIDE 1 MG: 1 INJECTION, SOLUTION INTRAMUSCULAR; INTRAVENOUS; SUBCUTANEOUS at 09:03

## 2024-03-24 RX ADMIN — HYDROMORPHONE HYDROCHLORIDE 2 MG: 1 INJECTION, SOLUTION INTRAMUSCULAR; INTRAVENOUS; SUBCUTANEOUS at 07:03

## 2024-03-24 RX ADMIN — HYDROXYUREA 1000 MG: 500 CAPSULE ORAL at 10:03

## 2024-03-24 RX ADMIN — FOLIC ACID 1 MG: 1 TABLET ORAL at 10:03

## 2024-03-24 RX ADMIN — SODIUM CHLORIDE: 0.45 INJECTION, SOLUTION INTRAVENOUS at 03:03

## 2024-03-24 RX ADMIN — POLYETHYLENE GLYCOL 3350 17 G: 17 POWDER, FOR SOLUTION ORAL at 11:03

## 2024-03-24 RX ADMIN — HYDROMORPHONE HYDROCHLORIDE 2 MG: 1 INJECTION, SOLUTION INTRAMUSCULAR; INTRAVENOUS; SUBCUTANEOUS at 05:03

## 2024-03-24 RX ADMIN — MORPHINE SULFATE 30 MG: 15 TABLET, EXTENDED RELEASE ORAL at 11:03

## 2024-03-24 RX ADMIN — HYDROMORPHONE HYDROCHLORIDE 1 MG: 1 INJECTION, SOLUTION INTRAMUSCULAR; INTRAVENOUS; SUBCUTANEOUS at 05:03

## 2024-03-24 RX ADMIN — HYDROMORPHONE HYDROCHLORIDE 2 MG: 1 INJECTION, SOLUTION INTRAMUSCULAR; INTRAVENOUS; SUBCUTANEOUS at 12:03

## 2024-03-24 RX ADMIN — HYDROMORPHONE HYDROCHLORIDE 2 MG: 1 INJECTION, SOLUTION INTRAMUSCULAR; INTRAVENOUS; SUBCUTANEOUS at 10:03

## 2024-03-24 RX ADMIN — MORPHINE SULFATE 30 MG: 15 TABLET, EXTENDED RELEASE ORAL at 08:03

## 2024-03-24 NOTE — HPI
This is a 47 y/o  female w/ pmh of SCD, anxiety who p/w CC of pain in her hips, buttocks, legs worsening over the past day. Patient rates pain as 10/10 in severity. Patient denies headache, blurry vision, chest pain, SOB, abdominal pain, N/V and dysuria at this time. Patient is afebrile, hemodynamically stable and saturating well on RA.   CXR manifests findings suggestive of mild vascular overload, and no other acute cardiopulmonary disease. Bilateral hip & pelvix x-ray showed normal views of both hips as well as the pelvis.

## 2024-03-24 NOTE — H&P
[facility] - Emergency Dept  Hospital Medicine  History & Physical    Patient Name: Sunita Mendez  MRN: [id number]  Patient Class: IP- Inpatient  Admission Date: 3/23/2024  Attending Physician: Alfredo Brandon MD  Primary Care Provider: Sebastián Chambers MD         Patient information was obtained from patient and ER records.     Subjective:     Principal Problem:Sickle cell disease    Chief Complaint:   Chief Complaint   Patient presents with    Sickle Cell Pain Crisis     Pain in hips and BLE     Shortness of Breath        HPI: This is a 49 y/o  female w/ pmh of SCD, anxiety who p/w CC of pain in her hips, buttocks, legs worsening over the past day. Patient rates pain as 10/10 in severity. Patient denies headache, blurry vision, chest pain, SOB, abdominal pain, N/V and dysuria at this time. Patient is afebrile, hemodynamically stable and saturating well on RA.   CXR manifests findings suggestive of mild vascular overload, and no other acute cardiopulmonary disease. Bilateral hip & pelvix x-ray showed normal views of both hips as well as the pelvis.     Past Medical History:   Diagnosis Date    Anticoagulant long-term use        No past surgical history on file.    Review of patient's allergies indicates:  No Known Allergies    No current facility-administered medications on file prior to encounter.     Current Outpatient Medications on File Prior to Encounter   Medication Sig    ALPRAZolam (XANAX) 1 MG tablet Take 1 mg by mouth nightly as needed.    folic acid (FOLVITE) 1 MG tablet Take 1 tablet (1 mg total) by mouth once daily.    hydroxyurea (HYDREA) 500 mg Cap Take 1,000 mg by mouth once daily.    morphine (MS CONTIN) 30 MG 12 hr tablet Take 30 mg by mouth 2 (two) times daily as needed.    multivitamin (ONE DAILY MULTIVITAMIN) per tablet Take 1 tablet by mouth once daily.    oxyCODONE (ROXICODONE) 15 MG Tab Take 15 mg by mouth every 4 (four) hours as needed.    cyclobenzaprine (FLEXERIL)  10 MG tablet Take 1 tablet by mouth 2 (two) times daily as needed.    topiramate (TOPAMAX) 100 MG tablet Take 1 tablet (100 mg total) by mouth 2 (two) times daily.     Family History    None       Tobacco Use    Smoking status: Never    Smokeless tobacco: Never   Substance and Sexual Activity    Alcohol use: Not Currently    Drug use: Not Currently    Sexual activity: Not Currently     Review of Systems  Objective:     Vital Signs (Most Recent):  Temp: 97.9 °F (36.6 °C) (03/24/24 0333)  Pulse: 60 (03/24/24 0333)  Resp: 20 (03/24/24 0511)  BP: 120/64 (03/24/24 0333)  SpO2: 100 % (03/24/24 0333) Vital Signs (24h Range):  Temp:  [97.9 °F (36.6 °C)-98.6 °F (37 °C)] 97.9 °F (36.6 °C)  Pulse:  [60-95] 60  Resp:  [17-20] 20  SpO2:  [98 %-100 %] 100 %  BP: (117-139)/(62-72) 120/64     Weight: 78.2 kg (172 lb 6.4 oz)  Body mass index is 31.53 kg/m².     Physical Exam   Gen: In bed, NAD  Head: Normocephalic  CVD: S1/S2  Resp: No distress or accessory muscle use  GI: Abd soft, NT  Skin: Warm, dry  Neuro: Alert         Significant Labs: All pertinent labs within the past 24 hours have been reviewed.  CBC:   Recent Labs   Lab 03/23/24  2245   WBC 9.26   HGB 7.9*   HCT 24.1*        CMP:   Recent Labs   Lab 03/23/24  2245      K 3.5      CO2 29   GLU 79   BUN 6   CREATININE 0.5   CALCIUM 9.4   PROT 8.1   ALBUMIN 4.3   BILITOT 2.4*   ALKPHOS 70   AST 30   ALT 12   ANIONGAP 5*       Significant Imaging: I have reviewed all pertinent imaging results/findings within the past 24 hours.    Assessment/Plan:     * Sickle cell disease Vasocclusive Crisis  -IVF  -LDH, Haptoglobin, Reticulotye Count  -Consider Hematology consult  -Analgesic prn  -Await home med reconciliation  -Hydroxyurea  -Transfuse as indicated  -Monitor labs  -Vital sign checks      Anxiety  -Anxiolytic as needed      VTE Risk Mitigation (From admission, onward)           Ordered     IP VTE HIGH RISK PATIENT  Once         03/24/24 0224     Place  sequential compression device  Until discontinued         03/24/24 0224                                    Alfredo Brandon MD  Department of Hospital Medicine  [facility]

## 2024-03-24 NOTE — ASSESSMENT & PLAN NOTE
-IVF  -LDH, Haptoglobin, Reticulotye Count  -Consider Hematology consult  -Analgesic prn  -Hydroxyurea  -Transfuse as indicated  -Monitor labs  -Vital sign checks

## 2024-03-24 NOTE — PROGRESS NOTES
No acute events overnight.  Patient complaining of pain.  Upset with her pain regimen.  We will adjust at this time.  We will monitor in adjust as tolerated    Eric Ma MD

## 2024-03-24 NOTE — ASSESSMENT & PLAN NOTE
-IVF  -LDH, Haptoglobin, Reticulotye Count reviewed  -Consider Hematology consult  -Analgesic prn  -increased home morphine to 45 mg b.i.d.  -Hydroxyurea  -Transfuse as indicated  -Monitor labs  -Vital sign checks  -we will do 2 mg Dilaudid q.2 hours with 1 mg q.4 hr breakthrough  -patient has known avascular necrosis.  States that her hip pain is worse than normal.  We will order MRI to rule out any other etiology

## 2024-03-24 NOTE — PLAN OF CARE
[facility]  Initial Discharge Assessment       Primary Care Provider: Sebastián Chambers MD    Admission Diagnosis: Sickle cell crisis [D57.00]    Admission Date: 3/23/2024  Expected Discharge Date:     Pt is a 48 year-old female who arrived from home with Sickle cell disease Vasocclusive Crisis   problem. Information verified as correct on facesheet. The assessment was completed at the patient's bedside.  Pt lives with kids. Pt does not have a Living Will or Advance Directive. The Pt is oriented to person, places, and times. Pt said she see her PCP every month next appt. is next week.  Pt denies Coumadin, dialysis, DME, HH, and has not been readmitted into the hospital in the last thirty days.  Pt is capable of performing ADLs without assistance. Pt drivers to and from medical appointments. Pt reports she/he takes medication as prescribed; pharmacy used [facility] on [address].  Pt verbalized plan to discharge home via family transport. Pt has no other needs to be addressed at this time. CM reviewed the chart and will continue to monitor.         Payor: MEDICAID / Plan: [place] [employer] / Product Type: Managed Medicaid /     Extended Emergency Contact Information  Primary Emergency Contact: Alisa Carty  Mobile Phone: [phone]  Relation: Mother  Preferred language: English   needed? No    Discharge Plan A: Home with family  Discharge Plan B: Home with family      [id number] - [place] - [address] [facility] [address]  [address] [address]  Phone: [phone] Fax: [phone]      Initial Assessment (most recent)       Adult Discharge Assessment - 03/24/24 1448          Discharge Assessment    Assessment Type Discharge Planning Assessment     Confirmed/corrected address, phone number and insurance Yes     Confirmed Demographics Correct on Facesheet     Source of Information patient     When was your last  doctors appointment? --   Pt said she see her PCP every month, and she has an appointment with him next week.    Reason For Admission Sickle cell disease Vasocclusive Crisis     People in Home child(leonela), adult     Do you expect to return to your current living situation? Yes     Do you have help at home or someone to help you manage your care at home? Yes     Who are your caregiver(s) and their phone number(s)? Alisa Carty (Mother) [phone] (Mobile)     Walking or Climbing Stairs Difficulty yes     Walking or Climbing Stairs stair climbing difficulty, dependent     Dressing/Bathing Difficulty no     Home Accessibility not wheelchair accessible     Equipment Currently Used at Home none     Readmission within 30 days? No     Patient currently being followed by outpatient case management? No     Do you currently have service(s) that help you manage your care at home? No     Do you take prescription medications? Yes     Do you have prescription coverage? Yes     Coverage [place] - [place] [facility] -     Do you have any problems affording any of your prescribed medications? No     Is the patient taking medications as prescribed? yes     Who is going to help you get home at discharge? Alisa Carty (Mother) [phone] (Mobile)     How do you get to doctors appointments? car, drives self     Are you on dialysis? No     Do you take coumadin? No     Discharge Plan A Home with family     Discharge Plan B Home with family     DME Needed Upon Discharge  none

## 2024-03-24 NOTE — ED PROVIDER NOTES
Encounter Date: 3/23/2024       History     Chief Complaint   Patient presents with    Sickle Cell Pain Crisis     Pain in hips and BLE     Shortness of Breath     HPI    48-year-old female with past medical history of sickle cell disease, avascular necrosis, PE, presented to emergency department for lower leg pain.  Pain started yesterday near her hips and extending to her legs.  Has tried to take oxycodone, OxyContin, and morphine with no pain relief.  Thus came to the emergency department.  Been having her son's help her walk due to pain.  States that her typical sickle cell crisis includes her whole body.  Also having shortness of breath with pain.  Denies fever, chills, chest pain, cough, nausea, vomiting, congestion.      Review of patient's allergies indicates:  No Known Allergies  Past Medical History:   Diagnosis Date    Anticoagulant long-term use      No past surgical history on file.  No family history on file.  Social History     Tobacco Use    Smoking status: Never    Smokeless tobacco: Never   Substance Use Topics    Alcohol use: Not Currently    Drug use: Not Currently     Review of Systems    See HPI, otherwise negative    Physical Exam     Initial Vitals [03/23/24 2211]   BP Pulse Resp Temp SpO2   117/68 89 18 98.6 °F (37 °C) 98 %      MAP       --         Physical Exam    Constitutional: She appears well-developed and well-nourished.   Appears to be in discomfort due to pain.   HENT:   Head: Normocephalic and atraumatic.   Eyes: Conjunctivae and EOM are normal. Pupils are equal, round, and reactive to light.   Neck: Neck supple.   Normal range of motion.  Pulmonary/Chest: Breath sounds normal. She has no rhonchi. She has no rales.   Abdominal: Abdomen is soft.   Musculoskeletal:         General: Normal range of motion.      Cervical back: Normal range of motion and neck supple.      Comments: Pain to palpation of lower extremities.  Tolerated range of motions at hip joints with minimal pain.      Neurological: She is alert and oriented to person, place, and time. No sensory deficit.   Skin: Skin is warm and dry.   Psychiatric: She has a normal mood and affect.         ED Course   Procedures  Labs Reviewed   CBC W/ AUTO DIFFERENTIAL - Abnormal; Notable for the following components:       Result Value    RBC 2.55 (*)     Hemoglobin 7.9 (*)     Hematocrit 24.1 (*)     RDW 20.2 (*)     Mono # 1.3 (*)     nRBC 1 (*)     Gran % 35.7 (*)     Sickle Cells Moderate (*)     All other components within normal limits    Narrative:     Release to patient->Immediate   COMPREHENSIVE METABOLIC PANEL - Abnormal; Notable for the following components:    Total Bilirubin 2.4 (*)     Anion Gap 5 (*)     All other components within normal limits    Narrative:     Release to patient->Immediate   RETICULOCYTES - Abnormal; Notable for the following components:    Retic 10.0 (*)     All other components within normal limits    Narrative:     Release to patient->Immediate   MAGNESIUM    Narrative:     Release to patient->Immediate   TROPONIN I HIGH SENSITIVITY    Narrative:     Release to patient->Immediate   TROPONIN I HIGH SENSITIVITY   B-TYPE NATRIURETIC PEPTIDE    Narrative:     Release to patient->Immediate   HCG, QUANTITATIVE    Narrative:     Release to patient->Immediate          Imaging Results              X-Ray Hips Bilateral 2 View Incl AP Pelvis (Final result)  Result time 03/24/24 01:59:02      Final result by Neville David MD (03/24/24 01:59:02)                   Narrative:    XR HIP 5 OR MORE VIEWS BILATERAL    COMPARISONS: None.    ADDITIONAL PERTINENT HISTORY: Pain    FINDINGS:    Osseous structures: Negative.    Joint spaces: Negative.    Surrounding soft tissues: Significant increase in stool along the entirety of the colon with no underlying obstruction.    IMPRESSION:  1. Normal views of both hips as well as the pelvis.  2. Underlying severe constipation.    Electronically signed by:  Neville David MD   03/24/2024 01:59 AM CDT Workstation: [id number]                                     X-Ray Chest AP Portable (Final result)  Result time 03/24/24 01:58:19      Final result by Neville David MD (03/24/24 01:58:19)                   Narrative:    XR CHEST 1 VIEW    COMPARISONS: Single view chest dated December 23, 2023    ADDITIONAL PERTINENT HISTORY: Chest pain    FINDINGS:    Life-support:  None.    Cardiomediastinal silhouette:  Moderate cardiomegaly, stable from previous exam.    Pulmonary vasculature:  Vascular congestion without neida pulmonary edema.    Lung fields:  Negative.    Pleural spaces: Negative.    Osseous structures:  Negative.    Surrounding soft tissues:  Negative.      IMPRESSION:  1. Findings suggestive of mild vascular overload.  2. No other acute cardiopulmonary disease.    Electronically signed by:  Neville David MD  03/24/2024 01:58 AM CDT Workstation: [id number]                                     Medications   acetaminophen tablet 1,000 mg (1,000 mg Oral Not Given 3/24/24 0051)   HYDROmorphone injection 2 mg (2 mg Intravenous Given 3/23/24 2246)   HYDROmorphone injection 2 mg (2 mg Intravenous Given 3/23/24 2345)   ketorolac injection 15 mg (15 mg Intravenous Given 3/23/24 2346)   HYDROmorphone injection 2 mg (2 mg Intravenous Given 3/24/24 0051)     Medical Decision Making  Problems Addressed:  Sickle cell crisis: chronic illness or injury with exacerbation, progression, or side effects of treatment    Amount and/or Complexity of Data Reviewed  Labs: ordered. Decision-making details documented in ED Course.     Details: See narrative below  Radiology: ordered and independent interpretation performed.     Details: See narrative below  ECG/medicine tests: ordered and independent interpretation performed.     Details: See narrative below    Risk  OTC drugs.  Prescription drug management.  Decision regarding hospitalization.       PGY3 MDM:    In brief, 48-year-old female with past medical  history of sickle cell, avascular necrosis    Vital stable, patient appears to be in discomfort due to pain.    Pertinent physical exam per above    Differentials:  Vaso-occlusive crisis, ACS, among others.  Considered PE, however pain seemed to be associated with pain, satting appropriately on room air.    Orders:  Labs, reticulocyte count, chest x-ray    Interventions:  Dilaudid 2 mg IV, IV fluids    Results:    Abnormal/pertinent labs:  Fatigue count 10, hemoglobin 7.9 (1 point drop from baseline),    Imaging:  Chest x-ray suggestive of mild vascular overload    EKG:  Normal sinus rhythm with no ST elevations or depressions    Reassessment:  Continues to have pain    Further interventions:  Dilaudid 2 mg IV and Toradol 15 mg IV, IV fluids discontinued    Reassessment:  Continues to have pain    Interventions:  Given additional Dilaudid 2 mg IV    Consults:  Medicine for sickle cell vaso-occlusive crisis    Dispo:  Admitted to medicine.      Vivi Maxwell MD  [employer] EM PGY3            ED Course as of 03/24/24 0212   Sat Mar 23, 2024   2312 Retic(!): 10.0 [KH]   2321 BILIRUBIN TOTAL(!): 2.4 [KH]   2321 Retic(!): 10.0 [KH]   2321 Hemoglobin(!): 7.9 [KH]      ED Course User Index  [KH] Remy Cannon MD                           Clinical Impression:  Final diagnoses:  [D57.00] Sickle cell crisis (Primary)          ED Disposition Condition    Admit Stable                Vivi Maxwell MD  Resident  03/24/24 0212

## 2024-03-24 NOTE — SUBJECTIVE & OBJECTIVE
Past Medical History:   Diagnosis Date    Anticoagulant long-term use        No past surgical history on file.    Review of patient's allergies indicates:  No Known Allergies    No current facility-administered medications on file prior to encounter.     Current Outpatient Medications on File Prior to Encounter   Medication Sig    ALPRAZolam (XANAX) 1 MG tablet Take 1 mg by mouth nightly as needed.    folic acid (FOLVITE) 1 MG tablet Take 1 tablet (1 mg total) by mouth once daily.    hydroxyurea (HYDREA) 500 mg Cap Take 1,000 mg by mouth once daily.    morphine (MS CONTIN) 30 MG 12 hr tablet Take 30 mg by mouth 2 (two) times daily as needed.    multivitamin (ONE DAILY MULTIVITAMIN) per tablet Take 1 tablet by mouth once daily.    oxyCODONE (ROXICODONE) 15 MG Tab Take 15 mg by mouth every 4 (four) hours as needed.    cyclobenzaprine (FLEXERIL) 10 MG tablet Take 1 tablet by mouth 2 (two) times daily as needed.    topiramate (TOPAMAX) 100 MG tablet Take 1 tablet (100 mg total) by mouth 2 (two) times daily.     Family History    None       Tobacco Use    Smoking status: Never    Smokeless tobacco: Never   Substance and Sexual Activity    Alcohol use: Not Currently    Drug use: Not Currently    Sexual activity: Not Currently     Review of Systems  Objective:     Vital Signs (Most Recent):  Temp: 97.9 °F (36.6 °C) (03/24/24 0333)  Pulse: 60 (03/24/24 0333)  Resp: 20 (03/24/24 0511)  BP: 120/64 (03/24/24 0333)  SpO2: 100 % (03/24/24 0333) Vital Signs (24h Range):  Temp:  [97.9 °F (36.6 °C)-98.6 °F (37 °C)] 97.9 °F (36.6 °C)  Pulse:  [60-95] 60  Resp:  [17-20] 20  SpO2:  [98 %-100 %] 100 %  BP: (117-139)/(62-72) 120/64     Weight: 78.2 kg (172 lb 6.4 oz)  Body mass index is 31.53 kg/m².     Physical Exam  Vitals reviewed.   Constitutional:       General: She is not in acute distress.     Appearance: Normal appearance.   HENT:      Head: Normocephalic and atraumatic.      Right Ear: External ear normal.      Left Ear:  External ear normal.      Nose: Nose normal.      Mouth/Throat:      Mouth: Mucous membranes are moist.      Pharynx: Oropharynx is clear.   Eyes:      Extraocular Movements: Extraocular movements intact.      Conjunctiva/sclera: Conjunctivae normal.   Cardiovascular:      Rate and Rhythm: Regular rhythm. Tachycardia present.      Pulses: Normal pulses.      Heart sounds: Normal heart sounds. No murmur heard.     No gallop.   Pulmonary:      Effort: Pulmonary effort is normal. No respiratory distress.      Breath sounds: Normal breath sounds. No wheezing or rales.   Abdominal:      General: Abdomen is flat. There is no distension.      Palpations: Abdomen is soft.      Tenderness: There is no abdominal tenderness. There is no guarding.   Musculoskeletal:         General: No swelling. Normal range of motion.      Cervical back: Normal range of motion and neck supple.   Skin:     General: Skin is warm and dry.   Neurological:      General: No focal deficit present.      Mental Status: She is alert and oriented to person, place, and time.               Significant Labs: All pertinent labs within the past 24 hours have been reviewed.  CBC:   Recent Labs   Lab 03/23/24  2245   WBC 9.26   HGB 7.9*   HCT 24.1*        CMP:   Recent Labs   Lab 03/23/24  2245      K 3.5      CO2 29   GLU 79   BUN 6   CREATININE 0.5   CALCIUM 9.4   PROT 8.1   ALBUMIN 4.3   BILITOT 2.4*   ALKPHOS 70   AST 30   ALT 12   ANIONGAP 5*       Significant Imaging: I have reviewed all pertinent imaging results/findings within the past 24 hours.

## 2024-03-24 NOTE — PHARMACY MED REC
"              .        Admission Medication History     The home medication history was taken by Mendy Amezcua.    You may go to "Admission" then "Reconcile Home Medications" tabs to review and/or act upon these items.     The home medication list has been updated by the Pharmacy department.   Please read ALL comments highlighted in yellow.   Please address this information as you see fit.    Feel free to contact us if you have any questions or require assistance.        Medications listed below were obtained from: Patient/family and Analytic software- [employer]  No current facility-administered medications on file prior to encounter.     Current Outpatient Medications on File Prior to Encounter   Medication Sig Dispense Refill    ALPRAZolam (XANAX) 1 MG tablet Take 1 mg by mouth nightly as needed for Anxiety.      amitriptyline (ELAVIL) 50 MG tablet Take 1 tablet by mouth every evening.      folic acid (FOLVITE) 1 MG tablet Take 1 tablet (1 mg total) by mouth once daily. 30 tablet 0    gabapentin (NEURONTIN) 100 MG capsule Take 100 mg by mouth 3 (three) times daily.      glutamine, sickle cell, (ENDARI) 5 gram PwPk Take 15 g by mouth 3 (three) times daily with meals.      hydroxyurea (HYDREA) 500 mg Cap Take 1,000 mg by mouth once daily.      morphine (MS CONTIN) 30 MG 12 hr tablet Take 30 mg by mouth 2 (two) times daily as needed for Pain.      multivitamin (ONE DAILY MULTIVITAMIN) per tablet Take 1 tablet by mouth once daily.      oxyCODONE (ROXICODONE) 15 MG Tab Take 15 mg by mouth every 4 (four) hours as needed for Pain.      verapamiL (CALAN) 80 MG tablet Take 1 tablet by mouth 3 (three) times daily.      zolpidem (AMBIEN) 10 mg Tab Take 10 mg by mouth nightly as needed.      OZEMPIC 0.25 mg or 0.5 mg (2 mg/3 mL) pen injector Inject 0.25-0.5 mg into the skin every 7 days.      topiramate (TOPAMAX) 100 MG tablet Take 1 tablet (100 mg total) by mouth 2 (two) times daily. (Patient not taking: " Reported on 3/24/2024) 60 tablet 0    [DISCONTINUED] cyclobenzaprine (FLEXERIL) 10 MG tablet Take 1 tablet by mouth 2 (two) times daily as needed.         Potential issues to be addressed PRIOR TO DISCHARGE  Patient reported not taking the following medications: (Topiramate & Ozempic ). These medications remain on the home medication list. Please address accordingly.     Mendy Amezcua  EXT 1924

## 2024-03-24 NOTE — PLAN OF CARE
Problem: Infection (Pneumonia)  Goal: Resolution of Infection Signs and Symptoms  Outcome: Ongoing, Progressing     Problem: Respiratory Compromise (Pneumonia)  Goal: Effective Oxygenation and Ventilation  Outcome: Ongoing, Progressing     Problem: Fall Injury Risk  Goal: Absence of Fall and Fall-Related Injury  Outcome: Ongoing, Progressing     Problem: Pain Chronic (Persistent)  Goal: Acceptable Pain Control and Functional Ability  Outcome: Ongoing, Progressing

## 2024-03-24 NOTE — SUBJECTIVE & OBJECTIVE
Past Medical History:   Diagnosis Date    Anticoagulant long-term use        No past surgical history on file.    Review of patient's allergies indicates:  No Known Allergies    No current facility-administered medications on file prior to encounter.     Current Outpatient Medications on File Prior to Encounter   Medication Sig    ALPRAZolam (XANAX) 1 MG tablet Take 1 mg by mouth nightly as needed.    cyclobenzaprine (FLEXERIL) 10 MG tablet Take 1 tablet by mouth 2 (two) times daily as needed.    folic acid (FOLVITE) 1 MG tablet Take 1 tablet (1 mg total) by mouth once daily.    hydroxyurea (HYDREA) 500 mg Cap Take 1,000 mg by mouth once daily.    morphine (MS CONTIN) 30 MG 12 hr tablet Take 30 mg by mouth 2 (two) times daily as needed.    multivitamin (ONE DAILY MULTIVITAMIN) per tablet Take 1 tablet by mouth once daily.    oxyCODONE (ROXICODONE) 15 MG Tab Take 15 mg by mouth every 4 (four) hours as needed.    topiramate (TOPAMAX) 100 MG tablet Take 1 tablet (100 mg total) by mouth 2 (two) times daily.     Family History    None       Tobacco Use    Smoking status: Never    Smokeless tobacco: Never   Substance and Sexual Activity    Alcohol use: Not Currently    Drug use: Not Currently    Sexual activity: Not Currently     Review of Systems  Objective:     Vital Signs (Most Recent):  Temp: 98.6 °F (37 °C) (03/23/24 2211)  Pulse: 95 (03/23/24 2230)  Resp: 18 (03/24/24 0051)  BP: 139/72 (03/23/24 2230)  SpO2: 100 % (03/23/24 2230) Vital Signs (24h Range):  Temp:  [98.6 °F (37 °C)] 98.6 °F (37 °C)  Pulse:  [76-95] 95  Resp:  [18] 18  SpO2:  [98 %-100 %] 100 %  BP: (117-139)/(66-72) 139/72     Weight: 78.5 kg (173 lb)  Body mass index is 31.64 kg/m².     Physical Exam   Gen: In bed, NAD  Head: Normocephalic  CVD: S1/S2  Resp: No distress or accessory muscle use  GI: Abd soft, ND  Skin: Warm, dry  Neuro: Alert         Significant Labs: All pertinent labs within the past 24 hours have been reviewed.  CBC:   Recent Labs    Lab 03/23/24  2245   WBC 9.26   HGB 7.9*   HCT 24.1*        CMP:   Recent Labs   Lab 03/23/24  2245      K 3.5      CO2 29   GLU 79   BUN 6   CREATININE 0.5   CALCIUM 9.4   PROT 8.1   ALBUMIN 4.3   BILITOT 2.4*   ALKPHOS 70   AST 30   ALT 12   ANIONGAP 5*       Significant Imaging: I have reviewed all pertinent imaging results/findings within the past 24 hours.

## 2024-03-24 NOTE — NURSING
Pain management plan reviewed with pt, States that IV Dilaudid works better for her sickle cell pain. MD notified and made aware. One dose ordered per MD.

## 2024-03-24 NOTE — PLAN OF CARE
Problem: Pain Chronic (Persistent)  Goal: Acceptable Pain Control and Functional Ability  Outcome: Ongoing, Progressing     Problem: Fall Injury Risk  Goal: Absence of Fall and Fall-Related Injury  Outcome: Ongoing, Progressing     Problem: Fluid Imbalance (Pneumonia)  Goal: Fluid Balance  Outcome: Ongoing, Progressing

## 2024-03-24 NOTE — NURSING
Nurses Note -- 4 Eyes      3/24/2024   4:43 AM      Skin assessed during: Admit      [] No Altered Skin Integrity Present    []Prevention Measures Documented  Refused Skin Assessment Upon Admission    [] Yes- Altered Skin Integrity Present or Discovered   [] LDA Added if Not in Epic (Describe Wound)   [] New Altered Skin Integrity was Present on Admit and Documented in LDA   [] Wound Image Taken    Wound Care Consulted? No    Attending Nurse:  Sofia Bravo RN/Staff Member:   [id number]

## 2024-03-25 LAB
ALBUMIN SERPL BCP-MCNC: 4 G/DL (ref 3.5–5.2)
ALP SERPL-CCNC: 66 U/L (ref 55–135)
ALT SERPL W/O P-5'-P-CCNC: 11 U/L (ref 10–44)
ANION GAP SERPL CALC-SCNC: 1 MMOL/L (ref 8–16)
AST SERPL-CCNC: 29 U/L (ref 10–40)
BASOPHILS # BLD AUTO: 0.04 K/UL (ref 0–0.2)
BASOPHILS NFR BLD: 0.4 % (ref 0–1.9)
BILIRUB SERPL-MCNC: 1.8 MG/DL (ref 0.1–1)
BUN SERPL-MCNC: 6 MG/DL (ref 6–20)
CALCIUM SERPL-MCNC: 9.2 MG/DL (ref 8.7–10.5)
CHLORIDE SERPL-SCNC: 104 MMOL/L (ref 95–110)
CO2 SERPL-SCNC: 33 MMOL/L (ref 23–29)
CREAT SERPL-MCNC: 0.5 MG/DL (ref 0.5–1.4)
DIFFERENTIAL METHOD BLD: ABNORMAL
EOSINOPHIL # BLD AUTO: 0.5 K/UL (ref 0–0.5)
EOSINOPHIL NFR BLD: 5.8 % (ref 0–8)
ERYTHROCYTE [DISTWIDTH] IN BLOOD BY AUTOMATED COUNT: 19.8 % (ref 11.5–14.5)
EST. GFR  (NO RACE VARIABLE): >60 ML/MIN/1.73 M^2
GLUCOSE SERPL-MCNC: 98 MG/DL (ref 70–110)
HCT VFR BLD AUTO: 21.9 % (ref 37–48.5)
HGB BLD-MCNC: 7.1 G/DL (ref 12–16)
IMM GRANULOCYTES # BLD AUTO: 0.05 K/UL (ref 0–0.04)
IMM GRANULOCYTES NFR BLD AUTO: 0.5 % (ref 0–0.5)
LDH SERPL L TO P-CCNC: 574 U/L (ref 110–260)
LYMPHOCYTES # BLD AUTO: 3.2 K/UL (ref 1–4.8)
LYMPHOCYTES NFR BLD: 34.7 % (ref 18–48)
MAGNESIUM SERPL-MCNC: 2.3 MG/DL (ref 1.6–2.6)
MCH RBC QN AUTO: 30.9 PG (ref 27–31)
MCHC RBC AUTO-ENTMCNC: 32.4 G/DL (ref 32–36)
MCV RBC AUTO: 95 FL (ref 82–98)
MONOCYTES # BLD AUTO: 1.3 K/UL (ref 0.3–1)
MONOCYTES NFR BLD: 13.9 % (ref 4–15)
NEUTROPHILS # BLD AUTO: 4.1 K/UL (ref 1.8–7.7)
NEUTROPHILS NFR BLD: 44.7 % (ref 38–73)
NRBC BLD-RTO: 1 /100 WBC
PLATELET # BLD AUTO: 365 K/UL (ref 150–450)
PMV BLD AUTO: 10.3 FL (ref 9.2–12.9)
POTASSIUM SERPL-SCNC: 4.5 MMOL/L (ref 3.5–5.1)
PROT SERPL-MCNC: 7.5 G/DL (ref 6–8.4)
RBC # BLD AUTO: 2.3 M/UL (ref 4–5.4)
SODIUM SERPL-SCNC: 138 MMOL/L (ref 136–145)
WBC # BLD AUTO: 9.27 K/UL (ref 3.9–12.7)

## 2024-03-25 PROCEDURE — 63600175 PHARM REV CODE 636 W HCPCS: Performed by: INTERNAL MEDICINE

## 2024-03-25 PROCEDURE — 99900031 HC PATIENT EDUCATION (STAT)

## 2024-03-25 PROCEDURE — 27000221 HC OXYGEN, UP TO 24 HOURS

## 2024-03-25 PROCEDURE — 21400001 HC TELEMETRY ROOM

## 2024-03-25 PROCEDURE — 83615 LACTATE (LD) (LDH) ENZYME: CPT | Performed by: INTERNAL MEDICINE

## 2024-03-25 PROCEDURE — 36415 COLL VENOUS BLD VENIPUNCTURE: CPT | Performed by: INTERNAL MEDICINE

## 2024-03-25 PROCEDURE — 94761 N-INVAS EAR/PLS OXIMETRY MLT: CPT

## 2024-03-25 PROCEDURE — 25000003 PHARM REV CODE 250: Performed by: INTERNAL MEDICINE

## 2024-03-25 PROCEDURE — 83735 ASSAY OF MAGNESIUM: CPT | Performed by: INTERNAL MEDICINE

## 2024-03-25 PROCEDURE — 85025 COMPLETE CBC W/AUTO DIFF WBC: CPT | Performed by: INTERNAL MEDICINE

## 2024-03-25 PROCEDURE — 80053 COMPREHEN METABOLIC PANEL: CPT | Performed by: INTERNAL MEDICINE

## 2024-03-25 RX ORDER — HYDROMORPHONE HYDROCHLORIDE 2 MG/ML
2 INJECTION, SOLUTION INTRAMUSCULAR; INTRAVENOUS; SUBCUTANEOUS
Status: DISCONTINUED | OUTPATIENT
Start: 2024-03-25 | End: 2024-03-25

## 2024-03-25 RX ORDER — HYDROMORPHONE HYDROCHLORIDE 1 MG/ML
1 INJECTION, SOLUTION INTRAMUSCULAR; INTRAVENOUS; SUBCUTANEOUS EVERY 4 HOURS PRN
Status: DISCONTINUED | OUTPATIENT
Start: 2024-03-25 | End: 2024-03-26

## 2024-03-25 RX ORDER — MAGNESIUM SULFATE HEPTAHYDRATE 40 MG/ML
2 INJECTION, SOLUTION INTRAVENOUS ONCE
Status: COMPLETED | OUTPATIENT
Start: 2024-03-25 | End: 2024-03-25

## 2024-03-25 RX ORDER — HYDROMORPHONE HYDROCHLORIDE 1 MG/ML
2 INJECTION, SOLUTION INTRAMUSCULAR; INTRAVENOUS; SUBCUTANEOUS
Status: DISCONTINUED | OUTPATIENT
Start: 2024-03-25 | End: 2024-03-26

## 2024-03-25 RX ORDER — DIAZEPAM 10 MG/2ML
10 INJECTION INTRAMUSCULAR ONCE
Status: COMPLETED | OUTPATIENT
Start: 2024-03-25 | End: 2024-03-25

## 2024-03-25 RX ORDER — MORPHINE SULFATE 15 MG/1
45 TABLET, FILM COATED, EXTENDED RELEASE ORAL EVERY 12 HOURS
Status: DISCONTINUED | OUTPATIENT
Start: 2024-03-25 | End: 2024-03-26

## 2024-03-25 RX ADMIN — SODIUM CHLORIDE, POTASSIUM CHLORIDE, SODIUM LACTATE AND CALCIUM CHLORIDE 1000 ML: 600; 310; 30; 20 INJECTION, SOLUTION INTRAVENOUS at 01:03

## 2024-03-25 RX ADMIN — HYDROMORPHONE HYDROCHLORIDE 2 MG: 1 INJECTION, SOLUTION INTRAMUSCULAR; INTRAVENOUS; SUBCUTANEOUS at 01:03

## 2024-03-25 RX ADMIN — HYDROMORPHONE HYDROCHLORIDE 1 MG: 1 INJECTION, SOLUTION INTRAMUSCULAR; INTRAVENOUS; SUBCUTANEOUS at 07:03

## 2024-03-25 RX ADMIN — HYDROMORPHONE HYDROCHLORIDE 2 MG: 1 INJECTION, SOLUTION INTRAMUSCULAR; INTRAVENOUS; SUBCUTANEOUS at 04:03

## 2024-03-25 RX ADMIN — HYDROMORPHONE HYDROCHLORIDE 2 MG: 1 INJECTION, SOLUTION INTRAMUSCULAR; INTRAVENOUS; SUBCUTANEOUS at 12:03

## 2024-03-25 RX ADMIN — HYDROMORPHONE HYDROCHLORIDE 2 MG: 1 INJECTION, SOLUTION INTRAMUSCULAR; INTRAVENOUS; SUBCUTANEOUS at 08:03

## 2024-03-25 RX ADMIN — MORPHINE SULFATE 30 MG: 15 TABLET, EXTENDED RELEASE ORAL at 08:03

## 2024-03-25 RX ADMIN — POLYETHYLENE GLYCOL 3350 17 G: 17 POWDER, FOR SOLUTION ORAL at 08:03

## 2024-03-25 RX ADMIN — MULTIVITAMIN TABLET 1 TABLET: TABLET at 08:03

## 2024-03-25 RX ADMIN — HYDROMORPHONE HYDROCHLORIDE 2 MG: 1 INJECTION, SOLUTION INTRAMUSCULAR; INTRAVENOUS; SUBCUTANEOUS at 07:03

## 2024-03-25 RX ADMIN — MAGNESIUM SULFATE 2 G: 2 INJECTION INTRAVENOUS at 01:03

## 2024-03-25 RX ADMIN — HYDROMORPHONE HYDROCHLORIDE 2 MG: 2 INJECTION INTRAMUSCULAR; INTRAVENOUS; SUBCUTANEOUS at 10:03

## 2024-03-25 RX ADMIN — HYDROXYUREA 1000 MG: 500 CAPSULE ORAL at 08:03

## 2024-03-25 RX ADMIN — HYDROMORPHONE HYDROCHLORIDE 2 MG: 1 INJECTION, SOLUTION INTRAMUSCULAR; INTRAVENOUS; SUBCUTANEOUS at 06:03

## 2024-03-25 RX ADMIN — HYDROMORPHONE HYDROCHLORIDE 2 MG: 1 INJECTION, SOLUTION INTRAMUSCULAR; INTRAVENOUS; SUBCUTANEOUS at 02:03

## 2024-03-25 RX ADMIN — DIAZEPAM 10 MG: 10 INJECTION, SOLUTION INTRAMUSCULAR; INTRAVENOUS at 01:03

## 2024-03-25 RX ADMIN — SODIUM CHLORIDE: 0.45 INJECTION, SOLUTION INTRAVENOUS at 04:03

## 2024-03-25 RX ADMIN — MORPHINE SULFATE 45 MG: 15 TABLET, EXTENDED RELEASE ORAL at 08:03

## 2024-03-25 RX ADMIN — HYDROMORPHONE HYDROCHLORIDE 1 MG: 1 INJECTION, SOLUTION INTRAMUSCULAR; INTRAVENOUS; SUBCUTANEOUS at 02:03

## 2024-03-25 RX ADMIN — FOLIC ACID 1 MG: 1 TABLET ORAL at 08:03

## 2024-03-25 RX ADMIN — HYDROMORPHONE HYDROCHLORIDE 2 MG: 1 INJECTION, SOLUTION INTRAMUSCULAR; INTRAVENOUS; SUBCUTANEOUS at 10:03

## 2024-03-25 NOTE — CARE UPDATE
03/25/24 1347   Patient Assessment/Suction   Level of Consciousness (AVPU) alert   Respiratory Effort Normal;Unlabored   Expansion/Accessory Muscles/Retractions no use of accessory muscles;no retractions;expansion symmetric   All Lung Fields Breath Sounds clear   LLL Breath Sounds diminished   RML Breath Sounds diminished   Rhythm/Pattern, Respiratory unlabored;depth regular   Cough Frequency no cough   PRE-TX-O2   Device (Oxygen Therapy) nasal cannula   $ Is the patient on Low Flow Oxygen? Yes   Flow (L/min) 1   SpO2 97 %   Pulse Oximetry Type Intermittent   $ Pulse Oximetry - Multiple Charge Pulse Oximetry - Multiple   Education   $ Education 15 min;Other (see comment)  (SATS)

## 2024-03-25 NOTE — PROGRESS NOTES
[facility] Medicine  Progress Note    Patient Name: Sunita Mendez  MRN: [id number]  Patient Class: IP- Inpatient   Admission Date: 3/23/2024  Length of Stay: 1 days  Attending Physician: Eric Ma Jr., MD  Primary Care Provider: Sebastián Chambers MD        Subjective:     Principal Problem:Sickle cell disease        HPI:  This is a 47 y/o  female w/ pmh of SCD, anxiety who p/w CC of pain in her hips, buttocks, legs worsening over the past day. Patient rates pain as 10/10 in severity. Patient denies headache, blurry vision, chest pain, SOB, abdominal pain, N/V and dysuria at this time. Patient is afebrile, hemodynamically stable and saturating well on RA.   CXR manifests findings suggestive of mild vascular overload, and no other acute cardiopulmonary disease. Bilateral hip & pelvix x-ray showed normal views of both hips as well as the pelvis.     Overview/Hospital Course:  No notes on file    Subjective:  Patient states her pain worse than normal.  States her current regimen is not helping.  We will increase with some breakthrough today.  Monitor.    Past Medical History:   Diagnosis Date    Anticoagulant long-term use        No past surgical history on file.    Review of patient's allergies indicates:  No Known Allergies    No current facility-administered medications on file prior to encounter.     Current Outpatient Medications on File Prior to Encounter   Medication Sig    ALPRAZolam (XANAX) 1 MG tablet Take 1 mg by mouth nightly as needed.    folic acid (FOLVITE) 1 MG tablet Take 1 tablet (1 mg total) by mouth once daily.    hydroxyurea (HYDREA) 500 mg Cap Take 1,000 mg by mouth once daily.    morphine (MS CONTIN) 30 MG 12 hr tablet Take 30 mg by mouth 2 (two) times daily as needed.    multivitamin (ONE DAILY MULTIVITAMIN) per tablet Take 1 tablet by mouth once daily.    oxyCODONE (ROXICODONE) 15 MG Tab Take 15 mg by mouth every 4 (four) hours as needed.     cyclobenzaprine (FLEXERIL) 10 MG tablet Take 1 tablet by mouth 2 (two) times daily as needed.    topiramate (TOPAMAX) 100 MG tablet Take 1 tablet (100 mg total) by mouth 2 (two) times daily.     Family History    None       Tobacco Use    Smoking status: Never    Smokeless tobacco: Never   Substance and Sexual Activity    Alcohol use: Not Currently    Drug use: Not Currently    Sexual activity: Not Currently     Review of Systems  Objective:     Vital Signs (Most Recent):  Temp: 97.9 °F (36.6 °C) (03/24/24 0333)  Pulse: 60 (03/24/24 0333)  Resp: 20 (03/24/24 0511)  BP: 120/64 (03/24/24 0333)  SpO2: 100 % (03/24/24 0333) Vital Signs (24h Range):  Temp:  [97.9 °F (36.6 °C)-98.6 °F (37 °C)] 97.9 °F (36.6 °C)  Pulse:  [60-95] 60  Resp:  [17-20] 20  SpO2:  [98 %-100 %] 100 %  BP: (117-139)/(62-72) 120/64     Weight: 78.2 kg (172 lb 6.4 oz)  Body mass index is 31.53 kg/m².     Physical Exam  Vitals reviewed.   Constitutional:       General: She is not in acute distress.     Appearance: Normal appearance.   HENT:      Head: Normocephalic and atraumatic.      Right Ear: External ear normal.      Left Ear: External ear normal.      Nose: Nose normal.      Mouth/Throat:      Mouth: Mucous membranes are moist.      Pharynx: Oropharynx is clear.   Eyes:      Extraocular Movements: Extraocular movements intact.      Conjunctiva/sclera: Conjunctivae normal.   Cardiovascular:      Rate and Rhythm: Regular rhythm. Tachycardia present.      Pulses: Normal pulses.      Heart sounds: Normal heart sounds. No murmur heard.     No gallop.   Pulmonary:      Effort: Pulmonary effort is normal. No respiratory distress.      Breath sounds: Normal breath sounds. No wheezing or rales.   Abdominal:      General: Abdomen is flat. There is no distension.      Palpations: Abdomen is soft.      Tenderness: There is no abdominal tenderness. There is no guarding.   Musculoskeletal:         General: No swelling. Normal range of motion.      Cervical  back: Normal range of motion and neck supple.   Skin:     General: Skin is warm and dry.   Neurological:      General: No focal deficit present.      Mental Status: She is alert and oriented to person, place, and time.               Significant Labs: All pertinent labs within the past 24 hours have been reviewed.  CBC:   Recent Labs   Lab 03/23/24  2245   WBC 9.26   HGB 7.9*   HCT 24.1*        CMP:   Recent Labs   Lab 03/23/24  2245      K 3.5      CO2 29   GLU 79   BUN 6   CREATININE 0.5   CALCIUM 9.4   PROT 8.1   ALBUMIN 4.3   BILITOT 2.4*   ALKPHOS 70   AST 30   ALT 12   ANIONGAP 5*       Significant Imaging: I have reviewed all pertinent imaging results/findings within the past 24 hours.    Assessment/Plan:      * Sickle cell disease Vasocclusive Crisis  -IVF  -LDH, Haptoglobin, Reticulotye Count reviewed  -Consider Hematology consult  -Analgesic prn  -increased home morphine to 45 mg b.i.d.  -Hydroxyurea  -Transfuse as indicated  -Monitor labs  -Vital sign checks  -we will do 2 mg Dilaudid q.2 hours with 1 mg q.4 hr breakthrough  -patient has known avascular necrosis.  States that her hip pain is worse than normal.  We will order MRI to rule out any other etiology      Anxiety  -Anxiolytic as needed      VTE Risk Mitigation (From admission, onward)           Ordered     IP VTE HIGH RISK PATIENT  Once         03/24/24 0224     Place sequential compression device  Until discontinued         03/24/24 0224                    Discharge Planning   CORRINE: 3/27/2024     Code Status: Full Code   Is the patient medically ready for discharge?:     Reason for patient still in hospital (select all that apply): Treatment  Discharge Plan A: Home with family                  Eric Ma Jr, MD  Department of Hospital Medicine   [facility]

## 2024-03-26 LAB
ALBUMIN SERPL BCP-MCNC: 3.7 G/DL (ref 3.5–5.2)
ALP SERPL-CCNC: 76 U/L (ref 55–135)
ALT SERPL W/O P-5'-P-CCNC: 11 U/L (ref 10–44)
ANION GAP SERPL CALC-SCNC: 4 MMOL/L (ref 8–16)
AST SERPL-CCNC: 33 U/L (ref 10–40)
BASOPHILS # BLD AUTO: 0.06 K/UL (ref 0–0.2)
BASOPHILS NFR BLD: 0.6 % (ref 0–1.9)
BILIRUB SERPL-MCNC: 1.9 MG/DL (ref 0.1–1)
BUN SERPL-MCNC: 7 MG/DL (ref 6–20)
CALCIUM SERPL-MCNC: 8.8 MG/DL (ref 8.7–10.5)
CHLORIDE SERPL-SCNC: 105 MMOL/L (ref 95–110)
CO2 SERPL-SCNC: 28 MMOL/L (ref 23–29)
CREAT SERPL-MCNC: 0.4 MG/DL (ref 0.5–1.4)
DIFFERENTIAL METHOD BLD: ABNORMAL
EOSINOPHIL # BLD AUTO: 0.6 K/UL (ref 0–0.5)
EOSINOPHIL NFR BLD: 5.8 % (ref 0–8)
ERYTHROCYTE [DISTWIDTH] IN BLOOD BY AUTOMATED COUNT: 20 % (ref 11.5–14.5)
EST. GFR  (NO RACE VARIABLE): >60 ML/MIN/1.73 M^2
GLUCOSE SERPL-MCNC: 98 MG/DL (ref 70–110)
HCT VFR BLD AUTO: 20.6 % (ref 37–48.5)
HGB BLD-MCNC: 6.7 G/DL (ref 12–16)
IMM GRANULOCYTES # BLD AUTO: 0.04 K/UL (ref 0–0.04)
IMM GRANULOCYTES NFR BLD AUTO: 0.4 % (ref 0–0.5)
LYMPHOCYTES # BLD AUTO: 3.6 K/UL (ref 1–4.8)
LYMPHOCYTES NFR BLD: 36.9 % (ref 18–48)
MAGNESIUM SERPL-MCNC: 1.8 MG/DL (ref 1.6–2.6)
MCH RBC QN AUTO: 31.3 PG (ref 27–31)
MCHC RBC AUTO-ENTMCNC: 32.5 G/DL (ref 32–36)
MCV RBC AUTO: 96 FL (ref 82–98)
MONOCYTES # BLD AUTO: 1.1 K/UL (ref 0.3–1)
MONOCYTES NFR BLD: 11.2 % (ref 4–15)
NEUTROPHILS # BLD AUTO: 4.4 K/UL (ref 1.8–7.7)
NEUTROPHILS NFR BLD: 45.1 % (ref 38–73)
NRBC BLD-RTO: 1 /100 WBC
PLATELET # BLD AUTO: 240 K/UL (ref 150–450)
PMV BLD AUTO: 11.5 FL (ref 9.2–12.9)
POTASSIUM SERPL-SCNC: 4.9 MMOL/L (ref 3.5–5.1)
PROT SERPL-MCNC: 7 G/DL (ref 6–8.4)
RBC # BLD AUTO: 2.14 M/UL (ref 4–5.4)
SODIUM SERPL-SCNC: 137 MMOL/L (ref 136–145)
WBC # BLD AUTO: 9.65 K/UL (ref 3.9–12.7)

## 2024-03-26 PROCEDURE — 21400001 HC TELEMETRY ROOM

## 2024-03-26 PROCEDURE — 83735 ASSAY OF MAGNESIUM: CPT | Performed by: INTERNAL MEDICINE

## 2024-03-26 PROCEDURE — 63600175 PHARM REV CODE 636 W HCPCS: Performed by: NURSE PRACTITIONER

## 2024-03-26 PROCEDURE — 25000003 PHARM REV CODE 250: Performed by: INTERNAL MEDICINE

## 2024-03-26 PROCEDURE — 99900031 HC PATIENT EDUCATION (STAT)

## 2024-03-26 PROCEDURE — 25000003 PHARM REV CODE 250: Performed by: NURSE PRACTITIONER

## 2024-03-26 PROCEDURE — 36415 COLL VENOUS BLD VENIPUNCTURE: CPT | Performed by: INTERNAL MEDICINE

## 2024-03-26 PROCEDURE — 80053 COMPREHEN METABOLIC PANEL: CPT | Performed by: INTERNAL MEDICINE

## 2024-03-26 PROCEDURE — 94761 N-INVAS EAR/PLS OXIMETRY MLT: CPT

## 2024-03-26 PROCEDURE — 63600175 PHARM REV CODE 636 W HCPCS: Performed by: INTERNAL MEDICINE

## 2024-03-26 PROCEDURE — 27000221 HC OXYGEN, UP TO 24 HOURS

## 2024-03-26 PROCEDURE — 85025 COMPLETE CBC W/AUTO DIFF WBC: CPT | Performed by: INTERNAL MEDICINE

## 2024-03-26 RX ORDER — BISACODYL 5 MG
5 TABLET, DELAYED RELEASE (ENTERIC COATED) ORAL ONCE
Status: DISCONTINUED | OUTPATIENT
Start: 2024-03-26 | End: 2024-03-27 | Stop reason: HOSPADM

## 2024-03-26 RX ORDER — HYDROMORPHONE HYDROCHLORIDE 1 MG/ML
2 INJECTION, SOLUTION INTRAMUSCULAR; INTRAVENOUS; SUBCUTANEOUS
Status: DISCONTINUED | OUTPATIENT
Start: 2024-03-26 | End: 2024-03-27 | Stop reason: HOSPADM

## 2024-03-26 RX ORDER — LORAZEPAM 2 MG/ML
1 INJECTION INTRAMUSCULAR
Status: DISCONTINUED | OUTPATIENT
Start: 2024-03-26 | End: 2024-03-27 | Stop reason: HOSPADM

## 2024-03-26 RX ORDER — IPRATROPIUM BROMIDE AND ALBUTEROL SULFATE 2.5; .5 MG/3ML; MG/3ML
3 SOLUTION RESPIRATORY (INHALATION) EVERY 4 HOURS PRN
Status: DISCONTINUED | OUTPATIENT
Start: 2024-03-26 | End: 2024-03-27 | Stop reason: HOSPADM

## 2024-03-26 RX ORDER — MORPHINE SULFATE 15 MG/1
30 TABLET, FILM COATED, EXTENDED RELEASE ORAL EVERY 8 HOURS
Status: DISCONTINUED | OUTPATIENT
Start: 2024-03-26 | End: 2024-03-27 | Stop reason: HOSPADM

## 2024-03-26 RX ORDER — OXYCODONE HYDROCHLORIDE 5 MG/1
15 TABLET ORAL EVERY 4 HOURS PRN
Status: DISCONTINUED | OUTPATIENT
Start: 2024-03-26 | End: 2024-03-27 | Stop reason: HOSPADM

## 2024-03-26 RX ADMIN — MULTIVITAMIN TABLET 1 TABLET: TABLET at 08:03

## 2024-03-26 RX ADMIN — HYDROMORPHONE HYDROCHLORIDE 2 MG: 1 INJECTION, SOLUTION INTRAMUSCULAR; INTRAVENOUS; SUBCUTANEOUS at 07:03

## 2024-03-26 RX ADMIN — POLYETHYLENE GLYCOL 3350 17 G: 17 POWDER, FOR SOLUTION ORAL at 08:03

## 2024-03-26 RX ADMIN — OXYCODONE HYDROCHLORIDE 15 MG: 5 TABLET ORAL at 07:03

## 2024-03-26 RX ADMIN — HYDROMORPHONE HYDROCHLORIDE 2 MG: 1 INJECTION, SOLUTION INTRAMUSCULAR; INTRAVENOUS; SUBCUTANEOUS at 03:03

## 2024-03-26 RX ADMIN — SODIUM CHLORIDE: 0.45 INJECTION, SOLUTION INTRAVENOUS at 02:03

## 2024-03-26 RX ADMIN — HYDROXYUREA 1000 MG: 500 CAPSULE ORAL at 08:03

## 2024-03-26 RX ADMIN — FOLIC ACID 1 MG: 1 TABLET ORAL at 08:03

## 2024-03-26 RX ADMIN — MORPHINE SULFATE 30 MG: 15 TABLET, EXTENDED RELEASE ORAL at 10:03

## 2024-03-26 RX ADMIN — HYDROMORPHONE HYDROCHLORIDE 2 MG: 1 INJECTION, SOLUTION INTRAMUSCULAR; INTRAVENOUS; SUBCUTANEOUS at 12:03

## 2024-03-26 RX ADMIN — BISACODYL 5 MG: 5 TABLET, COATED ORAL at 07:03

## 2024-03-26 RX ADMIN — HYDROMORPHONE HYDROCHLORIDE 2 MG: 1 INJECTION, SOLUTION INTRAMUSCULAR; INTRAVENOUS; SUBCUTANEOUS at 09:03

## 2024-03-26 RX ADMIN — HYDROMORPHONE HYDROCHLORIDE 2 MG: 1 INJECTION, SOLUTION INTRAMUSCULAR; INTRAVENOUS; SUBCUTANEOUS at 04:03

## 2024-03-26 RX ADMIN — HYDROMORPHONE HYDROCHLORIDE 1 MG: 1 INJECTION, SOLUTION INTRAMUSCULAR; INTRAVENOUS; SUBCUTANEOUS at 02:03

## 2024-03-26 RX ADMIN — MORPHINE SULFATE 45 MG: 15 TABLET, EXTENDED RELEASE ORAL at 08:03

## 2024-03-26 RX ADMIN — HYDROMORPHONE HYDROCHLORIDE 2 MG: 1 INJECTION, SOLUTION INTRAMUSCULAR; INTRAVENOUS; SUBCUTANEOUS at 01:03

## 2024-03-26 RX ADMIN — HYDROMORPHONE HYDROCHLORIDE 2 MG: 1 INJECTION, SOLUTION INTRAMUSCULAR; INTRAVENOUS; SUBCUTANEOUS at 11:03

## 2024-03-26 NOTE — HOSPITAL COURSE
Patient was monitored closely during her hospital stay.  She required IV Dilaudid and IV fluid hydration for acute sickle cell crisis.  She was experiencing significant pain to her bilateral hips and has history of avascular necrosis and MRI bilateral hips ordered for evaluation.  She was unable to tolerate an MRI after 2 attempts and this will be done outpatient. Her CBC was trended and remained low but stable and at patient's baseline.  Her white blood cells were noted to increase to 15,000, and she had no overt symptoms of infection.  She was noted to have fluctuations in her white blood cells, possibly due to her sickle cell disease.  She has an appointment set up with the PCP and she will keep this appointment.  She was noted to have constipation which resolved with her laxative.  Her sickle cell crisis has improved, and her pain has been well-controlled.  She is being discharged to home today in stable condition.  A UA was ordered however she was unable to obtain this prior to going home and her WBC should be rechecked with her next outpatient labs.  She will follow up with her PCP and hematologist.  She did not have any adverse events while here.

## 2024-03-26 NOTE — PROGRESS NOTES
Per nurse juliano, pts pain is not under control. Pt will not be able to hold still for an hour & 1/2 to complete right & left hip mri orders. Pt was asked yesterday if delaudid was given could she hold still. She said maybe for 20 minutes. Mri's will not be able to be completed in 20 minutes.

## 2024-03-26 NOTE — NURSING
"Notified CRISTAL Brandon MD that patient admitted with sickle cell crisis her current bp is 97/57 map 72. She has been getting dilaudid 2mg and 1mg breakthrough around the clock. She is asking for a dose now but her BP is low. She has 1/2 NS going at 125ml/hr. MD also notified that lab called in a critical Hgb of 6.6 this morning. MD responded stating "Hold IV dilaudid and give 500cc 0.45% NS bolus."       "

## 2024-03-26 NOTE — SUBJECTIVE & OBJECTIVE
Interval History:  No acute events overnight.  Adjusted pain medicines substantially yesterday.  Patient is still saying it has not working.  Upset because unable to change medications or give her any medications that lower blood pressure, because she is gotten hypotensive from her pain regimen.  Patient would like new provider.  Pending MRI when patient is able to lay still for an hour    Review of Systems  Objective:     Vital Signs (Most Recent):  Temp: 98.5 °F (36.9 °C) (03/26/24 0722)  Pulse: 77 (03/26/24 0915)  Resp: 18 (03/26/24 0922)  BP: 108/67 (03/26/24 0722)  SpO2: (!) 92 % (03/26/24 0915) Vital Signs (24h Range):  Temp:  [97 °F (36.1 °C)-98.7 °F (37.1 °C)] 98.5 °F (36.9 °C)  Pulse:  [75-94] 77  Resp:  [16-20] 18  SpO2:  [92 %-100 %] 92 %  BP: ()/(57-70) 108/67     Weight: 78.2 kg (172 lb 6.4 oz)  Body mass index is 31.53 kg/m².    Intake/Output Summary (Last 24 hours) at 3/26/2024 1116  Last data filed at 3/26/2024 0338  Gross per 24 hour   Intake 480 ml   Output 600 ml   Net -120 ml         Physical Exam  Vitals reviewed.   Constitutional:       General: She is not in acute distress.     Appearance: Normal appearance.   HENT:      Head: Normocephalic and atraumatic.      Right Ear: External ear normal.      Left Ear: External ear normal.      Nose: Nose normal.      Mouth/Throat:      Mouth: Mucous membranes are moist.      Pharynx: Oropharynx is clear.   Eyes:      Extraocular Movements: Extraocular movements intact.      Conjunctiva/sclera: Conjunctivae normal.   Cardiovascular:      Rate and Rhythm: Normal rate and regular rhythm.      Pulses: Normal pulses.      Heart sounds: Normal heart sounds. No murmur heard.     No gallop.   Pulmonary:      Effort: Pulmonary effort is normal. No respiratory distress.      Breath sounds: Normal breath sounds. No wheezing or rales.   Abdominal:      General: Abdomen is flat. There is no distension.      Palpations: Abdomen is soft.      Tenderness: There is  no abdominal tenderness. There is no guarding.   Musculoskeletal:         General: No swelling. Normal range of motion.      Cervical back: Normal range of motion and neck supple.   Skin:     General: Skin is warm and dry.   Neurological:      General: No focal deficit present.      Mental Status: She is alert and oriented to person, place, and time.             Significant Labs: All pertinent labs within the past 24 hours have been reviewed.    Significant Imaging: I have reviewed all pertinent imaging results/findings within the past 24 hours.

## 2024-03-26 NOTE — ASSESSMENT & PLAN NOTE
-IVF  -LDH, Haptoglobin, Reticulotye Count reviewed  -Consider Hematology consult  -Analgesic prn  -increased home morphine to 45 mg b.i.d.  -Hydroxyurea  -Transfuse as indicated  -Monitor labs  -Vital sign checks  -we will do 2 mg Dilaudid q.2 hours with 1 mg q.4 hr breakthrough  -patient has known avascular necrosis.  States that her hip pain is worse than normal.  We will order MRI to rule out any other etiology, pending when patient is able to lay still  -patient will have new provider tomorrow

## 2024-03-27 VITALS
BODY MASS INDEX: 31.72 KG/M2 | TEMPERATURE: 99 F | DIASTOLIC BLOOD PRESSURE: 46 MMHG | RESPIRATION RATE: 19 BRPM | HEIGHT: 62 IN | SYSTOLIC BLOOD PRESSURE: 94 MMHG | WEIGHT: 172.38 LBS | HEART RATE: 98 BPM | OXYGEN SATURATION: 100 %

## 2024-03-27 PROBLEM — D72.829 LEUKOCYTOSIS: Status: ACTIVE | Noted: 2024-03-27

## 2024-03-27 LAB
ALBUMIN SERPL BCP-MCNC: 4.1 G/DL (ref 3.5–5.2)
ALP SERPL-CCNC: 81 U/L (ref 55–135)
ALT SERPL W/O P-5'-P-CCNC: 14 U/L (ref 10–44)
ANION GAP SERPL CALC-SCNC: 4 MMOL/L (ref 8–16)
AST SERPL-CCNC: 35 U/L (ref 10–40)
BASOPHILS # BLD AUTO: 0.06 K/UL (ref 0–0.2)
BASOPHILS NFR BLD: 0.4 % (ref 0–1.9)
BILIRUB SERPL-MCNC: 2.4 MG/DL (ref 0.1–1)
BUN SERPL-MCNC: 6 MG/DL (ref 6–20)
CALCIUM SERPL-MCNC: 9.6 MG/DL (ref 8.7–10.5)
CHLORIDE SERPL-SCNC: 102 MMOL/L (ref 95–110)
CO2 SERPL-SCNC: 30 MMOL/L (ref 23–29)
CREAT SERPL-MCNC: 0.5 MG/DL (ref 0.5–1.4)
DIFFERENTIAL METHOD BLD: ABNORMAL
EOSINOPHIL # BLD AUTO: 0.2 K/UL (ref 0–0.5)
EOSINOPHIL NFR BLD: 1.2 % (ref 0–8)
ERYTHROCYTE [DISTWIDTH] IN BLOOD BY AUTOMATED COUNT: 19.4 % (ref 11.5–14.5)
EST. GFR  (NO RACE VARIABLE): >60 ML/MIN/1.73 M^2
GLUCOSE SERPL-MCNC: 103 MG/DL (ref 70–110)
HCT VFR BLD AUTO: 21.9 % (ref 37–48.5)
HGB BLD-MCNC: 7.4 G/DL (ref 12–16)
IMM GRANULOCYTES # BLD AUTO: 0.16 K/UL (ref 0–0.04)
IMM GRANULOCYTES NFR BLD AUTO: 1 % (ref 0–0.5)
LYMPHOCYTES # BLD AUTO: 2.6 K/UL (ref 1–4.8)
LYMPHOCYTES NFR BLD: 17 % (ref 18–48)
MAGNESIUM SERPL-MCNC: 1.6 MG/DL (ref 1.6–2.6)
MCH RBC QN AUTO: 31.6 PG (ref 27–31)
MCHC RBC AUTO-ENTMCNC: 33.8 G/DL (ref 32–36)
MCV RBC AUTO: 94 FL (ref 82–98)
MONOCYTES # BLD AUTO: 1.5 K/UL (ref 0.3–1)
MONOCYTES NFR BLD: 9.6 % (ref 4–15)
NEUTROPHILS # BLD AUTO: 10.9 K/UL (ref 1.8–7.7)
NEUTROPHILS NFR BLD: 70.8 % (ref 38–73)
NRBC BLD-RTO: 2 /100 WBC
PLATELET # BLD AUTO: 409 K/UL (ref 150–450)
PMV BLD AUTO: 10.3 FL (ref 9.2–12.9)
POTASSIUM SERPL-SCNC: 4.3 MMOL/L (ref 3.5–5.1)
PROT SERPL-MCNC: 7.7 G/DL (ref 6–8.4)
RBC # BLD AUTO: 2.34 M/UL (ref 4–5.4)
RETICS/RBC NFR AUTO: 6.7 % (ref 0.5–2.5)
SODIUM SERPL-SCNC: 136 MMOL/L (ref 136–145)
WBC # BLD AUTO: 15.44 K/UL (ref 3.9–12.7)

## 2024-03-27 PROCEDURE — 85025 COMPLETE CBC W/AUTO DIFF WBC: CPT | Performed by: INTERNAL MEDICINE

## 2024-03-27 PROCEDURE — 99900031 HC PATIENT EDUCATION (STAT)

## 2024-03-27 PROCEDURE — 94799 UNLISTED PULMONARY SVC/PX: CPT

## 2024-03-27 PROCEDURE — 25000003 PHARM REV CODE 250: Performed by: INTERNAL MEDICINE

## 2024-03-27 PROCEDURE — 99900035 HC TECH TIME PER 15 MIN (STAT)

## 2024-03-27 PROCEDURE — 85045 AUTOMATED RETICULOCYTE COUNT: CPT | Performed by: INTERNAL MEDICINE

## 2024-03-27 PROCEDURE — 63600175 PHARM REV CODE 636 W HCPCS: Performed by: NURSE PRACTITIONER

## 2024-03-27 PROCEDURE — 94761 N-INVAS EAR/PLS OXIMETRY MLT: CPT

## 2024-03-27 PROCEDURE — 80053 COMPREHEN METABOLIC PANEL: CPT | Performed by: INTERNAL MEDICINE

## 2024-03-27 PROCEDURE — 94799 UNLISTED PULMONARY SVC/PX: CPT | Mod: XB

## 2024-03-27 PROCEDURE — 25000003 PHARM REV CODE 250: Performed by: NURSE PRACTITIONER

## 2024-03-27 PROCEDURE — 83735 ASSAY OF MAGNESIUM: CPT | Performed by: INTERNAL MEDICINE

## 2024-03-27 PROCEDURE — 36415 COLL VENOUS BLD VENIPUNCTURE: CPT | Performed by: INTERNAL MEDICINE

## 2024-03-27 RX ORDER — POLYETHYLENE GLYCOL 3350 17 G/17G
17 POWDER, FOR SOLUTION ORAL DAILY
Refills: 0
Start: 2024-03-28

## 2024-03-27 RX ORDER — LORAZEPAM 2 MG/ML
1 INJECTION INTRAMUSCULAR ONCE
Status: DISCONTINUED | OUTPATIENT
Start: 2024-03-27 | End: 2024-03-27 | Stop reason: HOSPADM

## 2024-03-27 RX ADMIN — OXYCODONE HYDROCHLORIDE 15 MG: 5 TABLET ORAL at 01:03

## 2024-03-27 RX ADMIN — Medication 800 MG: at 11:03

## 2024-03-27 RX ADMIN — MORPHINE SULFATE 30 MG: 15 TABLET, EXTENDED RELEASE ORAL at 05:03

## 2024-03-27 RX ADMIN — HYDROMORPHONE HYDROCHLORIDE 2 MG: 1 INJECTION, SOLUTION INTRAMUSCULAR; INTRAVENOUS; SUBCUTANEOUS at 02:03

## 2024-03-27 RX ADMIN — HYDROMORPHONE HYDROCHLORIDE 2 MG: 1 INJECTION, SOLUTION INTRAMUSCULAR; INTRAVENOUS; SUBCUTANEOUS at 04:03

## 2024-03-27 RX ADMIN — MORPHINE SULFATE 30 MG: 15 TABLET, EXTENDED RELEASE ORAL at 02:03

## 2024-03-27 RX ADMIN — HYDROMORPHONE HYDROCHLORIDE 2 MG: 1 INJECTION, SOLUTION INTRAMUSCULAR; INTRAVENOUS; SUBCUTANEOUS at 01:03

## 2024-03-27 RX ADMIN — HYDROXYUREA 1000 MG: 500 CAPSULE ORAL at 08:03

## 2024-03-27 RX ADMIN — FOLIC ACID 1 MG: 1 TABLET ORAL at 08:03

## 2024-03-27 RX ADMIN — OXYCODONE HYDROCHLORIDE 15 MG: 5 TABLET ORAL at 08:03

## 2024-03-27 RX ADMIN — HYDROMORPHONE HYDROCHLORIDE 2 MG: 1 INJECTION, SOLUTION INTRAMUSCULAR; INTRAVENOUS; SUBCUTANEOUS at 10:03

## 2024-03-27 RX ADMIN — Medication 800 MG: at 08:03

## 2024-03-27 RX ADMIN — MULTIVITAMIN TABLET 1 TABLET: TABLET at 08:03

## 2024-03-27 NOTE — DISCHARGE SUMMARY
[facility]  Discharge Summary      Patient Name: Sunita Mendez  MRN: [id number]  AMIRA: [id number]  Patient Class: IP- Inpatient  Admission Date: 3/23/2024  Hospital Length of Stay: 3 days  Discharge Date and Time:  03/27/2024 3:47 PM  Attending Physician: Eric Ma Jr., MD   Discharging Provider: Jacki Klein NP  Primary Care Provider: Sebastián Chambers MD    Primary Care Team: Networked reference to record PCT     HPI:   This is a 47 y/o  female w/ pmh of SCD, anxiety who p/w CC of pain in her hips, buttocks, legs worsening over the past day. Patient rates pain as 10/10 in severity. Patient denies headache, blurry vision, chest pain, SOB, abdominal pain, N/V and dysuria at this time. Patient is afebrile, hemodynamically stable and saturating well on RA.   CXR manifests findings suggestive of mild vascular overload, and no other acute cardiopulmonary disease. Bilateral hip & pelvix x-ray showed normal views of both hips as well as the pelvis.     * No surgery found *      Hospital Course:   Patient was monitored closely during her hospital stay.  She required IV Dilaudid and IV fluid hydration for acute sickle cell crisis.  She was experiencing significant pain to her bilateral hips and has history of avascular necrosis and MRI bilateral hips ordered for evaluation.  She was unable to tolerate an MRI after 2 attempts and this will be done outpatient. Her CBC was trended and remained low but stable and at patient's baseline.  Her white blood cells were noted to increase to 15,000, and she had no overt symptoms of infection.  She was noted to have fluctuations in her white blood cells, possibly due to her sickle cell disease.  She has an appointment set up with the PCP and she will keep this appointment.  She was noted to have constipation which resolved with her laxative.  Her sickle cell crisis has improved, and her pain has been well-controlled.  She is being  discharged to home today in stable condition.  A UA was ordered however she was unable to obtain this prior to going home and her WBC should be rechecked with her next outpatient labs.  She will follow up with her PCP and hematologist.  She did not have any adverse events while here.     Goals of Care Treatment Preferences:  Code Status: Full Code      Consults:     No new Assessment & Plan notes have been filed under this hospital service since the last note was generated.  Service: Hospital Medicine    Final Active Diagnoses:    Diagnosis Date Noted POA    PRINCIPAL PROBLEM:  Sickle cell disease Vasocclusive Crisis [D57.1] 03/24/2024 Yes    Leukocytosis [D72.829] 03/27/2024 No    Anxiety [F41.9] 04/01/2023 Yes     Chronic    Avascular necrosis [M87.00] 03/14/2019 Yes      Problems Resolved During this Admission:       Discharged Condition: stable    Disposition: Home or Self Care    Follow Up:   Follow-up Information       Sebastián Chambers MD Follow up.    Specialty: Internal Medicine  Why: Keep next scheduled appointment  Contact information:  [address] [place] [address] [address]  [facility] [address]  [phone]                           Patient Instructions:      Diet Adult Regular     Notify your health care provider if you experience any of the following:  temperature >100.4     Notify your health care provider if you experience any of the following:  persistent nausea and vomiting or diarrhea     Notify your health care provider if you experience any of the following:  severe uncontrolled pain     Notify your health care provider if you experience any of the following:  difficulty breathing or increased cough     Activity as tolerated       Significant Diagnostic Studies: Labs: CMP   Recent Labs   Lab 03/26/24  0502 03/27/24  0540    136   K 4.9 4.3    102   CO2 28 30*   GLU 98 103   BUN 7 6   CREATININE 0.4* 0.5   CALCIUM 8.8 9.6   PROT 7.0 7.7   ALBUMIN 3.7 4.1   BILITOT 1.9* 2.4*   ALKPHOS 76 81    AST 33 35   ALT 11 14   ANIONGAP 4* 4*   , CBC   Recent Labs   Lab 03/26/24  0502 03/27/24  0540   WBC 9.65 15.44*   HGB 6.7* 7.4*   HCT 20.6* 21.9*    409     Component Ref Range & Units 03/27/24 0540   Retic 0.5 - 2.5 % 6.7 High          All labs within the past 24 hours have been reviewed  Microbiology:   Microbiology Results (last 7 days)       ** No results found for the last 168 hours. **            Radiology: X-Ray Hips Bilateral 2 View Incl AP Pelvis  XR HIP 5 OR MORE VIEWS BILATERAL    COMPARISONS: None.    ADDITIONAL PERTINENT HISTORY: Pain    FINDINGS:    Osseous structures: Negative.    Joint spaces: Negative.    Surrounding soft tissues: Significant increase in stool along the entirety of the colon with no underlying obstruction.    IMPRESSION:  1. Normal views of both hips as well as the pelvis.  2. Underlying severe constipation.    Electronically signed by:  Neville David MD  03/24/2024 01:59 AM CDT Workstation: [id number]  X-Ray Chest AP Portable  XR CHEST 1 VIEW    COMPARISONS: Single view chest dated December 23, 2023    ADDITIONAL PERTINENT HISTORY: Chest pain    FINDINGS:    Life-support:  None.    Cardiomediastinal silhouette:  Moderate cardiomegaly, stable from previous exam.    Pulmonary vasculature:  Vascular congestion without neida pulmonary edema.    Lung fields:  Negative.    Pleural spaces: Negative.    Osseous structures:  Negative.    Surrounding soft tissues:  Negative.    IMPRESSION:  1. Findings suggestive of mild vascular overload.  2. No other acute cardiopulmonary disease.    Electronically signed by:  Neville David MD  03/24/2024 01:58 AM CDT Workstation: [id number]        Pending Diagnostic Studies:       Procedure Component Value Units Date/Time    Haptoglobin [[id number]]     Order Status: Sent Lab Status: No result     Specimen: Blood            Medications:  Reconciled Home Medications:      Medication List        START taking these medications      polyethylene  glycol 17 gram Pwpk  Commonly known as: GLYCOLAX  Take 17 g by mouth once daily. Hold for diarrhea  Start taking on: March 28, 2024            CONTINUE taking these medications      ALPRAZolam 1 MG tablet  Commonly known as: XANAX  Take 1 mg by mouth nightly as needed for Anxiety.     amitriptyline 50 MG tablet  Commonly known as: ELAVIL  Take 1 tablet by mouth every evening.     ENDARI 5 gram Pwpk  Generic drug: glutamine (sickle cell)  Take 15 g by mouth 3 (three) times daily with meals.     folic acid 1 MG tablet  Commonly known as: FOLVITE  Take 1 tablet (1 mg total) by mouth once daily.     gabapentin 100 MG capsule  Commonly known as: NEURONTIN  Take 100 mg by mouth 3 (three) times daily.     hydroxyurea 500 mg Cap  Commonly known as: HYDREA  Take 1,000 mg by mouth once daily.     morphine 30 MG 12 hr tablet  Commonly known as: MS CONTIN  Take 30 mg by mouth 2 (two) times daily as needed for Pain.     ONE DAILY MULTIVITAMIN per tablet  Generic drug: multivitamin  Take 1 tablet by mouth once daily.     oxyCODONE 15 MG Tab  Commonly known as: ROXICODONE  Take 15 mg by mouth every 4 (four) hours as needed for Pain.     topiramate 100 MG tablet  Commonly known as: TOPAMAX  Take 1 tablet (100 mg total) by mouth 2 (two) times daily.     verapamiL 80 MG tablet  Commonly known as: CALAN  Take 1 tablet by mouth 3 (three) times daily.     zolpidem 10 mg Tab  Commonly known as: AMBIEN  Take 10 mg by mouth nightly as needed.            ASK your doctor about these medications      OZEMPIC 0.25 mg or 0.5 mg (2 mg/3 mL) pen injector  Generic drug: semaglutide  Inject 0.25-0.5 mg into the skin every 7 days.              Indwelling Lines/Drains at time of discharge:   Lines/Drains/Airways       None                   Time spent on the discharge of patient: 38 minutes         Jacki Klein NP  Department of Hospital Medicine  [facility]

## 2024-03-27 NOTE — NURSING
Patient discharged home. IV and tele box removed. Discharge instructions given and patient verbalized understanding. No further needs at this time.

## 2024-03-27 NOTE — PROGRESS NOTES
3/27/24  Attempted MRI Scan of bilateral hips. Patient Unable to tolerate procedure due to pain and anxiety. Patient was given pain medication before MRI Scan. RN Notified

## 2024-03-27 NOTE — PLAN OF CARE
DC orders and chart reviewed. No discharge needs noted.  Patient cleared for discharge from .    Patient is discharging to home.  Patient will receive MRI outpatient.  Per patient, she already has her follow up appointments set up.         03/27/24 1556   Final Note   Assessment Type Final Discharge Note   Anticipated Discharge Disposition Home   What phone number can be called within the next 1-3 days to see how you are doing after discharge? [phone]   Hospital Resources/Appts/Education Provided Provided patient/caregiver with written discharge plan information;Patient refused appointment set-up   Post-Acute Status   Discharge Delays None known at this time

## 2024-03-27 NOTE — PLAN OF CARE
Problem: Adult Inpatient Plan of Care  Goal: Plan of Care Review  Outcome: Ongoing, Progressing  Goal: Patient-Specific Goal (Individualized)  Outcome: Ongoing, Progressing  Goal: Absence of Hospital-Acquired Illness or Injury  Outcome: Ongoing, Progressing  Goal: Optimal Comfort and Wellbeing  Outcome: Ongoing, Progressing  Goal: Readiness for Transition of Care  Outcome: Ongoing, Progressing     Problem: Fluid Imbalance (Pneumonia)  Goal: Fluid Balance  Outcome: Ongoing, Progressing     Problem: Infection (Pneumonia)  Goal: Resolution of Infection Signs and Symptoms  Outcome: Ongoing, Progressing     Problem: Respiratory Compromise (Pneumonia)  Goal: Effective Oxygenation and Ventilation  Outcome: Ongoing, Progressing     Problem: Fall Injury Risk  Goal: Absence of Fall and Fall-Related Injury  Outcome: Ongoing, Progressing     Problem: Pain Chronic (Persistent)  Goal: Acceptable Pain Control and Functional Ability  Outcome: Ongoing, Progressing

## 2024-03-27 NOTE — CARE UPDATE
03/27/24 0856   Patient Assessment/Suction   Level of Consciousness (AVPU) alert   Respiratory Effort Unlabored   Expansion/Accessory Muscles/Retractions no use of accessory muscles   All Lung Fields Breath Sounds clear;diminished   Rhythm/Pattern, Respiratory depth regular;pattern regular   Cough Frequency no cough   PRE-TX-O2   Device (Oxygen Therapy) room air   SpO2 95 %   Pulse Oximetry Type Intermittent   $ Pulse Oximetry - Multiple Charge Pulse Oximetry - Multiple   Pulse 94   Aerosol Therapy   $ Aerosol Therapy Charges PRN treatment not required   Incentive Spirometer   $ Incentive Spirometer Charges done with encouragement   Administration (IS) instruction provided, initial   Number of Repetitions (IS) 10   Level Incentive Spirometer (mL) 1000   Patient Tolerance (IS) good   Education   $ Education Bronchodilator;15 min   Respiratory Evaluation   $ Care Plan Tech Time 15 min

## 2024-04-11 LAB
OHS QRS DURATION: 78 MS
OHS QTC CALCULATION: 417 MS

## 2024-10-11 ENCOUNTER — HOSPITAL ENCOUNTER (INPATIENT)
Facility: HOSPITAL | Age: 49
LOS: 9 days | Discharge: HOME | DRG: 812 | End: 2024-10-20
Attending: EMERGENCY MEDICINE | Admitting: INTERNAL MEDICINE
Payer: MEDICAID

## 2024-10-11 DIAGNOSIS — D57.00 SICKLE CELL PAIN CRISIS: Primary | ICD-10-CM

## 2024-10-11 DIAGNOSIS — R06.02 SOB (SHORTNESS OF BREATH): ICD-10-CM

## 2024-10-11 LAB
ALBUMIN SERPL BCP-MCNC: 4.4 G/DL (ref 3.5–5.2)
ALP SERPL-CCNC: 81 U/L (ref 55–135)
ALT SERPL W/O P-5'-P-CCNC: 9 U/L (ref 10–44)
ANION GAP SERPL CALC-SCNC: 6 MMOL/L (ref 8–16)
AST SERPL-CCNC: 25 U/L (ref 10–40)
BACTERIA #/AREA URNS HPF: ABNORMAL /HPF
BASOPHILS # BLD AUTO: 0.05 K/UL (ref 0–0.2)
BASOPHILS NFR BLD: 0.4 % (ref 0–1.9)
BILIRUB SERPL-MCNC: 2.4 MG/DL (ref 0.1–1)
BILIRUB UR QL STRIP: NEGATIVE
BNP SERPL-MCNC: 167 PG/ML (ref 0–99)
BUN SERPL-MCNC: 7 MG/DL (ref 6–20)
CALCIUM SERPL-MCNC: 9.2 MG/DL (ref 8.7–10.5)
CHLORIDE SERPL-SCNC: 101 MMOL/L (ref 95–110)
CLARITY UR: CLEAR
CO2 SERPL-SCNC: 30 MMOL/L (ref 23–29)
COLOR UR: YELLOW
CREAT SERPL-MCNC: 0.6 MG/DL (ref 0.5–1.4)
D DIMER PPP IA.FEU-MCNC: 21.07 MG/L FEU (ref 0–0.49)
DIFFERENTIAL METHOD BLD: ABNORMAL
EOSINOPHIL # BLD AUTO: 0.4 K/UL (ref 0–0.5)
EOSINOPHIL NFR BLD: 3.5 % (ref 0–8)
ERYTHROCYTE [DISTWIDTH] IN BLOOD BY AUTOMATED COUNT: 21.1 % (ref 11.5–14.5)
EST. GFR  (NO RACE VARIABLE): >60 ML/MIN/1.73 M^2
GLUCOSE SERPL-MCNC: 89 MG/DL (ref 70–110)
GLUCOSE UR QL STRIP: NEGATIVE
HCT VFR BLD AUTO: 24.8 % (ref 37–48.5)
HGB BLD-MCNC: 8.1 G/DL (ref 12–16)
HGB UR QL STRIP: ABNORMAL
HYALINE CASTS #/AREA URNS LPF: 2 /LPF
IMM GRANULOCYTES # BLD AUTO: 0.06 K/UL (ref 0–0.04)
IMM GRANULOCYTES NFR BLD AUTO: 0.5 % (ref 0–0.5)
INR PPP: 1.1 (ref 0.8–1.2)
KETONES UR QL STRIP: NEGATIVE
LEUKOCYTE ESTERASE UR QL STRIP: NEGATIVE
LYMPHOCYTES # BLD AUTO: 3.6 K/UL (ref 1–4.8)
LYMPHOCYTES NFR BLD: 30.6 % (ref 18–48)
MCH RBC QN AUTO: 30.7 PG (ref 27–31)
MCHC RBC AUTO-ENTMCNC: 32.7 G/DL (ref 32–36)
MCV RBC AUTO: 94 FL (ref 82–98)
MICROSCOPIC COMMENT: ABNORMAL
MONOCYTES # BLD AUTO: 1.2 K/UL (ref 0.3–1)
MONOCYTES NFR BLD: 10.7 % (ref 4–15)
NEUTROPHILS # BLD AUTO: 6.3 K/UL (ref 1.8–7.7)
NEUTROPHILS NFR BLD: 54.3 % (ref 38–73)
NITRITE UR QL STRIP: NEGATIVE
NRBC BLD-RTO: 3 /100 WBC
PH UR STRIP: 7 [PH] (ref 5–8)
PLATELET # BLD AUTO: 324 K/UL (ref 150–450)
PMV BLD AUTO: 10.1 FL (ref 9.2–12.9)
POTASSIUM SERPL-SCNC: 3.6 MMOL/L (ref 3.5–5.1)
PROT SERPL-MCNC: 8.4 G/DL (ref 6–8.4)
PROT UR QL STRIP: ABNORMAL
PROTHROMBIN TIME: 12 SEC (ref 9–12.5)
RBC # BLD AUTO: 2.64 M/UL (ref 4–5.4)
RBC #/AREA URNS HPF: 1 /HPF (ref 0–4)
RETICS/RBC NFR AUTO: 10.5 % (ref 0.5–2.5)
SODIUM SERPL-SCNC: 137 MMOL/L (ref 136–145)
SP GR UR STRIP: 1.01 (ref 1–1.03)
SQUAMOUS #/AREA URNS HPF: 0 /HPF
TROPONIN I SERPL HS-MCNC: 10.1 PG/ML (ref 0–14.9)
TROPONIN I SERPL HS-MCNC: 9.7 PG/ML (ref 0–14.9)
UNIDENT CRYS URNS QL MICRO: 2
URN SPEC COLLECT METH UR: ABNORMAL
UROBILINOGEN UR STRIP-ACNC: ABNORMAL EU/DL
WBC # BLD AUTO: 11.6 K/UL (ref 3.9–12.7)
WBC #/AREA URNS HPF: 1 /HPF (ref 0–5)

## 2024-10-11 PROCEDURE — 83880 ASSAY OF NATRIURETIC PEPTIDE: CPT | Performed by: EMERGENCY MEDICINE

## 2024-10-11 PROCEDURE — 93010 ELECTROCARDIOGRAM REPORT: CPT | Mod: ,,, | Performed by: INTERNAL MEDICINE

## 2024-10-11 PROCEDURE — 63600175 PHARM REV CODE 636 W HCPCS: Performed by: EMERGENCY MEDICINE

## 2024-10-11 PROCEDURE — 85379 FIBRIN DEGRADATION QUANT: CPT | Performed by: EMERGENCY MEDICINE

## 2024-10-11 PROCEDURE — 85025 COMPLETE CBC W/AUTO DIFF WBC: CPT | Performed by: EMERGENCY MEDICINE

## 2024-10-11 PROCEDURE — 85610 PROTHROMBIN TIME: CPT | Performed by: EMERGENCY MEDICINE

## 2024-10-11 PROCEDURE — 99285 EMERGENCY DEPT VISIT HI MDM: CPT | Mod: 25

## 2024-10-11 PROCEDURE — 96375 TX/PRO/DX INJ NEW DRUG ADDON: CPT

## 2024-10-11 PROCEDURE — 25000003 PHARM REV CODE 250: Performed by: EMERGENCY MEDICINE

## 2024-10-11 PROCEDURE — 96374 THER/PROPH/DIAG INJ IV PUSH: CPT

## 2024-10-11 PROCEDURE — 93005 ELECTROCARDIOGRAM TRACING: CPT | Performed by: INTERNAL MEDICINE

## 2024-10-11 PROCEDURE — 96376 TX/PRO/DX INJ SAME DRUG ADON: CPT

## 2024-10-11 PROCEDURE — 80053 COMPREHEN METABOLIC PANEL: CPT | Performed by: EMERGENCY MEDICINE

## 2024-10-11 PROCEDURE — 36415 COLL VENOUS BLD VENIPUNCTURE: CPT | Performed by: EMERGENCY MEDICINE

## 2024-10-11 PROCEDURE — 21400001 HC TELEMETRY ROOM

## 2024-10-11 PROCEDURE — 96361 HYDRATE IV INFUSION ADD-ON: CPT

## 2024-10-11 PROCEDURE — 85045 AUTOMATED RETICULOCYTE COUNT: CPT | Performed by: EMERGENCY MEDICINE

## 2024-10-11 PROCEDURE — 25500020 PHARM REV CODE 255: Performed by: EMERGENCY MEDICINE

## 2024-10-11 PROCEDURE — 81001 URINALYSIS AUTO W/SCOPE: CPT | Performed by: EMERGENCY MEDICINE

## 2024-10-11 PROCEDURE — 84484 ASSAY OF TROPONIN QUANT: CPT | Mod: 91 | Performed by: EMERGENCY MEDICINE

## 2024-10-11 RX ORDER — ONDANSETRON HYDROCHLORIDE 2 MG/ML
4 INJECTION, SOLUTION INTRAVENOUS
Status: COMPLETED | OUTPATIENT
Start: 2024-10-11 | End: 2024-10-11

## 2024-10-11 RX ORDER — HYDROMORPHONE HYDROCHLORIDE 1 MG/ML
2 INJECTION, SOLUTION INTRAMUSCULAR; INTRAVENOUS; SUBCUTANEOUS
Status: COMPLETED | OUTPATIENT
Start: 2024-10-11 | End: 2024-10-11

## 2024-10-11 RX ORDER — IODIXANOL 320 MG/ML
100 INJECTION, SOLUTION INTRAVASCULAR
Status: COMPLETED | OUTPATIENT
Start: 2024-10-11 | End: 2024-10-11

## 2024-10-11 RX ADMIN — HYDROMORPHONE HYDROCHLORIDE 2 MG: 1 INJECTION, SOLUTION INTRAMUSCULAR; INTRAVENOUS; SUBCUTANEOUS at 07:10

## 2024-10-11 RX ADMIN — HYDROMORPHONE HYDROCHLORIDE 2 MG: 1 INJECTION, SOLUTION INTRAMUSCULAR; INTRAVENOUS; SUBCUTANEOUS at 09:10

## 2024-10-11 RX ADMIN — IODIXANOL 100 ML: 320 INJECTION, SOLUTION INTRAVASCULAR at 09:10

## 2024-10-11 RX ADMIN — ONDANSETRON 4 MG: 2 INJECTION INTRAMUSCULAR; INTRAVENOUS at 09:10

## 2024-10-11 RX ADMIN — SODIUM CHLORIDE 1000 ML: 9 INJECTION, SOLUTION INTRAVENOUS at 07:10

## 2024-10-11 RX ADMIN — ONDANSETRON 4 MG: 2 INJECTION INTRAMUSCULAR; INTRAVENOUS at 07:10

## 2024-10-12 PROBLEM — G43.909 MIGRAINE: Status: RESOLVED | Noted: 2023-04-01 | Resolved: 2024-10-12

## 2024-10-12 PROBLEM — R10.12 LEFT UPPER QUADRANT ABDOMINAL PAIN: Status: ACTIVE | Noted: 2024-10-12

## 2024-10-12 PROBLEM — G43.909 MIGRAINE: Status: ACTIVE | Noted: 2024-10-12

## 2024-10-12 LAB
ANION GAP SERPL CALC-SCNC: 4 MMOL/L (ref 8–16)
BASOPHILS # BLD AUTO: 0.04 K/UL (ref 0–0.2)
BASOPHILS NFR BLD: 0.4 % (ref 0–1.9)
BUN SERPL-MCNC: 6 MG/DL (ref 6–20)
CALCIUM SERPL-MCNC: 8.6 MG/DL (ref 8.7–10.5)
CHLORIDE SERPL-SCNC: 105 MMOL/L (ref 95–110)
CO2 SERPL-SCNC: 30 MMOL/L (ref 23–29)
CREAT SERPL-MCNC: 0.5 MG/DL (ref 0.5–1.4)
DIFFERENTIAL METHOD BLD: ABNORMAL
EOSINOPHIL # BLD AUTO: 0.6 K/UL (ref 0–0.5)
EOSINOPHIL NFR BLD: 5.3 % (ref 0–8)
ERYTHROCYTE [DISTWIDTH] IN BLOOD BY AUTOMATED COUNT: 20.3 % (ref 11.5–14.5)
ERYTHROCYTE [DISTWIDTH] IN BLOOD BY AUTOMATED COUNT: 20.6 % (ref 11.5–14.5)
EST. GFR  (NO RACE VARIABLE): >60 ML/MIN/1.73 M^2
GLUCOSE SERPL-MCNC: 124 MG/DL (ref 70–110)
HCT VFR BLD AUTO: 23.2 % (ref 37–48.5)
HCT VFR BLD AUTO: 23.2 % (ref 37–48.5)
HGB BLD-MCNC: 7.6 G/DL (ref 12–16)
HGB BLD-MCNC: 7.7 G/DL (ref 12–16)
IMM GRANULOCYTES # BLD AUTO: 0.06 K/UL (ref 0–0.04)
IMM GRANULOCYTES NFR BLD AUTO: 0.6 % (ref 0–0.5)
LACTATE SERPL-SCNC: 0.7 MMOL/L (ref 0.5–1.9)
LDH SERPL L TO P-CCNC: 522 U/L (ref 110–260)
LYMPHOCYTES # BLD AUTO: 3.4 K/UL (ref 1–4.8)
LYMPHOCYTES NFR BLD: 32.6 % (ref 18–48)
MAGNESIUM SERPL-MCNC: 1.6 MG/DL (ref 1.6–2.6)
MCH RBC QN AUTO: 31.1 PG (ref 27–31)
MCH RBC QN AUTO: 31.7 PG (ref 27–31)
MCHC RBC AUTO-ENTMCNC: 32.8 G/DL (ref 32–36)
MCHC RBC AUTO-ENTMCNC: 33.2 G/DL (ref 32–36)
MCV RBC AUTO: 95 FL (ref 82–98)
MCV RBC AUTO: 96 FL (ref 82–98)
MONOCYTES # BLD AUTO: 1.2 K/UL (ref 0.3–1)
MONOCYTES NFR BLD: 11.3 % (ref 4–15)
NEUTROPHILS # BLD AUTO: 5.2 K/UL (ref 1.8–7.7)
NEUTROPHILS NFR BLD: 49.8 % (ref 38–73)
NRBC BLD-RTO: 2 /100 WBC
PHOSPHATE SERPL-MCNC: 4 MG/DL (ref 2.7–4.5)
PLATELET # BLD AUTO: 304 K/UL (ref 150–450)
PLATELET # BLD AUTO: 306 K/UL (ref 150–450)
PMV BLD AUTO: 10 FL (ref 9.2–12.9)
PMV BLD AUTO: 9.9 FL (ref 9.2–12.9)
POCT GLUCOSE: 109 MG/DL (ref 70–110)
POTASSIUM SERPL-SCNC: 3.7 MMOL/L (ref 3.5–5.1)
RBC # BLD AUTO: 2.43 M/UL (ref 4–5.4)
RBC # BLD AUTO: 2.44 M/UL (ref 4–5.4)
SODIUM SERPL-SCNC: 139 MMOL/L (ref 136–145)
WBC # BLD AUTO: 10.4 K/UL (ref 3.9–12.7)
WBC # BLD AUTO: 10.47 K/UL (ref 3.9–12.7)

## 2024-10-12 PROCEDURE — 85025 COMPLETE CBC W/AUTO DIFF WBC: CPT | Performed by: STUDENT IN AN ORGANIZED HEALTH CARE EDUCATION/TRAINING PROGRAM

## 2024-10-12 PROCEDURE — 80048 BASIC METABOLIC PNL TOTAL CA: CPT | Performed by: STUDENT IN AN ORGANIZED HEALTH CARE EDUCATION/TRAINING PROGRAM

## 2024-10-12 PROCEDURE — G0378 HOSPITAL OBSERVATION PER HR: HCPCS

## 2024-10-12 PROCEDURE — 63600175 PHARM REV CODE 636 W HCPCS: Performed by: NURSE PRACTITIONER

## 2024-10-12 PROCEDURE — 83615 LACTATE (LD) (LDH) ENZYME: CPT | Performed by: INTERNAL MEDICINE

## 2024-10-12 PROCEDURE — 25000003 PHARM REV CODE 250: Performed by: STUDENT IN AN ORGANIZED HEALTH CARE EDUCATION/TRAINING PROGRAM

## 2024-10-12 PROCEDURE — 83605 ASSAY OF LACTIC ACID: CPT | Performed by: STUDENT IN AN ORGANIZED HEALTH CARE EDUCATION/TRAINING PROGRAM

## 2024-10-12 PROCEDURE — 96376 TX/PRO/DX INJ SAME DRUG ADON: CPT

## 2024-10-12 PROCEDURE — 63600175 PHARM REV CODE 636 W HCPCS: Performed by: STUDENT IN AN ORGANIZED HEALTH CARE EDUCATION/TRAINING PROGRAM

## 2024-10-12 PROCEDURE — 83735 ASSAY OF MAGNESIUM: CPT | Performed by: STUDENT IN AN ORGANIZED HEALTH CARE EDUCATION/TRAINING PROGRAM

## 2024-10-12 PROCEDURE — 36415 COLL VENOUS BLD VENIPUNCTURE: CPT | Performed by: STUDENT IN AN ORGANIZED HEALTH CARE EDUCATION/TRAINING PROGRAM

## 2024-10-12 PROCEDURE — 84100 ASSAY OF PHOSPHORUS: CPT | Performed by: STUDENT IN AN ORGANIZED HEALTH CARE EDUCATION/TRAINING PROGRAM

## 2024-10-12 PROCEDURE — 96372 THER/PROPH/DIAG INJ SC/IM: CPT | Performed by: STUDENT IN AN ORGANIZED HEALTH CARE EDUCATION/TRAINING PROGRAM

## 2024-10-12 PROCEDURE — 21400001 HC TELEMETRY ROOM

## 2024-10-12 PROCEDURE — 63600175 PHARM REV CODE 636 W HCPCS: Performed by: EMERGENCY MEDICINE

## 2024-10-12 RX ORDER — NALOXONE HCL 0.4 MG/ML
0.02 VIAL (ML) INJECTION
Status: DISCONTINUED | OUTPATIENT
Start: 2024-10-12 | End: 2024-10-20 | Stop reason: HOSPADM

## 2024-10-12 RX ORDER — ZOLPIDEM TARTRATE 5 MG/1
10 TABLET ORAL NIGHTLY PRN
Status: DISCONTINUED | OUTPATIENT
Start: 2024-10-12 | End: 2024-10-20 | Stop reason: HOSPADM

## 2024-10-12 RX ORDER — SODIUM,POTASSIUM PHOSPHATES 280-250MG
2 POWDER IN PACKET (EA) ORAL
Status: DISCONTINUED | OUTPATIENT
Start: 2024-10-12 | End: 2024-10-20 | Stop reason: HOSPADM

## 2024-10-12 RX ORDER — L-GLUTAMINE 5 G/1
15 POWDER, FOR SOLUTION ORAL
Status: DISCONTINUED | OUTPATIENT
Start: 2024-10-12 | End: 2024-10-15

## 2024-10-12 RX ORDER — GABAPENTIN 100 MG/1
100 CAPSULE ORAL 3 TIMES DAILY
Status: DISCONTINUED | OUTPATIENT
Start: 2024-10-12 | End: 2024-10-20 | Stop reason: HOSPADM

## 2024-10-12 RX ORDER — HYDROMORPHONE HYDROCHLORIDE 1 MG/ML
2 INJECTION, SOLUTION INTRAMUSCULAR; INTRAVENOUS; SUBCUTANEOUS
Status: COMPLETED | OUTPATIENT
Start: 2024-10-12 | End: 2024-10-12

## 2024-10-12 RX ORDER — SODIUM CHLORIDE 0.9 % (FLUSH) 0.9 %
2 SYRINGE (ML) INJECTION EVERY 8 HOURS PRN
Status: DISCONTINUED | OUTPATIENT
Start: 2024-10-12 | End: 2024-10-20 | Stop reason: HOSPADM

## 2024-10-12 RX ORDER — ALPRAZOLAM 0.5 MG/1
1 TABLET ORAL 2 TIMES DAILY PRN
Status: DISCONTINUED | OUTPATIENT
Start: 2024-10-12 | End: 2024-10-14

## 2024-10-12 RX ORDER — GLUCAGON 1 MG
1 KIT INJECTION
Status: DISCONTINUED | OUTPATIENT
Start: 2024-10-12 | End: 2024-10-20 | Stop reason: HOSPADM

## 2024-10-12 RX ORDER — IBUPROFEN 200 MG
24 TABLET ORAL
Status: DISCONTINUED | OUTPATIENT
Start: 2024-10-12 | End: 2024-10-20 | Stop reason: HOSPADM

## 2024-10-12 RX ORDER — LANOLIN ALCOHOL/MO/W.PET/CERES
800 CREAM (GRAM) TOPICAL
Status: DISCONTINUED | OUTPATIENT
Start: 2024-10-12 | End: 2024-10-20 | Stop reason: HOSPADM

## 2024-10-12 RX ORDER — HYDROXYUREA 500 MG/1
1000 CAPSULE ORAL DAILY
Status: DISCONTINUED | OUTPATIENT
Start: 2024-10-12 | End: 2024-10-20 | Stop reason: HOSPADM

## 2024-10-12 RX ORDER — MORPHINE SULFATE 15 MG/1
30 TABLET, FILM COATED, EXTENDED RELEASE ORAL 2 TIMES DAILY PRN
Status: DISCONTINUED | OUTPATIENT
Start: 2024-10-12 | End: 2024-10-15

## 2024-10-12 RX ORDER — AMITRIPTYLINE HYDROCHLORIDE 25 MG/1
50 TABLET, FILM COATED ORAL NIGHTLY
Status: DISCONTINUED | OUTPATIENT
Start: 2024-10-12 | End: 2024-10-20 | Stop reason: HOSPADM

## 2024-10-12 RX ORDER — FOLIC ACID 1 MG/1
1 TABLET ORAL DAILY
Status: DISCONTINUED | OUTPATIENT
Start: 2024-10-12 | End: 2024-10-20 | Stop reason: HOSPADM

## 2024-10-12 RX ORDER — MORPHINE SULFATE 30 MG/1
30 TABLET, FILM COATED, EXTENDED RELEASE ORAL EVERY 8 HOURS PRN
COMMUNITY
Start: 2024-10-02

## 2024-10-12 RX ORDER — INSULIN ASPART 100 [IU]/ML
0-5 INJECTION, SOLUTION INTRAVENOUS; SUBCUTANEOUS
Status: DISCONTINUED | OUTPATIENT
Start: 2024-10-12 | End: 2024-10-20 | Stop reason: HOSPADM

## 2024-10-12 RX ORDER — ACETAMINOPHEN 325 MG/1
650 TABLET ORAL EVERY 4 HOURS PRN
Status: DISCONTINUED | OUTPATIENT
Start: 2024-10-12 | End: 2024-10-20 | Stop reason: HOSPADM

## 2024-10-12 RX ORDER — AMOXICILLIN 250 MG
1 CAPSULE ORAL 2 TIMES DAILY
Status: DISCONTINUED | OUTPATIENT
Start: 2024-10-12 | End: 2024-10-20 | Stop reason: HOSPADM

## 2024-10-12 RX ORDER — HYDROMORPHONE HYDROCHLORIDE 1 MG/ML
2 INJECTION, SOLUTION INTRAMUSCULAR; INTRAVENOUS; SUBCUTANEOUS
Status: DISCONTINUED | OUTPATIENT
Start: 2024-10-12 | End: 2024-10-14

## 2024-10-12 RX ORDER — HYDROMORPHONE HYDROCHLORIDE 1 MG/ML
2 INJECTION, SOLUTION INTRAMUSCULAR; INTRAVENOUS; SUBCUTANEOUS EVERY 4 HOURS PRN
Status: DISCONTINUED | OUTPATIENT
Start: 2024-10-12 | End: 2024-10-12

## 2024-10-12 RX ORDER — IBUPROFEN 200 MG
400 TABLET ORAL EVERY 6 HOURS PRN
Status: DISCONTINUED | OUTPATIENT
Start: 2024-10-12 | End: 2024-10-20 | Stop reason: HOSPADM

## 2024-10-12 RX ORDER — ALPRAZOLAM 0.5 MG/1
1 TABLET ORAL NIGHTLY PRN
Status: DISCONTINUED | OUTPATIENT
Start: 2024-10-12 | End: 2024-10-12

## 2024-10-12 RX ORDER — VERAPAMIL HYDROCHLORIDE 80 MG/1
80 TABLET ORAL 3 TIMES DAILY
Status: DISCONTINUED | OUTPATIENT
Start: 2024-10-12 | End: 2024-10-19

## 2024-10-12 RX ORDER — HYDROMORPHONE HYDROCHLORIDE 1 MG/ML
1 INJECTION, SOLUTION INTRAMUSCULAR; INTRAVENOUS; SUBCUTANEOUS EVERY 4 HOURS PRN
Status: DISCONTINUED | OUTPATIENT
Start: 2024-10-12 | End: 2024-10-12

## 2024-10-12 RX ORDER — SEMAGLUTIDE 1.34 MG/ML
1 INJECTION, SOLUTION SUBCUTANEOUS
Status: ON HOLD | COMMUNITY
Start: 2024-08-03 | End: 2024-10-20 | Stop reason: HOSPADM

## 2024-10-12 RX ORDER — ENOXAPARIN SODIUM 100 MG/ML
40 INJECTION SUBCUTANEOUS EVERY 24 HOURS
Status: DISCONTINUED | OUTPATIENT
Start: 2024-10-12 | End: 2024-10-20 | Stop reason: HOSPADM

## 2024-10-12 RX ORDER — IBUPROFEN 200 MG
16 TABLET ORAL
Status: DISCONTINUED | OUTPATIENT
Start: 2024-10-12 | End: 2024-10-20 | Stop reason: HOSPADM

## 2024-10-12 RX ORDER — SODIUM CHLORIDE 9 MG/ML
INJECTION, SOLUTION INTRAVENOUS CONTINUOUS
Status: DISCONTINUED | OUTPATIENT
Start: 2024-10-12 | End: 2024-10-15

## 2024-10-12 RX ORDER — ONDANSETRON HYDROCHLORIDE 2 MG/ML
4 INJECTION, SOLUTION INTRAVENOUS EVERY 8 HOURS PRN
Status: DISCONTINUED | OUTPATIENT
Start: 2024-10-12 | End: 2024-10-20 | Stop reason: HOSPADM

## 2024-10-12 RX ADMIN — HYDROMORPHONE HYDROCHLORIDE 2 MG: 1 INJECTION, SOLUTION INTRAMUSCULAR; INTRAVENOUS; SUBCUTANEOUS at 05:10

## 2024-10-12 RX ADMIN — HYDROXYUREA 1000 MG: 500 CAPSULE ORAL at 08:10

## 2024-10-12 RX ADMIN — Medication 800 MG: at 02:10

## 2024-10-12 RX ADMIN — HYDROMORPHONE HYDROCHLORIDE 2 MG: 1 INJECTION, SOLUTION INTRAMUSCULAR; INTRAVENOUS; SUBCUTANEOUS at 07:10

## 2024-10-12 RX ADMIN — SENNOSIDES AND DOCUSATE SODIUM 1 TABLET: 8.6; 5 TABLET ORAL at 08:10

## 2024-10-12 RX ADMIN — FOLIC ACID 1 MG: 1 TABLET ORAL at 08:10

## 2024-10-12 RX ADMIN — HYDROMORPHONE HYDROCHLORIDE 2 MG: 1 INJECTION, SOLUTION INTRAMUSCULAR; INTRAVENOUS; SUBCUTANEOUS at 01:10

## 2024-10-12 RX ADMIN — HYDROMORPHONE HYDROCHLORIDE 2 MG: 1 INJECTION, SOLUTION INTRAMUSCULAR; INTRAVENOUS; SUBCUTANEOUS at 10:10

## 2024-10-12 RX ADMIN — GABAPENTIN 100 MG: 100 CAPSULE ORAL at 08:10

## 2024-10-12 RX ADMIN — VERAPAMIL HYDROCHLORIDE 80 MG: 80 TABLET ORAL at 08:10

## 2024-10-12 RX ADMIN — IBUPROFEN 400 MG: 200 TABLET, FILM COATED ORAL at 02:10

## 2024-10-12 RX ADMIN — ENOXAPARIN SODIUM 40 MG: 40 INJECTION SUBCUTANEOUS at 04:10

## 2024-10-12 RX ADMIN — HYDROMORPHONE HYDROCHLORIDE 2 MG: 1 INJECTION, SOLUTION INTRAMUSCULAR; INTRAVENOUS; SUBCUTANEOUS at 08:10

## 2024-10-12 RX ADMIN — MORPHINE SULFATE 30 MG: 15 TABLET, FILM COATED, EXTENDED RELEASE ORAL at 06:10

## 2024-10-12 RX ADMIN — SODIUM CHLORIDE: 9 INJECTION, SOLUTION INTRAVENOUS at 05:10

## 2024-10-12 RX ADMIN — HYDROMORPHONE HYDROCHLORIDE 1 MG: 1 INJECTION, SOLUTION INTRAMUSCULAR; INTRAVENOUS; SUBCUTANEOUS at 05:10

## 2024-10-12 RX ADMIN — VERAPAMIL HYDROCHLORIDE 80 MG: 80 TABLET ORAL at 02:10

## 2024-10-12 RX ADMIN — Medication 800 MG: at 11:10

## 2024-10-12 RX ADMIN — SODIUM CHLORIDE: 9 INJECTION, SOLUTION INTRAVENOUS at 06:10

## 2024-10-12 RX ADMIN — GABAPENTIN 100 MG: 100 CAPSULE ORAL at 02:10

## 2024-10-12 RX ADMIN — AMITRIPTYLINE HYDROCHLORIDE 50 MG: 25 TABLET, FILM COATED ORAL at 08:10

## 2024-10-12 RX ADMIN — POTASSIUM BICARBONATE 50 MEQ: 978 TABLET, EFFERVESCENT ORAL at 11:10

## 2024-10-12 RX ADMIN — ALPRAZOLAM 1 MG: 0.5 TABLET ORAL at 02:10

## 2024-10-12 RX ADMIN — THERA TABS 1 TABLET: TAB at 08:10

## 2024-10-12 NOTE — HOSPITAL COURSE
48 year old female with history of sickle cell admitted for pain crisis and migraine. Given IV and PO narcotic pain meds as well as IVF. CTA of chest without PE, D-dimer 21.  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  General surgery consulted, no intervention warranted recommends follow up for vaccines with Infectious Disease or PCP.  Ultrasound of bilateral lower extremities without DVT.  Hgb decreased to 6.5 so given 1u pRBC 10/15.  Given migraine cocktail with Compazine, Benadryl, Toradol. Due to low BP, verapamil was discontinued.  Recommended to repeat lab work at discharge.  Encouraged to follow-up with PCP

## 2024-10-12 NOTE — ASSESSMENT & PLAN NOTE
CTA chest is negative.  Patient is not currently on anticoagulation.  D dimer is 21 - suspect chronically elevated secondary to sickle cell.  BLE doppler us negative   Monitor respiratory status

## 2024-10-12 NOTE — HPI
48 year old female with PMH of sickle cell anemia, bilateral PE in 2010 status post 3 months of Lovenox presents to ED due to generalized pain which has been persistent for 2 weeks.  Patient has been taking her home dose of MS Contin and oxycodone without significant improvement.  Also reports intermittent chest pain 2 days ago which has resolved but had persistent shortness of breath and dry cough.   Denies lower extremity edema, fevers, chills, dysuria.  Reports some nausea but no vomiting.  Patient also reports having a headache consistent with her migraines which is usually exacerbated by sickle cell pain crisis.  Patient also reports having vague abdominal pain for over 1 year.  Describes pain is different from her usual sickle cell pain, dull in nature, is generalized and shifting - currently in the LUQ.  Patient was supposed to have a CT abdomen completed by her primary care doctor however her insurance rejected the test.  Labs revealed a D-dimer of 21.07. CTA chest was negative for PE or consolidation, revealed auto infarcted spleen.  Hemoglobin is 8.1 (patient reports hemoglobin is usually in the 7's).  Patient was placed on nasal cannula, received IV fluids and hydromorphone injections with improvement of pain.

## 2024-10-12 NOTE — PLAN OF CARE
[facility]  Initial Discharge Assessment       Primary Care Provider: Sebastián Chambers MD    Admission Diagnosis: Sickle cell pain crisis [D57.00]    Admission Date: 10/11/2024  Expected Discharge Date:     Transition of Care Barriers: None    Payor: MEDICAID / Plan: [employer] / Product Type: Managed Medicaid /     Extended Emergency Contact Information  Primary Emergency Contact: Alisa Carty  Mobile Phone: [phone]  Relation: Mother  Preferred language: English   needed? No    Discharge Plan A: Home  Discharge Plan B: Home with family      [id number] - [place] - [address]  [address] [address]  Phone: [phone] Fax: [phone]    SW met with patient at bedside to complete discharge planning assessment.  Patient alert and oriented xs 4.  Patient verified all demographic information on facesheet is correct.  Patient verified PCP is Dr. Chambers.  Patient verified [place] health [place] [employer] [place]  Patient with NO home health or DME.  Patient with NO POA or Living Will.  Patient not on dialysis or medication coumadin.  Patient with no 30 day admission.  Patient with no financial issues at this time.  Patient will provide transportation upon discharge from facility.  Patient independent with ADLs, live with 1 adult child and 2 minor children, drives self.      Initial Assessment (most recent)       Adult Discharge Assessment - 10/12/24 1226          Discharge Assessment    Assessment Type Discharge Planning Assessment     Confirmed/corrected address, phone number and insurance Yes     Confirmed Demographics Correct on Facesheet     Source of Information patient     Does patient/caregiver understand observation status Yes     Communicated CORRINE with patient/caregiver Yes     People in Home child(leonela), adult;child(leonela), dependent     Facility Arrived  From: home     Do you expect to return to your current living situation? Yes     Do you have help at home or someone to help you manage your care at home? Yes     Who are your caregiver(s) and their phone number(s)? self     Prior to hospitilization cognitive status: Alert/Oriented     Current cognitive status: Alert/Oriented     Walking or Climbing Stairs Difficulty no     Dressing/Bathing Difficulty no     Equipment Currently Used at Home none     Readmission within 30 days? No     Patient currently being followed by outpatient case management? No     Do you currently have service(s) that help you manage your care at home? No     Do you take prescription medications? Yes     Do you have prescription coverage? Yes     Do you have any problems affording any of your prescribed medications? No     Is the patient taking medications as prescribed? yes     Who is going to help you get home at discharge? self     How do you get to doctors appointments? car, drives self     Are you on dialysis? No     Discharge Plan A Home     Discharge Plan B Home with family     DME Needed Upon Discharge  none     Discharge Plan discussed with: Patient     Transition of Care Barriers None

## 2024-10-12 NOTE — ASSESSMENT & PLAN NOTE
Patient presented with generalized pain for 2 weeks which was unresponsive to her routine pain regimen.  Pain has resolved with IV Dilaudid    Resume home dose MS contin and oxycodone  Dilaudid IV 2mg q2h PRN for breakthrough pain  Continue supplemental oxygen - wean as tolerated  Continue IV hydration  Continue hydroxyurea, glutamine w/ meals  Continue gabapentin, folic acid

## 2024-10-12 NOTE — ED PROVIDER NOTES
Encounter Date: 10/11/2024       History     Chief Complaint   Patient presents with    Sickle Cell Pain Crisis     Pt states she is having pain d/t a sickle cell crisis for 2 weeks     HPI  Pt w/ PMHx sickle cell anemia (HbSS), bilateral PE in 2010 after prolonged hospitalization (completed 3 months of Lovenox) presents to ED with generalized pain that has been persistent for over 2 weeks.  She is taking home MS Contin and oxycodone without any significant improvement.  She reports intermittent chest pain 2 days ago that has now resolved but has also had some persistent shortness of breath associated with a dry cough.  Denies orthopnea or lower extremity edema.  She denies any known fever or chills.  She has had some nausea but no vomiting.  Denies bowel changes or urinary complaints.  She does note that she has migraine headache which she believes is exacerbated by her sickle cell pain.  She has not had a hospitalization since March.  She can not identify any potential triggers for this crisis.  She is concerned that she could have another PE given her shortness of breath symptoms.    Review of patient's allergies indicates:  No Known Allergies  Past Medical History:   Diagnosis Date    Anticoagulant long-term use      History reviewed. No pertinent surgical history.  No family history on file.  Social History     Tobacco Use    Smoking status: Never    Smokeless tobacco: Never   Substance Use Topics    Alcohol use: Not Currently    Drug use: Not Currently     Review of Systems   Constitutional:  Positive for activity change. Negative for fever.   HENT:  Negative for sore throat.    Respiratory:  Positive for shortness of breath.    Cardiovascular:  Positive for chest pain. Negative for leg swelling.   Gastrointestinal:  Negative for nausea.   Genitourinary:  Negative for dysuria.   Musculoskeletal:  Negative for back pain.   Skin:  Negative for rash.   Neurological:  Negative for weakness.   Hematological:  Does  not bruise/bleed easily.       Physical Exam     Initial Vitals [10/11/24 1759]   BP Pulse Resp Temp SpO2   123/84 84 18 99.2 °F (37.3 °C) 99 %      MAP       --         Physical Exam    Nursing note and vitals reviewed.  Constitutional: She appears well-developed and well-nourished. No distress.   HENT:   Head: Normocephalic and atraumatic. Mouth/Throat: Oropharynx is clear and moist.   Eyes: EOM are normal. Pupils are equal, round, and reactive to light.   Neck: Neck supple.   Normal range of motion.  Cardiovascular:  Normal rate and regular rhythm.           Pulmonary/Chest: Breath sounds normal. No respiratory distress.   Abdominal: Abdomen is soft. There is no abdominal tenderness. There is no rebound and no guarding.   Musculoskeletal:         General: No tenderness or edema. Normal range of motion.      Cervical back: Normal range of motion and neck supple.     Neurological: She is alert and oriented to person, place, and time. She has normal strength. No cranial nerve deficit. GCS score is 15. GCS eye subscore is 4. GCS verbal subscore is 5. GCS motor subscore is 6.   Skin: Skin is warm and dry. Capillary refill takes less than 2 seconds. No rash noted.   Psychiatric: She has a normal mood and affect. Thought content normal.         ED Course   Procedures  Labs Reviewed   CBC W/ AUTO DIFFERENTIAL - Abnormal       Result Value    WBC 11.60      RBC 2.64 (*)     Hemoglobin 8.1 (*)     Hematocrit 24.8 (*)     MCV 94      MCH 30.7      MCHC 32.7      RDW 21.1 (*)     Platelets 324      MPV 10.1      Immature Granulocytes 0.5      Gran # (ANC) 6.3      Immature Grans (Abs) 0.06 (*)     Lymph # 3.6      Mono # 1.2 (*)     Eos # 0.4      Baso # 0.05      nRBC 3 (*)     Gran % 54.3      Lymph % 30.6      Mono % 10.7      Eosinophil % 3.5      Basophil % 0.4      Differential Method Automated     COMPREHENSIVE METABOLIC PANEL - Abnormal    Sodium 137      Potassium 3.6      Chloride 101      CO2 30 (*)     Glucose 89       BUN 7      Creatinine 0.6      Calcium 9.2      Total Protein 8.4      Albumin 4.4      Total Bilirubin 2.4 (*)     Alkaline Phosphatase 81      AST 25      ALT 9 (*)     eGFR >60.0      Anion Gap 6 (*)    RETICULOCYTES - Abnormal    Retic 10.5 (*)    URINALYSIS, REFLEX TO URINE CULTURE - Abnormal    Specimen UA Urine, Clean Catch      Color, UA Yellow      Appearance, UA Clear      pH, UA 7.0      Specific Gravity, UA 1.010      Protein, UA 1+ (*)     Glucose, UA Negative      Ketones, UA Negative      Bilirubin (UA) Negative      Occult Blood UA 1+ (*)     Nitrite, UA Negative      Urobilinogen, UA 2.0-3.0 (*)     Leukocytes, UA Negative      Narrative:     Specimen Source->Urine   D DIMER, QUANTITATIVE - Abnormal    D-Dimer 21.07 (*)     Narrative:      DDimer critical result(s) repeated. Called and verbal readback   obtained from ASHLEY KHOURY Rn by AS2 10/11/2024 20:32   B-TYPE NATRIURETIC PEPTIDE - Abnormal     (*)    URINALYSIS MICROSCOPIC - Abnormal    RBC, UA 1      WBC, UA 1      Bacteria Rare      Squam Epithel, UA 0      Hyaline Casts, UA 2 (*)     Unclass Tricia UA 2      Microscopic Comment SEE COMMENT      Narrative:     Specimen Source->Urine   B-TYPE NATRIURETIC PEPTIDE   TROPONIN I HIGH SENSITIVITY   PROTIME-INR    Prothrombin Time 12.0      INR 1.1     TROPONIN I HIGH SENSITIVITY    Troponin I High Sensitivity 10.1     TROPONIN I HIGH SENSITIVITY    Troponin I High Sensitivity 9.7       EKG Readings: (Independently Interpreted)   NSR, HR 80, nonspecific T waves abnormalities.  No STEMI.  No significant change from prior.       ECG Results              EKG 12-lead (In process)        Collection Time Result Time QRS Duration OHS QTC Calculation    10/11/24 21:17:02 10/12/24 05:21:21 70 357                     In process by Interface, Lab In [facility] (10/12/24 05:21:24)                   Narrative:    Test Reason : R06.02,    Vent. Rate : 080 BPM     Atrial Rate : 080 BPM     P-R Int : 148  ms          QRS Dur : 070 ms      QT Int : 310 ms       P-R-T Axes : 064 031 -78 degrees     QTc Int : 357 ms    Normal sinus rhythm  Nonspecific T wave abnormality  Abnormal ECG  When compared with ECG of 23-MAR-2024 22:48,  QT has shortened    Referred By: [email or web address]   SELF           Confirmed By:                                   Imaging Results              CTA Chest Non-Coronary (PE Studies) (Final result)  Result time 10/11/24 21:28:25      Final result by Kei Zamora MD (10/11/24 21:28:25)                   Impression:      No central or segmental pulmonary embolus.    Other findings above.      Electronically signed by: Kei Zamora  Date:    10/11/2024  Time:    21:28               Narrative:    EXAMINATION:  CTA CHEST NON CORONARY (PE STUDIES)    CLINICAL HISTORY:  Pulmonary embolism (PE) suspected, positive D-dimer;    TECHNIQUE:  Low dose axial images, sagittal and coronal reformations were obtained from the thoracic inlet to the lung bases following the IV administration of 100 mL of Omnipaque 350.  Contrast timing was optimized to evaluate the pulmonary arteries.  MIP images were performed.    COMPARISON:  Same day chest radiograph    FINDINGS:  Base of Neck: No significant abnormality.    Thoracic soft tissues: Unremarkable.    Aorta: Left-sided aortic arch.  No aneurysm and no significant atherosclerosis    Heart: Normal size. No effusion.    Pulmonary vasculature: No central or segmental pulmonary embolus.    [place]/Mediastinum: No pathologic blas enlargement.    Airways: Patent.    Lungs/Pleura: No focal consolidation or pleural effusion.  Mild bilateral multifocal atelectasis and/or scarring.    Esophagus: Unremarkable.    Upper Abdomen: Cholelithiasis.  Auto infarction of the spleen.    Bones: Osteonecrosis of the right humeral head.                                       X-Ray Chest 1 View (Final result)  Result time 10/11/24 21:35:31      Final result by Rhina Sams MD  (10/11/24 21:35:31)                   Impression:      No significant acute abnormality.  Is      Electronically signed by: Rhina Latrell  Date:    10/11/2024  Time:    21:35               Narrative:    EXAMINATION:  XR CHEST 1 VIEW    CLINICAL HISTORY:  Shortness of breath    TECHNIQUE:  Single frontal view of the chest was performed.    COMPARISON:  03/23/2024 chest x-ray    FINDINGS:  Cardiac silhouette is stable and nonenlarged.  There is mild prominence of the hilar vasculature.  Subtle lung base linear opacities are seen on prior exams and may represent atelectasis.  There is no consolidation or pleural effusion.  Osseous structures are intact.                                       Medications   amitriptyline tablet 50 mg (50 mg Oral Given 10/12/24 2006)   folic acid tablet 1 mg (1 mg Oral Given 10/12/24 0825)   gabapentin capsule 100 mg (100 mg Oral Given 10/12/24 2006)   glutamine (sickle cell) PwPk 15 g (15 g Oral Not Given 10/12/24 1715)   hydroxyurea capsule 1,000 mg (1,000 mg Oral Given 10/12/24 0824)   morphine 12 hr tablet 30 mg (30 mg Oral Given 10/12/24 0608)   multivitamin tablet (1 tablet Oral Given 10/12/24 0825)   oxyCODONE immediate release tablet 15 mg (has no administration in time range)   verapamiL tablet 80 mg (80 mg Oral Given 10/12/24 2007)   zolpidem tablet 10 mg (has no administration in time range)   sodium chloride 0.9% flush 2 mL (has no administration in time range)   senna-docusate 8.6-50 mg per tablet 1 tablet (1 tablet Oral Given 10/12/24 2006)   ibuprofen tablet 400 mg (400 mg Oral Given 10/12/24 1450)   acetaminophen tablet 650 mg ( Oral Canceled Entry 10/13/24 0158)   naloxone 0.4 mg/mL injection 0.02 mg (has no administration in time range)   magnesium oxide tablet 800 mg (800 mg Oral Given 10/12/24 1450)   magnesium oxide tablet 800 mg (has no administration in time range)   potassium, sodium phosphates 280-160-250 mg packet 2 packet (has no administration in time range)    potassium, sodium phosphates 280-160-250 mg packet 2 packet (has no administration in time range)   potassium, sodium phosphates 280-160-250 mg packet 2 packet (has no administration in time range)   glucose chewable tablet 16 g (has no administration in time range)   glucose chewable tablet 24 g (has no administration in time range)   dextrose 50% injection 12.5 g (has no administration in time range)   dextrose 50% injection 25 g (has no administration in time range)   glucagon (human recombinant) injection 1 mg (has no administration in time range)   enoxaparin injection 40 mg (40 mg Subcutaneous Given 10/12/24 1658)   potassium bicarbonate disintegrating tablet 50 mEq (50 mEq Oral Given 10/12/24 1107)   potassium bicarbonate disintegrating tablet 35 mEq (has no administration in time range)   potassium bicarbonate disintegrating tablet 60 mEq (has no administration in time range)   insulin aspart U-100 pen 0-5 Units (has no administration in time range)   ondansetron injection 4 mg (has no administration in time range)   0.9%  NaCl infusion ( Intravenous New Bag 10/12/24 1757)   HYDROmorphone injection 2 mg (2 mg Intravenous Given 10/13/24 0120)   ALPRAZolam tablet 1 mg (has no administration in time range)   HYDROmorphone injection 2 mg (2 mg Intravenous Given 10/11/24 1910)   ondansetron injection 4 mg (4 mg Intravenous Given 10/11/24 1910)   sodium chloride 0.9% bolus 1,000 mL 1,000 mL (0 mLs Intravenous Stopped 10/11/24 2157)   HYDROmorphone injection 2 mg (2 mg Intravenous Given 10/11/24 1956)   ondansetron injection 4 mg (4 mg Intravenous Given 10/11/24 2147)   iodixanoL (VISIPAQUE 320) injection 100 mL (100 mLs Intravenous Given 10/11/24 2101)   HYDROmorphone injection 2 mg (2 mg Intravenous Given 10/11/24 2147)   HYDROmorphone injection 2 mg (2 mg Intravenous Given 10/12/24 0152)     Medical Decision Making  Amount and/or Complexity of Data Reviewed  External Data Reviewed: notes.     Details: Reviewed  most recent clinic notes and hospital d/c summary from March.  Labs: ordered. Decision-making details documented in ED Course.  Radiology: ordered and independent interpretation performed. Decision-making details documented in ED Course.  ECG/medicine tests: ordered and independent interpretation performed. Decision-making details documented in ED Course.    Risk  Prescription drug management.  Decision regarding hospitalization.                   Pt presents to ED as above.  Vitals stable.  Differential includes but not limited to VOC pain crisis, acute chest, PE, CHF, infectious process.  EKG shows no evidence acute ischemia.  Basic labs reviewed by me and significant for normal WBC Count, stable Hb, bnp mildly elevated at 167, negative troponin, elevated Ddimer of 21.  CXR independently reviewed by me and negative for acute cardiopulm process per rad read.  In light of abnormal Ddimer and sx, CT PE protocol obtained which showed no PE or other acute findings per rads read.    Pt was given 3 doses IV Dilaudid 2mg, IV Zofran, 1L NS w/ failure to improve.  Will admit to hospitalist for further mgmt and w/u.                   Clinical Impression:  Final diagnoses:  [R06.02] SOB (shortness of breath)  [D57.00] Sickle cell pain crisis (Primary)          ED Disposition Condition    Observation Stable                Ladi Martinez MD  10/13/24 [id number]

## 2024-10-12 NOTE — ASSESSMENT & PLAN NOTE
Patient presented with generalized pain for 2 weeks which was unresponsive to her routine pain regimen.  Pain has resolved with IV Dilaudid    Resume home dose MS contin and oxycodone  Dilaudid IV 1mg PRN for breakthrough pain  Continue supplemental oxygen - wean as tolerated  Continue IV hydration  Continue hydroxyurea, glutamine w/ meals  Continue gabapentin, folic acid

## 2024-10-12 NOTE — PROGRESS NOTES
Critical access hospital Medicine  Progress Note    Patient Name: Sunita Mendez  MRN: [id number]  Patient Class: OP- Observation   Admission Date: 10/11/2024  Length of Stay: 0 days  Attending Physician: Valdemar Heller MD  Primary Care Provider: Sebastián Chambers MD        Subjective:     Principal Problem:Sickle cell crisis        HPI:  48 year old female with PMH of sickle cell anemia, bilateral PE in 2010 status post 3 months of Lovenox presents to ED due to generalized pain which has been persistent for 2 weeks.  Patient has been taking her home dose of MS Contin and oxycodone without significant improvement.  Also reports intermittent chest pain 2 days ago which has resolved but had persistent shortness of breath and dry cough.   Denies lower extremity edema, fevers, chills, dysuria.  Reports some nausea but no vomiting.  Patient also reports having a headache consistent with her migraines which is usually exacerbated by sickle cell pain crisis.  Patient also reports having vague abdominal pain for over 1 year.  Describes pain is different from her usual sickle cell pain, dull in nature, is generalized and shifting - currently in the LUQ.  Patient was supposed to have a CT abdomen completed by her primary care doctor however her insurance rejected the test.  Labs revealed a D-dimer of 21.07. CTA chest was negative for PE or consolidation, revealed auto infarcted spleen.  Hemoglobin is 8.1 (patient reports hemoglobin is usually in the 7's).  Patient was placed on nasal cannula, received IV fluids and hydromorphone injections with improvement of pain.    Overview/Hospital Course:  48 year old female with history of sickle cell presented to hospital for generalized pain.  Patient evaluated in the emergency department, CTA of chest without PE, D-dimer 21.  Hemoglobin 8 which is above baseline for patient as she usually runs and sevens.  Patient admitted for further evaluation to the hospitals medicine  service.  CTA of abdomen and pelvis pending.  Ultrasound of bilateral lower extremities pending.  Patient is maintained on Dilaudid 2 mg IV push q.2 hours for pain.    Interval History:     Review of Systems   Constitutional:  Positive for activity change, appetite change and fatigue. Negative for chills and fever.   Respiratory:  Positive for chest tightness and shortness of breath.    Gastrointestinal:  Negative for abdominal distention, abdominal pain, diarrhea and vomiting.   Musculoskeletal:  Positive for arthralgias, back pain and myalgias.     Objective:     Vital Signs (Most Recent):  Temp: 98.1 °F (36.7 °C) (10/12/24 0715)  Pulse: 87 (10/12/24 0715)  Resp: 16 (10/12/24 1026)  BP: 137/69 (10/12/24 0715)  SpO2: 100 % (10/12/24 0715) Vital Signs (24h Range):  Temp:  [97.9 °F (36.6 °C)-99.2 °F (37.3 °C)] 98.1 °F (36.7 °C)  Pulse:  [] 87  Resp:  [13-19] 16  SpO2:  [95 %-100 %] 100 %  BP: (123-142)/(66-84) 137/69     Weight: 81.6 kg (179 lb 12.8 oz)  Body mass index is 32.89 kg/m².    Intake/Output Summary (Last 24 hours) at 10/12/2024 1046  Last data filed at 10/11/2024 2157  Gross per 24 hour   Intake 1000 ml   Output --   Net 1000 ml         Physical Exam  Constitutional:       Appearance: Normal appearance.   HENT:      Head: Normocephalic.      Nose: Nose normal.      Mouth/Throat:      Mouth: Mucous membranes are moist.      Pharynx: Oropharynx is clear.   Eyes:      Conjunctiva/sclera: Conjunctivae normal.      Pupils: Pupils are equal, round, and reactive to light.   Cardiovascular:      Rate and Rhythm: Normal rate and regular rhythm.      Pulses: Normal pulses.      Heart sounds: Normal heart sounds.   Pulmonary:      Effort: Pulmonary effort is normal.      Breath sounds: Normal breath sounds.   Abdominal:      General: Bowel sounds are normal. There is no distension.      Palpations: Abdomen is soft.      Tenderness: There is no abdominal tenderness. There is no guarding.   Musculoskeletal:          General: Normal range of motion.   Skin:     General: Skin is warm.      Capillary Refill: Capillary refill takes less than 2 seconds.   Neurological:      General: No focal deficit present.      Mental Status: She is alert and oriented to person, place, and time.   Psychiatric:         Mood and Affect: Mood normal.             Significant Labs: All pertinent labs within the past 24 hours have been reviewed.  Recent Lab Results  (Last 5 results in the past 24 hours)        10/12/24  0834   10/12/24  0756   10/11/24  2324   10/11/24  2143   10/11/24  1951        Anion Gap 4               Appearance, UA       Clear         Bacteria, UA       Rare         Baso # 0.04               Basophil % 0.4               Bilirubin (UA)       Negative         BUN 6               Calcium 8.6               Chloride 105               CO2 30               Color, UA       Yellow         Creatinine 0.5               D-Dimer         21.07  Comment: The quantitative D-dimer assay should be used as an aid in   the diagnosis of deep vein thrombosis and pulmonary embolism  in patients with the appropriate presentation and clinical  history. The upper limit of the reference interval and the clinical   cut off   point are identical. Causes of a positive (>0.50 mg/L FEU) D-Dimer   test  include, but are not limited to: DVT, PE, DIC, thrombolytic   therapy, anticoagulant therapy, recent surgery, trauma, or   pregnancy, disseminated malignancy, aortic aneurysm, cirrhosis,  and severe infection. False negative results may occur in   patients with distal DVT.  DDimer critical result(s) repeated. Called and verbal readback   obtained from ASHLEY KHOURY Rn by AS2 10/11/2024 20:32         Differential Method Automated               eGFR >60.0               Eos # 0.6               Eos % 5.3               Glucose 124               Glucose, UA       Negative         Gran # (ANC) 5.2               Gran % 49.8               Hematocrit 23.2                 23.2               Hemoglobin 7.7                7.6               Hyaline Casts, UA       2         Immature Grans (Abs) 0.06  Comment: Mild elevation in immature granulocytes is non specific and   can be seen in a variety of conditions including stress response,   acute inflammation, trauma and pregnancy. Correlation with other   laboratory and clinical findings is essential.                 Immature Granulocytes 0.6               INR         1.1  Comment: Coumadin Therapy:  2.0 - 3.0 for INR for all indicators except mechanical heart valves  and antiphospholipid syndromes which should use 2.5 - 3.5.         Ketones, UA       Negative         Lactic Acid Level 0.7  Comment: Falsely low lactic acid results can be found in samples   containing >=13.0 mg/dL total bilirubin and/or >=3.5 mg/dL   direct bilirubin.                 Lactate Dehydrogenase 522  Comment: Results are increased in hemolyzed samples.               Leukocyte Esterase, UA       Negative         Lymph # 3.4               Lymph % 32.6               Magnesium  1.6               MCH 31.7                31.1               MCHC 33.2                32.8               MCV 96                95               Microscopic Comment       SEE COMMENT  Comment: Other formed elements not mentioned in the report are not   present in the microscopic examination.            Mono # 1.2               Mono % 11.3               MPV 9.9                10.0               NITRITE UA       Negative         nRBC 2               Blood, UA       1+         pH, UA       7.0         Phosphorus Level 4.0               Platelet Count 304                306               POCT Glucose   109             Potassium 3.7               Protein, UA       1+  Comment: Recommend a 24 hour urine protein or a urine   protein/creatinine ratio if globulin induced proteinuria is  clinically suspected.           PT         12.0       RBC 2.43                2.44               RBC, UA       1          RDW 20.6                20.3               Sodium 139               Spec Grav UA       1.010         Specimen UA       Urine, Clean Catch         Squam Epithel, UA       0         Troponin I High Sensitivity     9.7  Comment: Troponin results differ between methods. Do not use   results between Troponin methods interchangeably.    Alkaline Phospatase levels above 400 U/L may   cause false positive results.    Access hsTnI should not be used for patients taking   Asfotase aury (Strensiq).             Unclassified Crystals       2         UROBILINOGEN UA       2.0-3.0         WBC, UA       1         WBC 10.47                10.40                                      Significant Imaging: I have reviewed all pertinent imaging results/findings within the past 24 hours.    Assessment/Plan:      * Sickle cell crisis  Patient presented with generalized pain for 2 weeks which was unresponsive to her routine pain regimen.  Pain has resolved with IV Dilaudid    Resume home dose MS contin and oxycodone  Dilaudid IV 2mg q2h PRN for breakthrough pain  Continue supplemental oxygen - wean as tolerated  Continue IV hydration  Continue hydroxyurea, glutamine w/ meals  Continue gabapentin, folic acid          Migraine    Continue verapamil  Continue amitriptyline    Left upper quadrant abdominal pain  Patient has vague LUQ abdominal pain for over 1 year.  CTA chest revealed an infarcted spleen.  Dimer = 21    Check lactic acid  Consider CTA abdomen if pain is persistent to evaluate for mesenteric ischemia / pelvic thrombosis       History of pulmonary embolus (PE)  CTA chest is negative.  Patient is not currently on anticoagulation.  D dimer is 21 - suspect chronically elevated secondary to sickle cell.  BLE doppler us negative   Monitor respiratory status         VTE Risk Mitigation (From admission, onward)           Ordered     enoxaparin injection 40 mg  Daily         10/12/24 0341     IP VTE HIGH RISK PATIENT  Once          10/12/24 0341     Place sequential compression device  Until discontinued         10/12/24 0341                    Discharge Planning   CORRINE:      Code Status: Full Code   Is the patient medically ready for discharge?:     Reason for patient still in hospital (select all that apply): Treatment and Imaging                     VALERIE Gomez  Department of Hospital Medicine   [facility]

## 2024-10-12 NOTE — ASSESSMENT & PLAN NOTE
CTA chest is negative.  Patient is not currently on anticoagulation.  D dimer is 21 - suspect chronically elevated secondary to sickle cell.    Monitor respiratory status

## 2024-10-12 NOTE — ASSESSMENT & PLAN NOTE
Patient has vague LUQ abdominal pain for over 1 year.  CTA chest revealed an infarcted spleen.  Dimer = 21    Check lactic acid  Consider CTA abdomen if pain is persistent to evaluate for mesenteric ischemia / pelvic thrombosis

## 2024-10-12 NOTE — H&P
[facility] - Emergency Dept  Hospital Medicine  History & Physical    Patient Name: Sunita Mendez  MRN: [id number]  Patient Class: OP- Observation  Admission Date: 10/11/2024  Attending Physician: Travis Reynolds MD   Primary Care Provider: Sebastián Chambers MD         Patient information was obtained from patient and ER records.     Subjective:     Principal Problem:Sickle cell crisis    Chief Complaint:   Chief Complaint   Patient presents with    Sickle Cell Pain Crisis     Pt states she is having pain d/t a sickle cell crisis for 2 weeks        HPI: 48 year old female with PMH of sickle cell anemia, bilateral PE in 2010 status post 3 months of Lovenox presents to ED due to generalized pain which has been persistent for 2 weeks.  Patient has been taking her home dose of MS Contin and oxycodone without significant improvement.  Also reports intermittent chest pain 2 days ago which has resolved but had persistent shortness of breath and dry cough.   Denies lower extremity edema, fevers, chills, dysuria.  Reports some nausea but no vomiting.  Patient also reports having a headache consistent with her migraines which is usually exacerbated by sickle cell pain crisis.  Patient also reports having vague abdominal pain for over 1 year.  Describes pain is different from her usual sickle cell pain, dull in nature, is generalized and shifting - currently in the LUQ.  Patient was supposed to have a CT abdomen completed by her primary care doctor however her insurance rejected the test.  Labs revealed a D-dimer of 21.07. CTA chest was negative for PE or consolidation, revealed auto infarcted spleen.  Hemoglobin is 8.1 (patient reports hemoglobin is usually in the 7's).  Patient was placed on nasal cannula, received IV fluids and hydromorphone injections with improvement of pain.    Past Medical History:   Diagnosis Date    Anticoagulant long-term use        History reviewed. No pertinent surgical  history.    Review of patient's allergies indicates:  No Known Allergies    No current facility-administered medications on file prior to encounter.     Current Outpatient Medications on File Prior to Encounter   Medication Sig    ALPRAZolam (XANAX) 1 MG tablet Take 1 mg by mouth nightly as needed for Anxiety.    amitriptyline (ELAVIL) 50 MG tablet Take 1 tablet by mouth every evening.    folic acid (FOLVITE) 1 MG tablet Take 1 tablet (1 mg total) by mouth once daily.    gabapentin (NEURONTIN) 100 MG capsule Take 100 mg by mouth 3 (three) times daily.    glutamine, sickle cell, (ENDARI) 5 gram PwPk Take 15 g by mouth 3 (three) times daily with meals.    hydroxyurea (HYDREA) 500 mg Cap Take 1,000 mg by mouth once daily.    morphine (MS CONTIN) 30 MG 12 hr tablet Take 30 mg by mouth 2 (two) times daily as needed for Pain.    multivitamin (ONE DAILY MULTIVITAMIN) per tablet Take 1 tablet by mouth once daily.    oxyCODONE (ROXICODONE) 15 MG Tab Take 15 mg by mouth every 4 (four) hours as needed for Pain.    OZEMPIC 0.25 mg or 0.5 mg (2 mg/3 mL) pen injector Inject 0.25-0.5 mg into the skin every 7 days.    polyethylene glycol (GLYCOLAX) 17 gram PwPk Take 17 g by mouth once daily. Hold for diarrhea    topiramate (TOPAMAX) 100 MG tablet Take 1 tablet (100 mg total) by mouth 2 (two) times daily. (Patient not taking: Reported on 3/24/2024)    verapamiL (CALAN) 80 MG tablet Take 1 tablet by mouth 3 (three) times daily.    zolpidem (AMBIEN) 10 mg Tab Take 10 mg by mouth nightly as needed.     Family History    None       Tobacco Use    Smoking status: Never    Smokeless tobacco: Never   Substance and Sexual Activity    Alcohol use: Not Currently    Drug use: Not Currently    Sexual activity: Not Currently     Review of Systems   Constitutional:  Negative for chills and fever.        Reports generalized pain in extremities, abdomen and chest    HENT:  Negative for sore throat.    Eyes:  Negative for visual disturbance.    Respiratory:  Positive for shortness of breath. Negative for cough and wheezing.    Cardiovascular:  Positive for chest pain. Negative for palpitations and leg swelling.   Gastrointestinal:  Positive for abdominal pain and nausea. Negative for vomiting.   Genitourinary:  Negative for dysuria.   Musculoskeletal:  Positive for arthralgias.   Neurological:  Negative for dizziness and numbness.   Psychiatric/Behavioral:  Negative for behavioral problems and confusion.      Objective:     Vital Signs (Most Recent):  Temp: 99.2 °F (37.3 °C) (10/11/24 1759)  Pulse: 86 (10/12/24 0130)  Resp: 18 (10/12/24 0152)  BP: 131/66 (10/12/24 0130)  SpO2: 100 % (10/12/24 0130) Vital Signs (24h Range):  Temp:  [99.2 °F (37.3 °C)] 99.2 °F (37.3 °C)  Pulse:  [] 86  Resp:  [13-18] 18  SpO2:  [95 %-100 %] 100 %  BP: (123-142)/(66-84) 131/66     Weight: 81.6 kg (180 lb)  Body mass index is 32.92 kg/m².     Physical Exam  Constitutional:       General: She is not in acute distress.     Appearance: Normal appearance. She is obese. She is not toxic-appearing or diaphoretic.   HENT:      Head: Normocephalic and atraumatic.      Mouth/Throat:      Mouth: Mucous membranes are moist.   Eyes:      General: No scleral icterus.     Extraocular Movements: Extraocular movements intact.      Pupils: Pupils are equal, round, and reactive to light.   Cardiovascular:      Rate and Rhythm: Normal rate.      Heart sounds: No murmur heard.  Pulmonary:      Effort: No respiratory distress.      Breath sounds: No wheezing or rales.   Abdominal:      Palpations: Abdomen is soft.      Tenderness: There is abdominal tenderness.      Comments: Mild LUQ pain to palpation    Musculoskeletal:      Right lower leg: No edema.      Left lower leg: No edema.   Skin:     General: Skin is warm and dry.   Neurological:      General: No focal deficit present.      Mental Status: She is alert and oriented to person, place, and time.   Psychiatric:         Mood and  Affect: Mood normal.         Behavior: Behavior normal.              CRANIAL NERVES     CN III, IV, VI   Pupils are equal, round, and reactive to light.       Significant Labs: All pertinent labs within the past 24 hours have been reviewed.  CBC:   Recent Labs   Lab 10/11/24  1830   WBC 11.60   HGB 8.1*   HCT 24.8*        CMP:   Recent Labs   Lab 10/11/24  1830      K 3.6      CO2 30*   GLU 89   BUN 7   CREATININE 0.6   CALCIUM 9.2   PROT 8.4   ALBUMIN 4.4   BILITOT 2.4*   ALKPHOS 81   AST 25   ALT 9*   ANIONGAP 6*       Significant Imaging: I have reviewed all pertinent imaging results/findings within the past 24 hours.  Assessment/Plan:     * Sickle cell crisis  Patient presented with generalized pain for 2 weeks which was unresponsive to her routine pain regimen.  Pain has resolved with IV Dilaudid    Resume home dose MS contin and oxycodone  Dilaudid IV 1mg PRN for breakthrough pain  Continue supplemental oxygen - wean as tolerated  Continue IV hydration  Continue hydroxyurea, glutamine w/ meals  Continue gabapentin, folic acid          Migraine    Continue verapamil  Continue amitriptyline    Left upper quadrant abdominal pain  Patient has vague LUQ abdominal pain for over 1 year.  CTA chest revealed an infarcted spleen.  Dimer = 21    Check lactic acid  Consider CTA abdomen if pain is persistent to evaluate for mesenteric ischemia / pelvic thrombosis       History of pulmonary embolus (PE)  CTA chest is negative.  Patient is not currently on anticoagulation.  D dimer is 21 - suspect chronically elevated secondary to sickle cell.    Monitor respiratory status         VTE Risk Mitigation (From admission, onward)           Ordered     enoxaparin injection 40 mg  Daily         10/12/24 0341     IP VTE HIGH RISK PATIENT  Once         10/12/24 0341     Place sequential compression device  Until discontinued         10/12/24 0341                       On 10/12/2024, patient should be placed in  hospital observation services under my care.             Travis Reynolds MD  Department of Hospital Medicine  [facility] - Emergency Dept

## 2024-10-12 NOTE — SUBJECTIVE & OBJECTIVE
Interval History:     Review of Systems   Constitutional:  Positive for activity change, appetite change and fatigue. Negative for chills and fever.   Respiratory:  Positive for chest tightness and shortness of breath.    Gastrointestinal:  Negative for abdominal distention, abdominal pain, diarrhea and vomiting.   Musculoskeletal:  Positive for arthralgias, back pain and myalgias.     Objective:     Vital Signs (Most Recent):  Temp: 98.1 °F (36.7 °C) (10/12/24 0715)  Pulse: 87 (10/12/24 0715)  Resp: 16 (10/12/24 1026)  BP: 137/69 (10/12/24 0715)  SpO2: 100 % (10/12/24 0715) Vital Signs (24h Range):  Temp:  [97.9 °F (36.6 °C)-99.2 °F (37.3 °C)] 98.1 °F (36.7 °C)  Pulse:  [] 87  Resp:  [13-19] 16  SpO2:  [95 %-100 %] 100 %  BP: (123-142)/(66-84) 137/69     Weight: 81.6 kg (179 lb 12.8 oz)  Body mass index is 32.89 kg/m².    Intake/Output Summary (Last 24 hours) at 10/12/2024 1046  Last data filed at 10/11/2024 2157  Gross per 24 hour   Intake 1000 ml   Output --   Net 1000 ml         Physical Exam  Constitutional:       Appearance: Normal appearance.   HENT:      Head: Normocephalic.      Nose: Nose normal.      Mouth/Throat:      Mouth: Mucous membranes are moist.      Pharynx: Oropharynx is clear.   Eyes:      Conjunctiva/sclera: Conjunctivae normal.      Pupils: Pupils are equal, round, and reactive to light.   Cardiovascular:      Rate and Rhythm: Normal rate and regular rhythm.      Pulses: Normal pulses.      Heart sounds: Normal heart sounds.   Pulmonary:      Effort: Pulmonary effort is normal.      Breath sounds: Normal breath sounds.   Abdominal:      General: Bowel sounds are normal. There is no distension.      Palpations: Abdomen is soft.      Tenderness: There is no abdominal tenderness. There is no guarding.   Musculoskeletal:         General: Normal range of motion.   Skin:     General: Skin is warm.      Capillary Refill: Capillary refill takes less than 2 seconds.   Neurological:      General:  No focal deficit present.      Mental Status: She is alert and oriented to person, place, and time.   Psychiatric:         Mood and Affect: Mood normal.             Significant Labs: All pertinent labs within the past 24 hours have been reviewed.  Recent Lab Results  (Last 5 results in the past 24 hours)        10/12/24  0834   10/12/24  0756   10/11/24  2324   10/11/24  2143   10/11/24  1951        Anion Gap 4               Appearance, UA       Clear         Bacteria, UA       Rare         Baso # 0.04               Basophil % 0.4               Bilirubin (UA)       Negative         BUN 6               Calcium 8.6               Chloride 105               CO2 30               Color, UA       Yellow         Creatinine 0.5               D-Dimer         21.07  Comment: The quantitative D-dimer assay should be used as an aid in   the diagnosis of deep vein thrombosis and pulmonary embolism  in patients with the appropriate presentation and clinical  history. The upper limit of the reference interval and the clinical   cut off   point are identical. Causes of a positive (>0.50 mg/L FEU) D-Dimer   test  include, but are not limited to: DVT, PE, DIC, thrombolytic   therapy, anticoagulant therapy, recent surgery, trauma, or   pregnancy, disseminated malignancy, aortic aneurysm, cirrhosis,  and severe infection. False negative results may occur in   patients with distal DVT.  DDimer critical result(s) repeated. Called and verbal readback   obtained from ASHLEY KHOURY Rn by AS2 10/11/2024 20:32         Differential Method Automated               eGFR >60.0               Eos # 0.6               Eos % 5.3               Glucose 124               Glucose, UA       Negative         Gran # (ANC) 5.2               Gran % 49.8               Hematocrit 23.2                23.2               Hemoglobin 7.7                7.6               Hyaline Casts, UA       2         Immature Grans (Abs) 0.06  Comment: Mild elevation in immature  granulocytes is non specific and   can be seen in a variety of conditions including stress response,   acute inflammation, trauma and pregnancy. Correlation with other   laboratory and clinical findings is essential.                 Immature Granulocytes 0.6               INR         1.1  Comment: Coumadin Therapy:  2.0 - 3.0 for INR for all indicators except mechanical heart valves  and antiphospholipid syndromes which should use 2.5 - 3.5.         Ketones, UA       Negative         Lactic Acid Level 0.7  Comment: Falsely low lactic acid results can be found in samples   containing >=13.0 mg/dL total bilirubin and/or >=3.5 mg/dL   direct bilirubin.                 Lactate Dehydrogenase 522  Comment: Results are increased in hemolyzed samples.               Leukocyte Esterase, UA       Negative         Lymph # 3.4               Lymph % 32.6               Magnesium  1.6               MCH 31.7                31.1               MCHC 33.2                32.8               MCV 96                95               Microscopic Comment       SEE COMMENT  Comment: Other formed elements not mentioned in the report are not   present in the microscopic examination.            Mono # 1.2               Mono % 11.3               MPV 9.9                10.0               NITRITE UA       Negative         nRBC 2               Blood, UA       1+         pH, UA       7.0         Phosphorus Level 4.0               Platelet Count 304                306               POCT Glucose   109             Potassium 3.7               Protein, UA       1+  Comment: Recommend a 24 hour urine protein or a urine   protein/creatinine ratio if globulin induced proteinuria is  clinically suspected.           PT         12.0       RBC 2.43                2.44               RBC, UA       1         RDW 20.6                20.3               Sodium 139               Spec Grav UA       1.010         Specimen UA       Urine, Clean Catch         Squam Epithel,  UA       0         Troponin I High Sensitivity     9.7  Comment: Troponin results differ between methods. Do not use   results between Troponin methods interchangeably.    Alkaline Phospatase levels above 400 U/L may   cause false positive results.    Access hsTnI should not be used for patients taking   Asfotase aury (Strensiq).             Unclassified Crystals       2         UROBILINOGEN UA       2.0-3.0         WBC, UA       1         WBC 10.47                10.40                                      Significant Imaging: I have reviewed all pertinent imaging results/findings within the past 24 hours.

## 2024-10-12 NOTE — PLAN OF CARE
Problem: Adult Inpatient Plan of Care  Goal: Plan of Care Review  Outcome: Progressing  Goal: Patient-Specific Goal (Individualized)  Outcome: Progressing  Goal: Absence of Hospital-Acquired Illness or Injury  Outcome: Progressing  Goal: Optimal Comfort and Wellbeing  Outcome: Progressing  Goal: Readiness for Transition of Care  Outcome: Progressing     Problem: Sickle Cell Disease  Goal: Optimal Cerebral Tissue Perfusion  Outcome: Progressing  Goal: Optimal Coping with Sickle Cell Disease  Outcome: Progressing  Goal: Effective Tissue Perfusion  Outcome: Progressing  Goal: Absence of Infection Signs and Symptoms  Outcome: Progressing  Goal: Optimal Pain Control and Function  Outcome: Progressing  Goal: Optimal Oxygenation  Outcome: Progressing

## 2024-10-12 NOTE — SUBJECTIVE & OBJECTIVE
Past Medical History:   Diagnosis Date    Anticoagulant long-term use        History reviewed. No pertinent surgical history.    Review of patient's allergies indicates:  No Known Allergies    No current facility-administered medications on file prior to encounter.     Current Outpatient Medications on File Prior to Encounter   Medication Sig    ALPRAZolam (XANAX) 1 MG tablet Take 1 mg by mouth nightly as needed for Anxiety.    amitriptyline (ELAVIL) 50 MG tablet Take 1 tablet by mouth every evening.    folic acid (FOLVITE) 1 MG tablet Take 1 tablet (1 mg total) by mouth once daily.    gabapentin (NEURONTIN) 100 MG capsule Take 100 mg by mouth 3 (three) times daily.    glutamine, sickle cell, (ENDARI) 5 gram PwPk Take 15 g by mouth 3 (three) times daily with meals.    hydroxyurea (HYDREA) 500 mg Cap Take 1,000 mg by mouth once daily.    morphine (MS CONTIN) 30 MG 12 hr tablet Take 30 mg by mouth 2 (two) times daily as needed for Pain.    multivitamin (ONE DAILY MULTIVITAMIN) per tablet Take 1 tablet by mouth once daily.    oxyCODONE (ROXICODONE) 15 MG Tab Take 15 mg by mouth every 4 (four) hours as needed for Pain.    OZEMPIC 0.25 mg or 0.5 mg (2 mg/3 mL) pen injector Inject 0.25-0.5 mg into the skin every 7 days.    polyethylene glycol (GLYCOLAX) 17 gram PwPk Take 17 g by mouth once daily. Hold for diarrhea    topiramate (TOPAMAX) 100 MG tablet Take 1 tablet (100 mg total) by mouth 2 (two) times daily. (Patient not taking: Reported on 3/24/2024)    verapamiL (CALAN) 80 MG tablet Take 1 tablet by mouth 3 (three) times daily.    zolpidem (AMBIEN) 10 mg Tab Take 10 mg by mouth nightly as needed.     Family History    None       Tobacco Use    Smoking status: Never    Smokeless tobacco: Never   Substance and Sexual Activity    Alcohol use: Not Currently    Drug use: Not Currently    Sexual activity: Not Currently     Review of Systems   Constitutional:  Negative for chills and fever.        Reports generalized pain in  extremities, abdomen and chest    HENT:  Negative for sore throat.    Eyes:  Negative for visual disturbance.   Respiratory:  Positive for shortness of breath. Negative for cough and wheezing.    Cardiovascular:  Positive for chest pain. Negative for palpitations and leg swelling.   Gastrointestinal:  Positive for abdominal pain and nausea. Negative for vomiting.   Genitourinary:  Negative for dysuria.   Musculoskeletal:  Positive for arthralgias.   Neurological:  Negative for dizziness and numbness.   Psychiatric/Behavioral:  Negative for behavioral problems and confusion.      Objective:     Vital Signs (Most Recent):  Temp: 99.2 °F (37.3 °C) (10/11/24 1759)  Pulse: 86 (10/12/24 0130)  Resp: 18 (10/12/24 0152)  BP: 131/66 (10/12/24 0130)  SpO2: 100 % (10/12/24 0130) Vital Signs (24h Range):  Temp:  [99.2 °F (37.3 °C)] 99.2 °F (37.3 °C)  Pulse:  [] 86  Resp:  [13-18] 18  SpO2:  [95 %-100 %] 100 %  BP: (123-142)/(66-84) 131/66     Weight: 81.6 kg (180 lb)  Body mass index is 32.92 kg/m².     Physical Exam  Constitutional:       General: She is not in acute distress.     Appearance: Normal appearance. She is obese. She is not toxic-appearing or diaphoretic.   HENT:      Head: Normocephalic and atraumatic.      Mouth/Throat:      Mouth: Mucous membranes are moist.   Eyes:      General: No scleral icterus.     Extraocular Movements: Extraocular movements intact.      Pupils: Pupils are equal, round, and reactive to light.   Cardiovascular:      Rate and Rhythm: Normal rate.      Heart sounds: No murmur heard.  Pulmonary:      Effort: No respiratory distress.      Breath sounds: No wheezing or rales.   Abdominal:      Palpations: Abdomen is soft.      Tenderness: There is abdominal tenderness.      Comments: Mild LUQ pain to palpation    Musculoskeletal:      Right lower leg: No edema.      Left lower leg: No edema.   Skin:     General: Skin is warm and dry.   Neurological:      General: No focal deficit present.       Mental Status: She is alert and oriented to person, place, and time.   Psychiatric:         Mood and Affect: Mood normal.         Behavior: Behavior normal.              CRANIAL NERVES     CN III, IV, VI   Pupils are equal, round, and reactive to light.       Significant Labs: All pertinent labs within the past 24 hours have been reviewed.  CBC:   Recent Labs   Lab 10/11/24  1830   WBC 11.60   HGB 8.1*   HCT 24.8*        CMP:   Recent Labs   Lab 10/11/24  1830      K 3.6      CO2 30*   GLU 89   BUN 7   CREATININE 0.6   CALCIUM 9.2   PROT 8.4   ALBUMIN 4.4   BILITOT 2.4*   ALKPHOS 81   AST 25   ALT 9*   ANIONGAP 6*       Significant Imaging: I have reviewed all pertinent imaging results/findings within the past 24 hours.

## 2024-10-13 LAB
ANION GAP SERPL CALC-SCNC: 3 MMOL/L (ref 8–16)
BASOPHILS # BLD AUTO: 0.04 K/UL (ref 0–0.2)
BASOPHILS NFR BLD: 0.5 % (ref 0–1.9)
BUN SERPL-MCNC: 11 MG/DL (ref 6–20)
CALCIUM SERPL-MCNC: 8.4 MG/DL (ref 8.7–10.5)
CHLORIDE SERPL-SCNC: 106 MMOL/L (ref 95–110)
CO2 SERPL-SCNC: 32 MMOL/L (ref 23–29)
CREAT SERPL-MCNC: 0.6 MG/DL (ref 0.5–1.4)
DIFFERENTIAL METHOD BLD: ABNORMAL
EOSINOPHIL # BLD AUTO: 0.8 K/UL (ref 0–0.5)
EOSINOPHIL NFR BLD: 9.9 % (ref 0–8)
ERYTHROCYTE [DISTWIDTH] IN BLOOD BY AUTOMATED COUNT: 20.9 % (ref 11.5–14.5)
EST. GFR  (NO RACE VARIABLE): >60 ML/MIN/1.73 M^2
GLUCOSE SERPL-MCNC: 113 MG/DL (ref 70–110)
HCT VFR BLD AUTO: 21.3 % (ref 37–48.5)
HGB BLD-MCNC: 7 G/DL (ref 12–16)
IMM GRANULOCYTES # BLD AUTO: 0.03 K/UL (ref 0–0.04)
IMM GRANULOCYTES NFR BLD AUTO: 0.4 % (ref 0–0.5)
LYMPHOCYTES # BLD AUTO: 3.6 K/UL (ref 1–4.8)
LYMPHOCYTES NFR BLD: 47.7 % (ref 18–48)
MAGNESIUM SERPL-MCNC: 1.9 MG/DL (ref 1.6–2.6)
MCH RBC QN AUTO: 32.1 PG (ref 27–31)
MCHC RBC AUTO-ENTMCNC: 32.9 G/DL (ref 32–36)
MCV RBC AUTO: 98 FL (ref 82–98)
MONOCYTES # BLD AUTO: 0.9 K/UL (ref 0.3–1)
MONOCYTES NFR BLD: 11.5 % (ref 4–15)
NEUTROPHILS # BLD AUTO: 2.3 K/UL (ref 1.8–7.7)
NEUTROPHILS NFR BLD: 30 % (ref 38–73)
NRBC BLD-RTO: 2 /100 WBC
PHOSPHATE SERPL-MCNC: 3.9 MG/DL (ref 2.7–4.5)
PLATELET # BLD AUTO: 283 K/UL (ref 150–450)
PMV BLD AUTO: 10.3 FL (ref 9.2–12.9)
POTASSIUM SERPL-SCNC: 4.4 MMOL/L (ref 3.5–5.1)
RBC # BLD AUTO: 2.18 M/UL (ref 4–5.4)
SODIUM SERPL-SCNC: 141 MMOL/L (ref 136–145)
WBC # BLD AUTO: 7.55 K/UL (ref 3.9–12.7)

## 2024-10-13 PROCEDURE — 94761 N-INVAS EAR/PLS OXIMETRY MLT: CPT

## 2024-10-13 PROCEDURE — 99900031 HC PATIENT EDUCATION (STAT)

## 2024-10-13 PROCEDURE — 84100 ASSAY OF PHOSPHORUS: CPT | Performed by: STUDENT IN AN ORGANIZED HEALTH CARE EDUCATION/TRAINING PROGRAM

## 2024-10-13 PROCEDURE — 25000003 PHARM REV CODE 250: Performed by: INTERNAL MEDICINE

## 2024-10-13 PROCEDURE — 63600175 PHARM REV CODE 636 W HCPCS: Performed by: STUDENT IN AN ORGANIZED HEALTH CARE EDUCATION/TRAINING PROGRAM

## 2024-10-13 PROCEDURE — 99223 1ST HOSP IP/OBS HIGH 75: CPT | Mod: ,,, | Performed by: STUDENT IN AN ORGANIZED HEALTH CARE EDUCATION/TRAINING PROGRAM

## 2024-10-13 PROCEDURE — 80048 BASIC METABOLIC PNL TOTAL CA: CPT | Performed by: STUDENT IN AN ORGANIZED HEALTH CARE EDUCATION/TRAINING PROGRAM

## 2024-10-13 PROCEDURE — 25000003 PHARM REV CODE 250: Performed by: STUDENT IN AN ORGANIZED HEALTH CARE EDUCATION/TRAINING PROGRAM

## 2024-10-13 PROCEDURE — 63600175 PHARM REV CODE 636 W HCPCS: Performed by: NURSE PRACTITIONER

## 2024-10-13 PROCEDURE — 36415 COLL VENOUS BLD VENIPUNCTURE: CPT | Performed by: STUDENT IN AN ORGANIZED HEALTH CARE EDUCATION/TRAINING PROGRAM

## 2024-10-13 PROCEDURE — 63600175 PHARM REV CODE 636 W HCPCS: Performed by: INTERNAL MEDICINE

## 2024-10-13 PROCEDURE — 25000003 PHARM REV CODE 250: Performed by: NURSE PRACTITIONER

## 2024-10-13 PROCEDURE — 25500020 PHARM REV CODE 255: Performed by: INTERNAL MEDICINE

## 2024-10-13 PROCEDURE — 96372 THER/PROPH/DIAG INJ SC/IM: CPT | Performed by: STUDENT IN AN ORGANIZED HEALTH CARE EDUCATION/TRAINING PROGRAM

## 2024-10-13 PROCEDURE — 21400001 HC TELEMETRY ROOM

## 2024-10-13 PROCEDURE — 85025 COMPLETE CBC W/AUTO DIFF WBC: CPT | Performed by: STUDENT IN AN ORGANIZED HEALTH CARE EDUCATION/TRAINING PROGRAM

## 2024-10-13 PROCEDURE — 27000221 HC OXYGEN, UP TO 24 HOURS

## 2024-10-13 PROCEDURE — 83735 ASSAY OF MAGNESIUM: CPT | Performed by: STUDENT IN AN ORGANIZED HEALTH CARE EDUCATION/TRAINING PROGRAM

## 2024-10-13 RX ORDER — MAGNESIUM SULFATE 1 G/100ML
1 INJECTION INTRAVENOUS ONCE
Status: COMPLETED | OUTPATIENT
Start: 2024-10-13 | End: 2024-10-13

## 2024-10-13 RX ORDER — SUMATRIPTAN 6 MG/.5ML
6 INJECTION, SOLUTION SUBCUTANEOUS ONCE
Status: COMPLETED | OUTPATIENT
Start: 2024-10-13 | End: 2024-10-13

## 2024-10-13 RX ORDER — IODIXANOL 320 MG/ML
100 INJECTION, SOLUTION INTRAVASCULAR
Status: COMPLETED | OUTPATIENT
Start: 2024-10-13 | End: 2024-10-13

## 2024-10-13 RX ORDER — DIAZEPAM 10 MG/2ML
2.5 INJECTION INTRAMUSCULAR ONCE
Status: COMPLETED | OUTPATIENT
Start: 2024-10-13 | End: 2024-10-13

## 2024-10-13 RX ADMIN — THERA TABS 1 TABLET: TAB at 08:10

## 2024-10-13 RX ADMIN — GABAPENTIN 100 MG: 100 CAPSULE ORAL at 08:10

## 2024-10-13 RX ADMIN — HYDROMORPHONE HYDROCHLORIDE 2 MG: 1 INJECTION, SOLUTION INTRAMUSCULAR; INTRAVENOUS; SUBCUTANEOUS at 01:10

## 2024-10-13 RX ADMIN — IBUPROFEN 400 MG: 200 TABLET, FILM COATED ORAL at 08:10

## 2024-10-13 RX ADMIN — FOLIC ACID 1 MG: 1 TABLET ORAL at 08:10

## 2024-10-13 RX ADMIN — SENNOSIDES AND DOCUSATE SODIUM 1 TABLET: 8.6; 5 TABLET ORAL at 08:10

## 2024-10-13 RX ADMIN — IODIXANOL 100 ML: 320 INJECTION, SOLUTION INTRAVASCULAR at 08:10

## 2024-10-13 RX ADMIN — VERAPAMIL HYDROCHLORIDE 80 MG: 80 TABLET ORAL at 08:10

## 2024-10-13 RX ADMIN — HYDROMORPHONE HYDROCHLORIDE 2 MG: 1 INJECTION, SOLUTION INTRAMUSCULAR; INTRAVENOUS; SUBCUTANEOUS at 08:10

## 2024-10-13 RX ADMIN — GABAPENTIN 100 MG: 100 CAPSULE ORAL at 03:10

## 2024-10-13 RX ADMIN — ALPRAZOLAM 1 MG: 0.5 TABLET ORAL at 08:10

## 2024-10-13 RX ADMIN — AMITRIPTYLINE HYDROCHLORIDE 50 MG: 25 TABLET, FILM COATED ORAL at 08:10

## 2024-10-13 RX ADMIN — ENOXAPARIN SODIUM 40 MG: 40 INJECTION SUBCUTANEOUS at 04:10

## 2024-10-13 RX ADMIN — HYDROMORPHONE HYDROCHLORIDE 2 MG: 1 INJECTION, SOLUTION INTRAMUSCULAR; INTRAVENOUS; SUBCUTANEOUS at 06:10

## 2024-10-13 RX ADMIN — SODIUM CHLORIDE 1000 ML: 0.45 INJECTION, SOLUTION INTRAVENOUS at 10:10

## 2024-10-13 RX ADMIN — SUMATRIPTAN 6 MG: 6 INJECTION, SOLUTION SUBCUTANEOUS at 10:10

## 2024-10-13 RX ADMIN — ALPRAZOLAM 1 MG: 0.5 TABLET ORAL at 10:10

## 2024-10-13 RX ADMIN — VERAPAMIL HYDROCHLORIDE 80 MG: 80 TABLET ORAL at 03:10

## 2024-10-13 RX ADMIN — MAGNESIUM SULFATE HEPTAHYDRATE 1 G: 1 INJECTION, SOLUTION INTRAVENOUS at 10:10

## 2024-10-13 RX ADMIN — HYDROXYUREA 1000 MG: 500 CAPSULE ORAL at 08:10

## 2024-10-13 RX ADMIN — PROMETHAZINE HYDROCHLORIDE 12.5 MG: 25 INJECTION INTRAMUSCULAR; INTRAVENOUS at 09:10

## 2024-10-13 RX ADMIN — DIAZEPAM 2.5 MG: 5 INJECTION, SOLUTION INTRAMUSCULAR; INTRAVENOUS at 09:10

## 2024-10-13 NOTE — CONSULTS
[facility]  General Surgery  Consult Note    Inpatient consult to General Surgery  Consult performed by: Remy Francisco MD  Consult ordered by: Renetta Costa FNP        Subjective:     Chief Complaint/Reason for Admission:  Sickle crisis    History of Present Illness:  This is a 48-year-old female with sickle cell disease.  She presented with abdominal pain.  CT imaging was concerning for splenic infarction which is likely chronic.  I was consulted.    No current facility-administered medications on file prior to encounter.     Current Outpatient Medications on File Prior to Encounter   Medication Sig    amitriptyline (ELAVIL) 50 MG tablet Take 1 tablet by mouth every evening.    folic acid (FOLVITE) 1 MG tablet Take 1 tablet (1 mg total) by mouth once daily.    gabapentin (NEURONTIN) 100 MG capsule Take 100 mg by mouth 3 (three) times daily.    hydroxyurea (HYDREA) 500 mg Cap Take 1,000 mg by mouth once daily.    morphine (MS CONTIN) 30 MG 12 hr tablet Take 30 mg by mouth every 8 (eight) hours as needed.    multivitamin (ONE DAILY MULTIVITAMIN) per tablet Take 1 tablet by mouth once daily.    oxyCODONE (ROXICODONE) 15 MG Tab Take 15 mg by mouth every 4 (four) hours as needed for Pain.    OZEMPIC 1 mg/dose (4 mg/3 mL) Inject 1 mg into the skin every 7 days.    verapamiL (CALAN) 80 MG tablet Take 1 tablet by mouth 3 (three) times daily.    ALPRAZolam (XANAX) 1 MG tablet Take 1 mg by mouth nightly as needed for Anxiety.    glutamine, sickle cell, (ENDARI) 5 gram PwPk Take 15 g by mouth 3 (three) times daily with meals.    polyethylene glycol (GLYCOLAX) 17 gram PwPk Take 17 g by mouth once daily. Hold for diarrhea    zolpidem (AMBIEN) 10 mg Tab Take 10 mg by mouth nightly as needed.       Review of patient's allergies indicates:  No Known Allergies    Past Medical History:   Diagnosis Date    Anticoagulant long-term use      History reviewed. No pertinent surgical history.  Family History    None        Tobacco Use    Smoking status: Never    Smokeless tobacco: Never   Substance and Sexual Activity    Alcohol use: Not Currently    Drug use: Not Currently    Sexual activity: Not Currently     Review of Systems   Constitutional:  Negative for fever.   HENT: Negative.     Eyes: Negative.    Respiratory: Negative.     Cardiovascular: Negative.    Gastrointestinal:  Positive for abdominal pain.   Endocrine: Negative.    Genitourinary: Negative.    Musculoskeletal: Negative.    Skin: Negative.    Allergic/Immunologic: Negative.    Neurological: Negative.    Hematological: Negative.    Psychiatric/Behavioral: Negative.       Objective:     Vital Signs (Most Recent):  Temp: 97.9 °F (36.6 °C) (10/13/24 1500)  Pulse: 83 (10/13/24 1500)  Resp: 16 (10/13/24 1500)  BP: (!) 108/55 (10/13/24 1500)  SpO2: 95 % (10/13/24 1500) Vital Signs (24h Range):  Temp:  [97.5 °F (36.4 °C)-98.5 °F (36.9 °C)] 97.9 °F (36.6 °C)  Pulse:  [66-83] 83  Resp:  [16-18] 16  SpO2:  [92 %-100 %] 95 %  BP: (104-122)/(55-66) 108/55     Weight: 81.6 kg (179 lb 12.8 oz)  Body mass index is 32.89 kg/m².      Intake/Output Summary (Last 24 hours) at 10/13/2024 1632  Last data filed at 10/13/2024 1616  Gross per 24 hour   Intake 720 ml   Output 750 ml   Net -30 ml       Physical Exam  Constitutional:       General: She is not in acute distress.     Appearance: Normal appearance. She is not ill-appearing, toxic-appearing or diaphoretic.   HENT:      Head: Normocephalic.      Nose: Nose normal.   Eyes:      Conjunctiva/sclera: Conjunctivae normal.   Cardiovascular:      Rate and Rhythm: Normal rate and regular rhythm.   Pulmonary:      Effort: Pulmonary effort is normal.   Abdominal:      Palpations: Abdomen is soft.   Musculoskeletal:         General: Normal range of motion.      Cervical back: Normal range of motion.   Skin:     General: Skin is warm.   Neurological:      General: No focal deficit present.      Mental Status: She is alert.   Psychiatric:          Mood and Affect: Mood normal.         Significant Labs:  CBC:   Recent Labs   Lab 10/13/24  0517   WBC 7.55   RBC 2.18*   HGB 7.0*   HCT 21.3*      MCV 98   MCH 32.1*   MCHC 32.9     CMP:   Recent Labs   Lab 10/11/24  1830 10/12/24  0834 10/13/24  0517   GLU 89   < > 113*   CALCIUM 9.2   < > 8.4*   ALBUMIN 4.4  --   --    PROT 8.4  --   --       < > 141   K 3.6   < > 4.4   CO2 30*   < > 32*      < > 106   BUN 7   < > 11   CREATININE 0.6   < > 0.6   ALKPHOS 81  --   --    ALT 9*  --   --    AST 25  --   --    BILITOT 2.4*  --   --     < > = values in this interval not displayed.     Coagulation:   Recent Labs   Lab 10/11/24  1951   INR 1.1     Lactic Acid:   Recent Labs   Lab 10/12/24  0834   LACTATE 0.7       Significant Diagnostics:  CT scan was reviewed.  Gallstones without secondary signs of cholecystitis.  A very small spleen.    Assessment/Plan:     Active Diagnoses:    Diagnosis Date Noted POA    PRINCIPAL PROBLEM:  Sickle cell crisis [D57.00] 08/18/2020 Yes    Left upper quadrant abdominal pain [R10.12] 10/12/2024 Yes    Migraine [G43.909] 10/12/2024 Yes    History of pulmonary embolus (PE) [Z86.711] 03/11/2017 Yes      Problems Resolved During this Admission:    Diagnosis Date Noted Date Resolved POA    Status migrainous [G43.909] 04/01/2023 10/12/2024 Yes     48-year-old female with sickle cell disease.  Her spleen is very small.  No indications for splenectomy as it has likely nonfunctional already given the atrophic size.    Discuss rationale behind proceeding with cholecystectomy.  Patient does not seem to have symptomatic stones currently.      Recommended consider outpatient ID follow-up for splenectomy vaccines.    Thank you for your consult. I will sign off. Please contact us if you have any additional questions.    Remy Francisco MD  General Surgery  [facility]

## 2024-10-13 NOTE — ASSESSMENT & PLAN NOTE
Patient has vague LUQ abdominal pain for over 1 year.  CTA chest revealed an infarcted spleen.  Dimer = 21  Check lactic acid  Consider CTA abdomen and pelvis with calcification and atrophy of the spleen consistent with chronic infarction.  No other vascular occlusions aneurysms or dissections otherwise noted  General surgery consulted, no surgical intervention necessary at this time

## 2024-10-13 NOTE — SUBJECTIVE & OBJECTIVE
Interval History:  Patient is sitting up in bed, evaluated.  Reports pain slightly improved today.  Discussed findings from CTA of abdomen and pelvis.  Continues to receive Dilaudid 2 mg Q 2 hours p.r.n..  We will try to transition to p.o. pain medication today.    Review of Systems   Constitutional:  Positive for activity change, appetite change and fatigue. Negative for chills and fever.   Respiratory:  Positive for chest tightness and shortness of breath.    Gastrointestinal:  Negative for abdominal distention, abdominal pain, diarrhea and vomiting.   Musculoskeletal:  Positive for arthralgias, back pain and myalgias.     Objective:     Vital Signs (Most Recent):  Temp: 97.7 °F (36.5 °C) (10/13/24 1144)  Pulse: 66 (10/13/24 1144)  Resp: 17 (10/13/24 1144)  BP: (!) 106/58 (10/13/24 1144)  SpO2: 100 % (10/13/24 1144) Vital Signs (24h Range):  Temp:  [97.5 °F (36.4 °C)-98.5 °F (36.9 °C)] 97.7 °F (36.5 °C)  Pulse:  [66-81] 66  Resp:  [16-18] 17  SpO2:  [92 %-100 %] 100 %  BP: (104-136)/(57-79) 106/58     Weight: 81.6 kg (179 lb 12.8 oz)  Body mass index is 32.89 kg/m².    Intake/Output Summary (Last 24 hours) at 10/13/2024 1318  Last data filed at 10/13/2024 0915  Gross per 24 hour   Intake 420 ml   Output 750 ml   Net -330 ml         Physical Exam  Constitutional:       Appearance: Normal appearance.   HENT:      Head: Normocephalic.      Nose: Nose normal.      Mouth/Throat:      Mouth: Mucous membranes are moist.      Pharynx: Oropharynx is clear.   Eyes:      Conjunctiva/sclera: Conjunctivae normal.      Pupils: Pupils are equal, round, and reactive to light.   Cardiovascular:      Rate and Rhythm: Normal rate and regular rhythm.      Pulses: Normal pulses.      Heart sounds: Normal heart sounds.   Pulmonary:      Effort: Pulmonary effort is normal.      Breath sounds: Normal breath sounds.   Abdominal:      General: Bowel sounds are normal. There is no distension.      Palpations: Abdomen is soft.       Tenderness: There is no abdominal tenderness. There is no guarding.   Musculoskeletal:         General: Normal range of motion.   Skin:     General: Skin is warm.      Capillary Refill: Capillary refill takes less than 2 seconds.   Neurological:      General: No focal deficit present.      Mental Status: She is alert and oriented to person, place, and time.   Psychiatric:         Mood and Affect: Mood normal.             Significant Labs: All pertinent labs within the past 24 hours have been reviewed.  Recent Lab Results         10/13/24  0517        Anion Gap 3       Baso # 0.04       Basophil % 0.5       BUN 11       Calcium 8.4       Chloride 106       CO2 32       Creatinine 0.6       Differential Method Automated       eGFR >60.0       Eos # 0.8       Eos % 9.9       Glucose 113       Gran # (ANC) 2.3       Gran % 30.0       Hematocrit 21.3       Hemoglobin 7.0       Immature Grans (Abs) 0.03  Comment: Mild elevation in immature granulocytes is non specific and   can be seen in a variety of conditions including stress response,   acute inflammation, trauma and pregnancy. Correlation with other   laboratory and clinical findings is essential.         Immature Granulocytes 0.4       Lymph # 3.6       Lymph % 47.7       Magnesium  1.9       MCH 32.1       MCHC 32.9       MCV 98       Mono # 0.9       Mono % 11.5       MPV 10.3       nRBC 2       Phosphorus Level 3.9       Platelet Count 283       Potassium 4.4       RBC 2.18       RDW 20.9       Sodium 141       WBC 7.55               Significant Imaging: I have reviewed all pertinent imaging results/findings within the past 24 hours.

## 2024-10-13 NOTE — PROGRESS NOTES
[facility] Medicine  Progress Note    Patient Name: Sunita Mendez  MRN: [id number]  Patient Class: OP- Observation   Admission Date: 10/11/2024  Length of Stay: 0 days  Attending Physician: Valdemar Heller MD  Primary Care Provider: Sebastián Chambers MD        Subjective:     Principal Problem:Sickle cell crisis        HPI:  48 year old female with PMH of sickle cell anemia, bilateral PE in 2010 status post 3 months of Lovenox presents to ED due to generalized pain which has been persistent for 2 weeks.  Patient has been taking her home dose of MS Contin and oxycodone without significant improvement.  Also reports intermittent chest pain 2 days ago which has resolved but had persistent shortness of breath and dry cough.   Denies lower extremity edema, fevers, chills, dysuria.  Reports some nausea but no vomiting.  Patient also reports having a headache consistent with her migraines which is usually exacerbated by sickle cell pain crisis.  Patient also reports having vague abdominal pain for over 1 year.  Describes pain is different from her usual sickle cell pain, dull in nature, is generalized and shifting - currently in the LUQ.  Patient was supposed to have a CT abdomen completed by her primary care doctor however her insurance rejected the test.  Labs revealed a D-dimer of 21.07. CTA chest was negative for PE or consolidation, revealed auto infarcted spleen.  Hemoglobin is 8.1 (patient reports hemoglobin is usually in the 7's).  Patient was placed on nasal cannula, received IV fluids and hydromorphone injections with improvement of pain.    Overview/Hospital Course:  48 year old female with history of sickle cell presented to hospital for generalized pain.  Patient evaluated in the emergency department, CTA of chest without PE, D-dimer 21.  Hemoglobin 8 which is above baseline for patient as she usually runs and sevens.  Patient admitted for further evaluation to the [facility] medicine  service.  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  General surgery consulted, no intervention warranted.  Ultrasound of bilateral lower extremities without DVT.  Patient is maintained on Dilaudid 2 mg IV push q.2 hours for pain.    Interval History:  Patient is sitting up in bed, evaluated.  Reports pain slightly improved today.  Discussed findings from CTA of abdomen and pelvis.  Continues to receive Dilaudid 2 mg Q 2 hours p.r.n..  We will try to transition to p.o. pain medication today.    Review of Systems   Constitutional:  Positive for activity change, appetite change and fatigue. Negative for chills and fever.   Respiratory:  Positive for chest tightness and shortness of breath.    Gastrointestinal:  Negative for abdominal distention, abdominal pain, diarrhea and vomiting.   Musculoskeletal:  Positive for arthralgias, back pain and myalgias.     Objective:     Vital Signs (Most Recent):  Temp: 97.7 °F (36.5 °C) (10/13/24 1144)  Pulse: 66 (10/13/24 1144)  Resp: 17 (10/13/24 1144)  BP: (!) 106/58 (10/13/24 1144)  SpO2: 100 % (10/13/24 1144) Vital Signs (24h Range):  Temp:  [97.5 °F (36.4 °C)-98.5 °F (36.9 °C)] 97.7 °F (36.5 °C)  Pulse:  [66-81] 66  Resp:  [16-18] 17  SpO2:  [92 %-100 %] 100 %  BP: (104-136)/(57-79) 106/58     Weight: 81.6 kg (179 lb 12.8 oz)  Body mass index is 32.89 kg/m².    Intake/Output Summary (Last 24 hours) at 10/13/2024 1318  Last data filed at 10/13/2024 0915  Gross per 24 hour   Intake 420 ml   Output 750 ml   Net -330 ml         Physical Exam  Constitutional:       Appearance: Normal appearance.   HENT:      Head: Normocephalic.      Nose: Nose normal.      Mouth/Throat:      Mouth: Mucous membranes are moist.      Pharynx: Oropharynx is clear.   Eyes:      Conjunctiva/sclera: Conjunctivae normal.      Pupils: Pupils are equal, round, and reactive to light.   Cardiovascular:      Rate and Rhythm: Normal rate and regular rhythm.      Pulses: Normal pulses.      Heart  sounds: Normal heart sounds.   Pulmonary:      Effort: Pulmonary effort is normal.      Breath sounds: Normal breath sounds.   Abdominal:      General: Bowel sounds are normal. There is no distension.      Palpations: Abdomen is soft.      Tenderness: There is no abdominal tenderness. There is no guarding.   Musculoskeletal:         General: Normal range of motion.   Skin:     General: Skin is warm.      Capillary Refill: Capillary refill takes less than 2 seconds.   Neurological:      General: No focal deficit present.      Mental Status: She is alert and oriented to person, place, and time.   Psychiatric:         Mood and Affect: Mood normal.             Significant Labs: All pertinent labs within the past 24 hours have been reviewed.  Recent Lab Results         10/13/24  0517        Anion Gap 3       Baso # 0.04       Basophil % 0.5       BUN 11       Calcium 8.4       Chloride 106       CO2 32       Creatinine 0.6       Differential Method Automated       eGFR >60.0       Eos # 0.8       Eos % 9.9       Glucose 113       Gran # (ANC) 2.3       Gran % 30.0       Hematocrit 21.3       Hemoglobin 7.0       Immature Grans (Abs) 0.03  Comment: Mild elevation in immature granulocytes is non specific and   can be seen in a variety of conditions including stress response,   acute inflammation, trauma and pregnancy. Correlation with other   laboratory and clinical findings is essential.         Immature Granulocytes 0.4       Lymph # 3.6       Lymph % 47.7       Magnesium  1.9       MCH 32.1       MCHC 32.9       MCV 98       Mono # 0.9       Mono % 11.5       MPV 10.3       nRBC 2       Phosphorus Level 3.9       Platelet Count 283       Potassium 4.4       RBC 2.18       RDW 20.9       Sodium 141       WBC 7.55               Significant Imaging: I have reviewed all pertinent imaging results/findings within the past 24 hours.    Assessment/Plan:      * Sickle cell crisis  Patient presented with generalized pain for 2  weeks which was unresponsive to her routine pain regimen.  Pain has resolved with IV Dilaudid    Resume home dose MS contin and oxycodone  Dilaudid IV 2mg q2h PRN for breakthrough pain  Continue supplemental oxygen - wean as tolerated  Continue IV hydration  Continue hydroxyurea, glutamine w/ meals  Continue gabapentin, folic acid          Migraine    Continue verapamil  Continue amitriptyline    Left upper quadrant abdominal pain  Patient has vague LUQ abdominal pain for over 1 year.  CTA chest revealed an infarcted spleen.  Dimer = 21  Check lactic acid  Consider CTA abdomen and pelvis with calcification and atrophy of the spleen consistent with chronic infarction.  No other vascular occlusions aneurysms or dissections otherwise noted  General surgery consulted, no surgical intervention necessary at this time      History of pulmonary embolus (PE)  CTA chest is negative.  Patient is not currently on anticoagulation.  D dimer is 21 - suspect chronically elevated secondary to sickle cell.  BLE doppler us negative   Monitor respiratory status         VTE Risk Mitigation (From admission, onward)           Ordered     enoxaparin injection 40 mg  Daily         10/12/24 0341     IP VTE HIGH RISK PATIENT  Once         10/12/24 0341     Place sequential compression device  Until discontinued         10/12/24 0341                    Discharge Planning   CORRINE: 10/15/2024     Code Status: Full Code   Is the patient medically ready for discharge?:     Reason for patient still in hospital (select all that apply): Patient trending condition and Treatment  Discharge Plan A: Home                  VALERIE Gomez  Department of Hospital Medicine   [facility]

## 2024-10-13 NOTE — PLAN OF CARE
10/13/24 0810   Patient Assessment/Suction   Level of Consciousness (AVPU) alert   Respiratory Effort Normal;Unlabored   Expansion/Accessory Muscles/Retractions no use of accessory muscles;no retractions   All Lung Fields Breath Sounds clear   PRE-TX-O2   Device (Oxygen Therapy) room air  (nasal cannula on stand by)   SpO2 (!) 94 %   Pulse Oximetry Type Intermittent   $ Pulse Oximetry - Multiple Charge Pulse Oximetry - Multiple   Pulse 74   Resp 18   Education   $ Education 15 min;Oxygen

## 2024-10-14 LAB
ANION GAP SERPL CALC-SCNC: 4 MMOL/L (ref 8–16)
BASOPHILS # BLD AUTO: 0.03 K/UL (ref 0–0.2)
BASOPHILS NFR BLD: 0.3 % (ref 0–1.9)
BUN SERPL-MCNC: 8 MG/DL (ref 6–20)
CALCIUM SERPL-MCNC: 8.4 MG/DL (ref 8.7–10.5)
CHLORIDE SERPL-SCNC: 103 MMOL/L (ref 95–110)
CO2 SERPL-SCNC: 31 MMOL/L (ref 23–29)
CREAT SERPL-MCNC: 0.6 MG/DL (ref 0.5–1.4)
DIFFERENTIAL METHOD BLD: ABNORMAL
EOSINOPHIL # BLD AUTO: 0.9 K/UL (ref 0–0.5)
EOSINOPHIL NFR BLD: 8.4 % (ref 0–8)
ERYTHROCYTE [DISTWIDTH] IN BLOOD BY AUTOMATED COUNT: 20.9 % (ref 11.5–14.5)
EST. GFR  (NO RACE VARIABLE): >60 ML/MIN/1.73 M^2
GLUCOSE SERPL-MCNC: 127 MG/DL (ref 70–110)
HCT VFR BLD AUTO: 21.9 % (ref 37–48.5)
HGB BLD-MCNC: 7.1 G/DL (ref 12–16)
IMM GRANULOCYTES # BLD AUTO: 0.04 K/UL (ref 0–0.04)
IMM GRANULOCYTES NFR BLD AUTO: 0.4 % (ref 0–0.5)
LYMPHOCYTES # BLD AUTO: 4.6 K/UL (ref 1–4.8)
LYMPHOCYTES NFR BLD: 45.5 % (ref 18–48)
MAGNESIUM SERPL-MCNC: 2.1 MG/DL (ref 1.6–2.6)
MCH RBC QN AUTO: 31.4 PG (ref 27–31)
MCHC RBC AUTO-ENTMCNC: 32.4 G/DL (ref 32–36)
MCV RBC AUTO: 97 FL (ref 82–98)
MONOCYTES # BLD AUTO: 1.1 K/UL (ref 0.3–1)
MONOCYTES NFR BLD: 10.8 % (ref 4–15)
NEUTROPHILS # BLD AUTO: 3.5 K/UL (ref 1.8–7.7)
NEUTROPHILS NFR BLD: 34.6 % (ref 38–73)
NRBC BLD-RTO: 2 /100 WBC
OHS QRS DURATION: 70 MS
OHS QTC CALCULATION: 357 MS
PHOSPHATE SERPL-MCNC: 3.7 MG/DL (ref 2.7–4.5)
PLATELET # BLD AUTO: 296 K/UL (ref 150–450)
PMV BLD AUTO: 10.4 FL (ref 9.2–12.9)
POTASSIUM SERPL-SCNC: 4.4 MMOL/L (ref 3.5–5.1)
RBC # BLD AUTO: 2.26 M/UL (ref 4–5.4)
SODIUM SERPL-SCNC: 138 MMOL/L (ref 136–145)
WBC # BLD AUTO: 10.08 K/UL (ref 3.9–12.7)

## 2024-10-14 PROCEDURE — 25000003 PHARM REV CODE 250: Performed by: STUDENT IN AN ORGANIZED HEALTH CARE EDUCATION/TRAINING PROGRAM

## 2024-10-14 PROCEDURE — 63600175 PHARM REV CODE 636 W HCPCS: Performed by: STUDENT IN AN ORGANIZED HEALTH CARE EDUCATION/TRAINING PROGRAM

## 2024-10-14 PROCEDURE — 94761 N-INVAS EAR/PLS OXIMETRY MLT: CPT

## 2024-10-14 PROCEDURE — 63600175 PHARM REV CODE 636 W HCPCS: Performed by: INTERNAL MEDICINE

## 2024-10-14 PROCEDURE — 83735 ASSAY OF MAGNESIUM: CPT | Performed by: STUDENT IN AN ORGANIZED HEALTH CARE EDUCATION/TRAINING PROGRAM

## 2024-10-14 PROCEDURE — 63600175 PHARM REV CODE 636 W HCPCS: Performed by: NURSE PRACTITIONER

## 2024-10-14 PROCEDURE — 12000002 HC ACUTE/MED SURGE SEMI-PRIVATE ROOM

## 2024-10-14 PROCEDURE — 94760 N-INVAS EAR/PLS OXIMETRY 1: CPT

## 2024-10-14 PROCEDURE — 80048 BASIC METABOLIC PNL TOTAL CA: CPT | Performed by: STUDENT IN AN ORGANIZED HEALTH CARE EDUCATION/TRAINING PROGRAM

## 2024-10-14 PROCEDURE — 63600175 PHARM REV CODE 636 W HCPCS: Performed by: HOSPITALIST

## 2024-10-14 PROCEDURE — 84100 ASSAY OF PHOSPHORUS: CPT | Performed by: STUDENT IN AN ORGANIZED HEALTH CARE EDUCATION/TRAINING PROGRAM

## 2024-10-14 PROCEDURE — 25000003 PHARM REV CODE 250: Performed by: INTERNAL MEDICINE

## 2024-10-14 PROCEDURE — 27000221 HC OXYGEN, UP TO 24 HOURS

## 2024-10-14 PROCEDURE — 99900031 HC PATIENT EDUCATION (STAT)

## 2024-10-14 PROCEDURE — 36415 COLL VENOUS BLD VENIPUNCTURE: CPT | Performed by: STUDENT IN AN ORGANIZED HEALTH CARE EDUCATION/TRAINING PROGRAM

## 2024-10-14 PROCEDURE — 85025 COMPLETE CBC W/AUTO DIFF WBC: CPT | Performed by: STUDENT IN AN ORGANIZED HEALTH CARE EDUCATION/TRAINING PROGRAM

## 2024-10-14 RX ORDER — HYDROMORPHONE HYDROCHLORIDE 1 MG/ML
1 INJECTION, SOLUTION INTRAMUSCULAR; INTRAVENOUS; SUBCUTANEOUS ONCE
Status: COMPLETED | OUTPATIENT
Start: 2024-10-14 | End: 2024-10-14

## 2024-10-14 RX ORDER — METOCLOPRAMIDE HYDROCHLORIDE 5 MG/ML
10 INJECTION INTRAMUSCULAR; INTRAVENOUS ONCE
Status: COMPLETED | OUTPATIENT
Start: 2024-10-14 | End: 2024-10-14

## 2024-10-14 RX ORDER — KETOROLAC TROMETHAMINE 30 MG/ML
15 INJECTION, SOLUTION INTRAMUSCULAR; INTRAVENOUS ONCE
Status: COMPLETED | OUTPATIENT
Start: 2024-10-14 | End: 2024-10-14

## 2024-10-14 RX ORDER — DIPHENHYDRAMINE HYDROCHLORIDE 50 MG/ML
50 INJECTION INTRAMUSCULAR; INTRAVENOUS ONCE
Status: COMPLETED | OUTPATIENT
Start: 2024-10-14 | End: 2024-10-14

## 2024-10-14 RX ORDER — BUTALBITAL, ACETAMINOPHEN AND CAFFEINE 50; 325; 40 MG/1; MG/1; MG/1
1 TABLET ORAL EVERY 4 HOURS PRN
Status: DISCONTINUED | OUTPATIENT
Start: 2024-10-14 | End: 2024-10-15

## 2024-10-14 RX ORDER — HYDROMORPHONE HYDROCHLORIDE 1 MG/ML
2 INJECTION, SOLUTION INTRAMUSCULAR; INTRAVENOUS; SUBCUTANEOUS EVERY 6 HOURS PRN
Status: DISCONTINUED | OUTPATIENT
Start: 2024-10-14 | End: 2024-10-14

## 2024-10-14 RX ORDER — HYDROMORPHONE HYDROCHLORIDE 1 MG/ML
1 INJECTION, SOLUTION INTRAMUSCULAR; INTRAVENOUS; SUBCUTANEOUS EVERY 6 HOURS PRN
Status: DISCONTINUED | OUTPATIENT
Start: 2024-10-14 | End: 2024-10-14

## 2024-10-14 RX ORDER — ALPRAZOLAM 0.5 MG/1
0.5 TABLET ORAL 2 TIMES DAILY PRN
Status: DISCONTINUED | OUTPATIENT
Start: 2024-10-14 | End: 2024-10-20 | Stop reason: HOSPADM

## 2024-10-14 RX ORDER — HYDROMORPHONE HYDROCHLORIDE 1 MG/ML
2 INJECTION, SOLUTION INTRAMUSCULAR; INTRAVENOUS; SUBCUTANEOUS EVERY 4 HOURS PRN
Status: DISCONTINUED | OUTPATIENT
Start: 2024-10-14 | End: 2024-10-15

## 2024-10-14 RX ADMIN — HYDROMORPHONE HYDROCHLORIDE 1 MG: 1 INJECTION, SOLUTION INTRAMUSCULAR; INTRAVENOUS; SUBCUTANEOUS at 07:10

## 2024-10-14 RX ADMIN — FOLIC ACID 1 MG: 1 TABLET ORAL at 10:10

## 2024-10-14 RX ADMIN — METOCLOPRAMIDE 10 MG: 5 INJECTION, SOLUTION INTRAMUSCULAR; INTRAVENOUS at 10:10

## 2024-10-14 RX ADMIN — ENOXAPARIN SODIUM 40 MG: 40 INJECTION SUBCUTANEOUS at 05:10

## 2024-10-14 RX ADMIN — SODIUM CHLORIDE: 9 INJECTION, SOLUTION INTRAVENOUS at 12:10

## 2024-10-14 RX ADMIN — HYDROMORPHONE HYDROCHLORIDE 2 MG: 1 INJECTION, SOLUTION INTRAMUSCULAR; INTRAVENOUS; SUBCUTANEOUS at 04:10

## 2024-10-14 RX ADMIN — THERA TABS 1 TABLET: TAB at 10:10

## 2024-10-14 RX ADMIN — GABAPENTIN 100 MG: 100 CAPSULE ORAL at 10:10

## 2024-10-14 RX ADMIN — HYDROMORPHONE HYDROCHLORIDE 1 MG: 1 INJECTION, SOLUTION INTRAMUSCULAR; INTRAVENOUS; SUBCUTANEOUS at 08:10

## 2024-10-14 RX ADMIN — DIPHENHYDRAMINE HYDROCHLORIDE 50 MG: 50 INJECTION INTRAMUSCULAR; INTRAVENOUS at 10:10

## 2024-10-14 RX ADMIN — SENNOSIDES AND DOCUSATE SODIUM 1 TABLET: 8.6; 5 TABLET ORAL at 10:10

## 2024-10-14 RX ADMIN — ZOLPIDEM TARTRATE 10 MG: 5 TABLET, COATED ORAL at 10:10

## 2024-10-14 RX ADMIN — ALPRAZOLAM 0.5 MG: 0.5 TABLET ORAL at 10:10

## 2024-10-14 RX ADMIN — HYDROXYUREA 1000 MG: 500 CAPSULE ORAL at 10:10

## 2024-10-14 RX ADMIN — AMITRIPTYLINE HYDROCHLORIDE 50 MG: 25 TABLET, FILM COATED ORAL at 10:10

## 2024-10-14 RX ADMIN — KETOROLAC TROMETHAMINE 15 MG: 30 INJECTION, SOLUTION INTRAMUSCULAR; INTRAVENOUS at 10:10

## 2024-10-14 RX ADMIN — VERAPAMIL HYDROCHLORIDE 80 MG: 80 TABLET ORAL at 10:10

## 2024-10-14 RX ADMIN — HYDROMORPHONE HYDROCHLORIDE 2 MG: 1 INJECTION, SOLUTION INTRAMUSCULAR; INTRAVENOUS; SUBCUTANEOUS at 11:10

## 2024-10-14 RX ADMIN — HYDROMORPHONE HYDROCHLORIDE 2 MG: 1 INJECTION, SOLUTION INTRAMUSCULAR; INTRAVENOUS; SUBCUTANEOUS at 10:10

## 2024-10-14 NOTE — ASSESSMENT & PLAN NOTE
We will try IV Toradol, Reglan and Benadryl for abortive therapy  Continue verapamil  Continue amitriptyline

## 2024-10-14 NOTE — NURSING TRANSFER
Nursing Transfer Note      10/14/2024   6:02 PM    Nurse giving handoff:Shey  Nurse receiving handoff:Venecia    Reason patient is being transferred: Inpatient    Transfer To: [id number]    Transfer via wheelchair    Transfer with cardiac monitoring    Transported by staff x 1     Transfer Vital Signs:  Blood Pressure:132/60  Heart Rate:86  O2:96 on RA  Temperature:99.3 oral  Respirations:17    Telemetry: Rate SR  Order for Tele Monitor? Yes    Additional Lines: n/a    Medicines sent: n/a    Any special needs or follow-up needed: n/a    Patient belongings transferred with patient: Yes    Chart send with patient: Yes    Notified: son    Patient reassessed at: 10/14/24 @ 1803    Upon arrival to floor: cardiac monitor applied, patient oriented to room, call bell in reach, and bed in lowest position

## 2024-10-14 NOTE — ASSESSMENT & PLAN NOTE
Controlled at present  Patient has vague LUQ abdominal pain for over 1 year.  CTA chest revealed an infarcted spleen.  Dimer = 21  Check lactic acid  Consider CTA abdomen and pelvis with calcification and atrophy of the spleen consistent with chronic infarction.  No other vascular occlusions aneurysms or dissections otherwise noted  General surgery consulted, no surgical intervention necessary at this time  Recommend follow up with Infectious Disease and or PCP for vaccinations given atrophic spleen

## 2024-10-14 NOTE — PLAN OF CARE
10/13/24 2036   Patient Assessment/Suction   Level of Consciousness (AVPU) alert   Respiratory Effort Unlabored   Expansion/Accessory Muscles/Retractions no use of accessory muscles   All Lung Fields Breath Sounds clear   PRE-TX-O2   Device (Oxygen Therapy) nasal cannula   $ Is the patient on Low Flow Oxygen? Yes   Flow (L/min) (Oxygen Therapy) 2   SpO2 98 %   Pulse Oximetry Type Intermittent   $ Pulse Oximetry - Multiple Charge Pulse Oximetry - Multiple   Pulse 87   Resp 18   Education   $ Education Oxygen;15 min

## 2024-10-14 NOTE — PLAN OF CARE
Problem: Adult Inpatient Plan of Care  Goal: Plan of Care Review  Outcome: Progressing  Goal: Patient-Specific Goal (Individualized)  Outcome: Progressing  Goal: Absence of Hospital-Acquired Illness or Injury  Outcome: Progressing  Goal: Optimal Comfort and Wellbeing  Outcome: Progressing  Goal: Readiness for Transition of Care  Outcome: Progressing     Problem: Pneumonia  Goal: Fluid Balance  Outcome: Progressing  Goal: Resolution of Infection Signs and Symptoms  Outcome: Progressing  Goal: Effective Oxygenation and Ventilation  Outcome: Progressing     Problem: Sickle Cell Disease  Goal: Optimal Cerebral Tissue Perfusion  Outcome: Progressing  Goal: Optimal Coping with Sickle Cell Disease  Outcome: Progressing  Goal: Effective Tissue Perfusion  Outcome: Progressing  Goal: Absence of Infection Signs and Symptoms  Outcome: Progressing  Goal: Optimal Pain Control and Function  Outcome: Progressing  Goal: Optimal Oxygenation  Outcome: Progressing

## 2024-10-14 NOTE — SUBJECTIVE & OBJECTIVE
Interval History:  Patient evaluated at bedside.  She reports migraine headache.  Patient has a history of migraines and she reports this feels like 1 of her migraine headaches she tends to get.  Also reports nausea    Review of Systems   Constitutional:  Positive for activity change, appetite change and fatigue. Negative for chills and fever.   Respiratory:  Negative for chest tightness and shortness of breath.    Gastrointestinal:  Positive for nausea. Negative for abdominal distention, abdominal pain, diarrhea and vomiting.   Musculoskeletal:  Negative for arthralgias, back pain and myalgias.   Neurological:  Positive for headaches.     Objective:     Vital Signs (Most Recent):  Temp: 98.3 °F (36.8 °C) (10/14/24 0710)  Pulse: 87 (10/14/24 0900)  Resp: 17 (10/14/24 0900)  BP: (!) 111/59 (10/14/24 0710)  SpO2: (!) 93 % (10/14/24 0900) Vital Signs (24h Range):  Temp:  [97.7 °F (36.5 °C)-98.3 °F (36.8 °C)] 98.3 °F (36.8 °C)  Pulse:  [66-92] 87  Resp:  [16-18] 17  SpO2:  [91 %-100 %] 93 %  BP: (103-136)/(51-73) 111/59     Weight: 81.6 kg (179 lb 12.8 oz)  Body mass index is 32.89 kg/m².    Intake/Output Summary (Last 24 hours) at 10/14/2024 0946  Last data filed at 10/14/2024 0909  Gross per 24 hour   Intake 820 ml   Output 600 ml   Net 220 ml         Physical Exam  Constitutional:       Appearance: Normal appearance.   HENT:      Head: Normocephalic.      Nose: Nose normal.      Mouth/Throat:      Mouth: Mucous membranes are moist.      Pharynx: Oropharynx is clear.   Eyes:      Conjunctiva/sclera: Conjunctivae normal.      Pupils: Pupils are equal, round, and reactive to light.   Cardiovascular:      Rate and Rhythm: Normal rate and regular rhythm.      Pulses: Normal pulses.      Heart sounds: Normal heart sounds.   Pulmonary:      Effort: Pulmonary effort is normal.      Breath sounds: Normal breath sounds.   Abdominal:      General: Bowel sounds are normal. There is no distension.      Palpations: Abdomen is  soft.      Tenderness: There is no abdominal tenderness. There is no guarding.   Musculoskeletal:         General: Normal range of motion.   Skin:     General: Skin is warm.      Capillary Refill: Capillary refill takes less than 2 seconds.   Neurological:      General: No focal deficit present.      Mental Status: She is alert and oriented to person, place, and time.   Psychiatric:         Mood and Affect: Mood normal.             Significant Labs: All pertinent labs within the past 24 hours have been reviewed.  Recent Lab Results         10/14/24  0425        Anion Gap 4       Baso # 0.03       Basophil % 0.3       BUN 8       Calcium 8.4       Chloride 103       CO2 31       Creatinine 0.6       Differential Method Automated       eGFR >60.0       Eos # 0.9       Eos % 8.4       Glucose 127       Gran # (ANC) 3.5       Gran % 34.6       Hematocrit 21.9       Hemoglobin 7.1       Immature Grans (Abs) 0.04  Comment: Mild elevation in immature granulocytes is non specific and   can be seen in a variety of conditions including stress response,   acute inflammation, trauma and pregnancy. Correlation with other   laboratory and clinical findings is essential.         Immature Granulocytes 0.4       Lymph # 4.6       Lymph % 45.5       Magnesium  2.1       MCH 31.4       MCHC 32.4       MCV 97       Mono # 1.1       Mono % 10.8       MPV 10.4       nRBC 2       Phosphorus Level 3.7       Platelet Count 296       Potassium 4.4       RBC 2.26       RDW 20.9       Sodium 138       WBC 10.08               Significant Imaging: I have reviewed all pertinent imaging results/findings within the past 24 hours.

## 2024-10-14 NOTE — CARE UPDATE
10/14/24 0900   Patient Assessment/Suction   Level of Consciousness (AVPU) alert   Respiratory Effort Normal;Unlabored   Expansion/Accessory Muscles/Retractions no use of accessory muscles   All Lung Fields Breath Sounds clear   Rhythm/Pattern, Respiratory unlabored;pattern regular;depth regular;no shortness of breath reported   PRE-TX-O2   Device (Oxygen Therapy) nasal cannula   $ Is the patient on Low Flow Oxygen? Yes   Flow (L/min) (Oxygen Therapy) 2   SpO2 (!) 93 %   Pulse Oximetry Type Intermittent   $ Pulse Oximetry - Multiple Charge Pulse Oximetry - Multiple   Pulse 87   Resp 17   Education   $ Education Oxygen;15 min

## 2024-10-14 NOTE — NURSING
Patient complained of migraine, NP notified and ordered diphenhydramine, ketorolac and metoclopramide.  Patient continued to complain of pain 10/10 and requested Dilaudid 2mg IV Q 2 hours.  This nurse reassessed patient and patient is lying in bed with eyes closed, respirations even and unlabored, snoring.  Bed alarm on, call light within easy reach.

## 2024-10-14 NOTE — NURSING
Patient resting quietly in bed with eyes closed snoring, respirations even and unlabored.  Patient refused verapamil and gabapentin stating it's not needed now, patient educated on medications.  Patient requested Dilaudid prn but very unhappy that MD decreased dose to 1mg Q 6 hours, patient stated she needs the dose to be 2mg Q 2 hours.  MD notified that patient would like to speak with him.    1740 Report called to transfer patient to [id number]

## 2024-10-15 PROBLEM — D73.5 SPLENIC INFARCT: Status: ACTIVE | Noted: 2024-10-12

## 2024-10-15 LAB
ABO + RH BLD: ABNORMAL
BLD GP AB SCN CELLS X3 SERPL QL: ABNORMAL
BLD PROD TYP BPU: NORMAL
BLOOD GROUP ANTIBODIES SERPL: NORMAL
BLOOD UNIT EXPIRATION DATE: NORMAL
BLOOD UNIT TYPE CODE: 9500
BLOOD UNIT TYPE: NORMAL
CODING SYSTEM: NORMAL
CROSSMATCH INTERPRETATION: NORMAL
DISPENSE STATUS: NORMAL
ERYTHROCYTE [DISTWIDTH] IN BLOOD BY AUTOMATED COUNT: 21.4 % (ref 11.5–14.5)
HCT VFR BLD AUTO: 19.7 % (ref 37–48.5)
HGB BLD-MCNC: 6.5 G/DL (ref 12–16)
MCH RBC QN AUTO: 31 PG (ref 27–31)
MCHC RBC AUTO-ENTMCNC: 33 G/DL (ref 32–36)
MCV RBC AUTO: 94 FL (ref 82–98)
NUM UNITS TRANS PACKED RBC: NORMAL
PLATELET # BLD AUTO: 319 K/UL (ref 150–450)
PMV BLD AUTO: 10.5 FL (ref 9.2–12.9)
RBC # BLD AUTO: 2.1 M/UL (ref 4–5.4)
SPECIMEN OUTDATE: ABNORMAL
WBC # BLD AUTO: 12.9 K/UL (ref 3.9–12.7)

## 2024-10-15 PROCEDURE — P9016 RBC LEUKOCYTES REDUCED: HCPCS | Performed by: STUDENT IN AN ORGANIZED HEALTH CARE EDUCATION/TRAINING PROGRAM

## 2024-10-15 PROCEDURE — 25000003 PHARM REV CODE 250: Performed by: INTERNAL MEDICINE

## 2024-10-15 PROCEDURE — 25000003 PHARM REV CODE 250: Performed by: STUDENT IN AN ORGANIZED HEALTH CARE EDUCATION/TRAINING PROGRAM

## 2024-10-15 PROCEDURE — 63600175 PHARM REV CODE 636 W HCPCS: Performed by: HOSPITALIST

## 2024-10-15 PROCEDURE — 94761 N-INVAS EAR/PLS OXIMETRY MLT: CPT

## 2024-10-15 PROCEDURE — 36430 TRANSFUSION BLD/BLD COMPNT: CPT

## 2024-10-15 PROCEDURE — 85027 COMPLETE CBC AUTOMATED: CPT | Performed by: STUDENT IN AN ORGANIZED HEALTH CARE EDUCATION/TRAINING PROGRAM

## 2024-10-15 PROCEDURE — 99900035 HC TECH TIME PER 15 MIN (STAT)

## 2024-10-15 PROCEDURE — 27000221 HC OXYGEN, UP TO 24 HOURS

## 2024-10-15 PROCEDURE — 86902 BLOOD TYPE ANTIGEN DONOR EA: CPT | Performed by: STUDENT IN AN ORGANIZED HEALTH CARE EDUCATION/TRAINING PROGRAM

## 2024-10-15 PROCEDURE — 94799 UNLISTED PULMONARY SVC/PX: CPT

## 2024-10-15 PROCEDURE — 86905 BLOOD TYPING RBC ANTIGENS: CPT | Performed by: STUDENT IN AN ORGANIZED HEALTH CARE EDUCATION/TRAINING PROGRAM

## 2024-10-15 PROCEDURE — 36415 COLL VENOUS BLD VENIPUNCTURE: CPT | Performed by: STUDENT IN AN ORGANIZED HEALTH CARE EDUCATION/TRAINING PROGRAM

## 2024-10-15 PROCEDURE — 12000002 HC ACUTE/MED SURGE SEMI-PRIVATE ROOM

## 2024-10-15 PROCEDURE — 86901 BLOOD TYPING SEROLOGIC RH(D): CPT | Performed by: STUDENT IN AN ORGANIZED HEALTH CARE EDUCATION/TRAINING PROGRAM

## 2024-10-15 PROCEDURE — 86922 COMPATIBILITY TEST ANTIGLOB: CPT | Performed by: STUDENT IN AN ORGANIZED HEALTH CARE EDUCATION/TRAINING PROGRAM

## 2024-10-15 PROCEDURE — 63600175 PHARM REV CODE 636 W HCPCS: Performed by: STUDENT IN AN ORGANIZED HEALTH CARE EDUCATION/TRAINING PROGRAM

## 2024-10-15 PROCEDURE — 86900 BLOOD TYPING SEROLOGIC ABO: CPT | Performed by: STUDENT IN AN ORGANIZED HEALTH CARE EDUCATION/TRAINING PROGRAM

## 2024-10-15 PROCEDURE — 86906 BLD TYPING SEROLOGIC RH PHNT: CPT | Performed by: STUDENT IN AN ORGANIZED HEALTH CARE EDUCATION/TRAINING PROGRAM

## 2024-10-15 PROCEDURE — 86850 RBC ANTIBODY SCREEN: CPT | Performed by: STUDENT IN AN ORGANIZED HEALTH CARE EDUCATION/TRAINING PROGRAM

## 2024-10-15 PROCEDURE — 86870 RBC ANTIBODY IDENTIFICATION: CPT | Performed by: STUDENT IN AN ORGANIZED HEALTH CARE EDUCATION/TRAINING PROGRAM

## 2024-10-15 RX ORDER — MORPHINE SULFATE 15 MG/1
30 TABLET, FILM COATED, EXTENDED RELEASE ORAL EVERY 12 HOURS
Status: DISCONTINUED | OUTPATIENT
Start: 2024-10-15 | End: 2024-10-20 | Stop reason: HOSPADM

## 2024-10-15 RX ORDER — HYDROCODONE BITARTRATE AND ACETAMINOPHEN 500; 5 MG/1; MG/1
TABLET ORAL
Status: DISCONTINUED | OUTPATIENT
Start: 2024-10-15 | End: 2024-10-20 | Stop reason: HOSPADM

## 2024-10-15 RX ORDER — HYDROMORPHONE HYDROCHLORIDE 1 MG/ML
2 INJECTION, SOLUTION INTRAMUSCULAR; INTRAVENOUS; SUBCUTANEOUS
Status: DISCONTINUED | OUTPATIENT
Start: 2024-10-15 | End: 2024-10-19

## 2024-10-15 RX ADMIN — IBUPROFEN 400 MG: 200 TABLET, FILM COATED ORAL at 02:10

## 2024-10-15 RX ADMIN — HYDROMORPHONE HYDROCHLORIDE 2 MG: 1 INJECTION, SOLUTION INTRAMUSCULAR; INTRAVENOUS; SUBCUTANEOUS at 12:10

## 2024-10-15 RX ADMIN — HYDROXYUREA 1000 MG: 500 CAPSULE ORAL at 08:10

## 2024-10-15 RX ADMIN — SENNOSIDES AND DOCUSATE SODIUM 1 TABLET: 8.6; 5 TABLET ORAL at 08:10

## 2024-10-15 RX ADMIN — VERAPAMIL HYDROCHLORIDE 80 MG: 80 TABLET ORAL at 08:10

## 2024-10-15 RX ADMIN — ALPRAZOLAM 0.5 MG: 0.5 TABLET ORAL at 02:10

## 2024-10-15 RX ADMIN — FOLIC ACID 1 MG: 1 TABLET ORAL at 08:10

## 2024-10-15 RX ADMIN — ENOXAPARIN SODIUM 40 MG: 40 INJECTION SUBCUTANEOUS at 05:10

## 2024-10-15 RX ADMIN — HYDROMORPHONE HYDROCHLORIDE 2 MG: 1 INJECTION, SOLUTION INTRAMUSCULAR; INTRAVENOUS; SUBCUTANEOUS at 07:10

## 2024-10-15 RX ADMIN — AMITRIPTYLINE HYDROCHLORIDE 50 MG: 25 TABLET, FILM COATED ORAL at 08:10

## 2024-10-15 RX ADMIN — HYDROMORPHONE HYDROCHLORIDE 2 MG: 1 INJECTION, SOLUTION INTRAMUSCULAR; INTRAVENOUS; SUBCUTANEOUS at 04:10

## 2024-10-15 RX ADMIN — THERA TABS 1 TABLET: TAB at 08:10

## 2024-10-15 RX ADMIN — VERAPAMIL HYDROCHLORIDE 80 MG: 80 TABLET ORAL at 02:10

## 2024-10-15 RX ADMIN — MORPHINE SULFATE 30 MG: 15 TABLET, FILM COATED, EXTENDED RELEASE ORAL at 08:10

## 2024-10-15 NOTE — ASSESSMENT & PLAN NOTE
Still uncontrolled  Resumed home dose MS contin scheduled and oxycodone prn  Dilaudid IV 2mg q3h PRN for breakthrough pain  Continue supplemental oxygen - wean as tolerated  Encourage PO hydration  Continue hydroxyurea, gabapentin, folic acid  IS

## 2024-10-15 NOTE — ASSESSMENT & PLAN NOTE
From sickle cell  General surgery consulted, no surgical intervention necessary at this time  Recommend follow up with Infectious Disease and or PCP for vaccinations given atrophic spleen

## 2024-10-15 NOTE — CARE UPDATE
10/15/24 0753   PRE-TX-O2   Device (Oxygen Therapy) room air   SpO2 (!) 92 %   Pulse Oximetry Type Intermittent   $ Pulse Oximetry - Multiple Charge Pulse Oximetry - Multiple   Pulse 107   Resp 15   Respiratory Evaluation   $ Care Plan Tech Time 15 min

## 2024-10-15 NOTE — PLAN OF CARE
Problem: Adult Inpatient Plan of Care  Goal: Plan of Care Review  Outcome: Progressing     Problem: Pneumonia  Goal: Fluid Balance  Outcome: Progressing

## 2024-10-15 NOTE — SUBJECTIVE & OBJECTIVE
Interval History: Patient seen and examined on AM harika RAZO. Pain is uncontrolled due to migraine and leg pains. Reports fioricet does not help her.      Objective:     Vital Signs (Most Recent):  Temp: 98.3 °F (36.8 °C) (10/15/24 1544)  Pulse: 98 (10/15/24 1544)  Resp: 18 (10/15/24 1544)  BP: 110/68 (10/15/24 1544)  SpO2: 100 % (10/15/24 1544) Vital Signs (24h Range):  Temp:  [98.3 °F (36.8 °C)-99.2 °F (37.3 °C)] 98.3 °F (36.8 °C)  Pulse:  [] 98  Resp:  [15-19] 18  SpO2:  [92 %-100 %] 100 %  BP: (110-146)/(39-82) 110/68     Weight: 81.6 kg (179 lb 12.8 oz)  Body mass index is 32.89 kg/m².    Intake/Output Summary (Last 24 hours) at 10/15/2024 1551  Last data filed at 10/15/2024 1122  Gross per 24 hour   Intake 1410 ml   Output --   Net 1410 ml         Physical Exam  Vitals reviewed.   Constitutional:       General: She is not in acute distress.  HENT:      Head: Normocephalic and atraumatic.   Cardiovascular:      Rate and Rhythm: Regular rhythm. Tachycardia present.   Pulmonary:      Effort: Pulmonary effort is normal. No respiratory distress.   Neurological:      General: No focal deficit present.      Mental Status: She is alert and oriented to person, place, and time. Mental status is at baseline.   Psychiatric:         Mood and Affect: Affect normal.         Behavior: Behavior normal.         Thought Content: Thought content normal.             Significant Labs: All pertinent labs within the past 24 hours have been reviewed.    Significant Imaging: I have reviewed all pertinent imaging results/findings within the past 24 hours.

## 2024-10-15 NOTE — PROGRESS NOTES
[facility] Medicine  Progress Note    Patient Name: Sunita Mendez  MRN: [id number]  Patient Class: IP- Inpatient   Admission Date: 10/11/2024  Length of Stay: 2 days  Attending Physician: Po Goodrich MD  Primary Care Provider: Sebastián Chambers MD        Subjective:     Principal Problem:Sickle cell crisis        HPI:  48 year old female with PMH of sickle cell anemia, bilateral PE in 2010 status post 3 months of Lovenox presents to ED due to generalized pain which has been persistent for 2 weeks.  Patient has been taking her home dose of MS Contin and oxycodone without significant improvement.  Also reports intermittent chest pain 2 days ago which has resolved but had persistent shortness of breath and dry cough.   Denies lower extremity edema, fevers, chills, dysuria.  Reports some nausea but no vomiting.  Patient also reports having a headache consistent with her migraines which is usually exacerbated by sickle cell pain crisis.  Patient also reports having vague abdominal pain for over 1 year.  Describes pain is different from her usual sickle cell pain, dull in nature, is generalized and shifting - currently in the LUQ.  Patient was supposed to have a CT abdomen completed by her primary care doctor however her insurance rejected the test.  Labs revealed a D-dimer of 21.07. CTA chest was negative for PE or consolidation, revealed auto infarcted spleen.  Hemoglobin is 8.1 (patient reports hemoglobin is usually in the 7's).  Patient was placed on nasal cannula, received IV fluids and hydromorphone injections with improvement of pain.    Overview/Hospital Course:  48 year old female with history of sickle cell admitted for pain crisis and migraine. Given IV and PO narcotic pain meds as well as IVF. CTA of chest without PE, D-dimer 21.  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  General surgery consulted, no intervention warranted recommends follow up for vaccines with  Infectious Disease or PCP.  Ultrasound of bilateral lower extremities without DVT.  Hgb decreased to 6.5 so given 1u pRBC 10/15.     Interval History: Patient seen and examined on AM harika RAZO. Pain is uncontrolled due to migraine and leg pains. Reports fioricet does not help her.      Objective:     Vital Signs (Most Recent):  Temp: 98.3 °F (36.8 °C) (10/15/24 1544)  Pulse: 98 (10/15/24 1544)  Resp: 18 (10/15/24 1544)  BP: 110/68 (10/15/24 1544)  SpO2: 100 % (10/15/24 1544) Vital Signs (24h Range):  Temp:  [98.3 °F (36.8 °C)-99.2 °F (37.3 °C)] 98.3 °F (36.8 °C)  Pulse:  [] 98  Resp:  [15-19] 18  SpO2:  [92 %-100 %] 100 %  BP: (110-146)/(39-82) 110/68     Weight: 81.6 kg (179 lb 12.8 oz)  Body mass index is 32.89 kg/m².    Intake/Output Summary (Last 24 hours) at 10/15/2024 1551  Last data filed at 10/15/2024 1122  Gross per 24 hour   Intake 1410 ml   Output --   Net 1410 ml         Physical Exam  Vitals reviewed.   Constitutional:       General: She is not in acute distress.  HENT:      Head: Normocephalic and atraumatic.   Cardiovascular:      Rate and Rhythm: Regular rhythm. Tachycardia present.   Pulmonary:      Effort: Pulmonary effort is normal. No respiratory distress.   Neurological:      General: No focal deficit present.      Mental Status: She is alert and oriented to person, place, and time. Mental status is at baseline.   Psychiatric:         Mood and Affect: Affect normal.         Behavior: Behavior normal.         Thought Content: Thought content normal.             Significant Labs: All pertinent labs within the past 24 hours have been reviewed.    Significant Imaging: I have reviewed all pertinent imaging results/findings within the past 24 hours.    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  Patient is not currently on anticoagulation.  D dimer is 21 - suspect chronically elevated secondary to sickle cell.  BLE doppler us negative   Monitor respiratory status       VTE Risk Mitigation (From admission, onward)           Ordered     enoxaparin injection 40 mg  Daily         10/12/24 0341     IP VTE HIGH RISK PATIENT  Once         10/12/24 0341     Place sequential compression device  Until discontinued         10/12/24 0341                    Discharge Planning   CORRINE: 10/16/2024     Code Status: Full Code   Is the patient medically ready for discharge?:     Reason for patient still in hospital (select all that apply): Patient trending condition and Treatment  Discharge Plan A: Home                  Po Goodrich MD  Department of Hospital Medicine   [facility]

## 2024-10-16 LAB
ERYTHROCYTE [DISTWIDTH] IN BLOOD BY AUTOMATED COUNT: 20.4 % (ref 11.5–14.5)
HCT VFR BLD AUTO: 24.2 % (ref 37–48.5)
HGB BLD-MCNC: 8 G/DL (ref 12–16)
MCH RBC QN AUTO: 31.5 PG (ref 27–31)
MCHC RBC AUTO-ENTMCNC: 33.1 G/DL (ref 32–36)
MCV RBC AUTO: 95 FL (ref 82–98)
PLATELET # BLD AUTO: 323 K/UL (ref 150–450)
PMV BLD AUTO: 10.4 FL (ref 9.2–12.9)
RBC # BLD AUTO: 2.54 M/UL (ref 4–5.4)
WBC # BLD AUTO: 11.97 K/UL (ref 3.9–12.7)

## 2024-10-16 PROCEDURE — 63600175 PHARM REV CODE 636 W HCPCS: Performed by: STUDENT IN AN ORGANIZED HEALTH CARE EDUCATION/TRAINING PROGRAM

## 2024-10-16 PROCEDURE — 25000003 PHARM REV CODE 250: Performed by: STUDENT IN AN ORGANIZED HEALTH CARE EDUCATION/TRAINING PROGRAM

## 2024-10-16 PROCEDURE — 94761 N-INVAS EAR/PLS OXIMETRY MLT: CPT

## 2024-10-16 PROCEDURE — 12000002 HC ACUTE/MED SURGE SEMI-PRIVATE ROOM

## 2024-10-16 PROCEDURE — 27000221 HC OXYGEN, UP TO 24 HOURS

## 2024-10-16 PROCEDURE — 25000003 PHARM REV CODE 250: Performed by: INTERNAL MEDICINE

## 2024-10-16 PROCEDURE — 94799 UNLISTED PULMONARY SVC/PX: CPT | Mod: XB

## 2024-10-16 PROCEDURE — 85027 COMPLETE CBC AUTOMATED: CPT | Performed by: STUDENT IN AN ORGANIZED HEALTH CARE EDUCATION/TRAINING PROGRAM

## 2024-10-16 PROCEDURE — 36415 COLL VENOUS BLD VENIPUNCTURE: CPT | Performed by: STUDENT IN AN ORGANIZED HEALTH CARE EDUCATION/TRAINING PROGRAM

## 2024-10-16 RX ADMIN — SENNOSIDES AND DOCUSATE SODIUM 1 TABLET: 8.6; 5 TABLET ORAL at 09:10

## 2024-10-16 RX ADMIN — HYDROMORPHONE HYDROCHLORIDE 2 MG: 1 INJECTION, SOLUTION INTRAMUSCULAR; INTRAVENOUS; SUBCUTANEOUS at 06:10

## 2024-10-16 RX ADMIN — VERAPAMIL HYDROCHLORIDE 80 MG: 80 TABLET ORAL at 09:10

## 2024-10-16 RX ADMIN — FOLIC ACID 1 MG: 1 TABLET ORAL at 08:10

## 2024-10-16 RX ADMIN — ENOXAPARIN SODIUM 40 MG: 40 INJECTION SUBCUTANEOUS at 05:10

## 2024-10-16 RX ADMIN — MORPHINE SULFATE 30 MG: 15 TABLET, FILM COATED, EXTENDED RELEASE ORAL at 09:10

## 2024-10-16 RX ADMIN — HYDROXYUREA 1000 MG: 500 CAPSULE ORAL at 08:10

## 2024-10-16 RX ADMIN — IBUPROFEN 400 MG: 200 TABLET, FILM COATED ORAL at 11:10

## 2024-10-16 RX ADMIN — OXYCODONE HYDROCHLORIDE 15 MG: 10 TABLET ORAL at 11:10

## 2024-10-16 RX ADMIN — SENNOSIDES AND DOCUSATE SODIUM 1 TABLET: 8.6; 5 TABLET ORAL at 08:10

## 2024-10-16 RX ADMIN — HYDROMORPHONE HYDROCHLORIDE 2 MG: 1 INJECTION, SOLUTION INTRAMUSCULAR; INTRAVENOUS; SUBCUTANEOUS at 12:10

## 2024-10-16 RX ADMIN — HYDROMORPHONE HYDROCHLORIDE 2 MG: 1 INJECTION, SOLUTION INTRAMUSCULAR; INTRAVENOUS; SUBCUTANEOUS at 09:10

## 2024-10-16 RX ADMIN — HYDROMORPHONE HYDROCHLORIDE 2 MG: 1 INJECTION, SOLUTION INTRAMUSCULAR; INTRAVENOUS; SUBCUTANEOUS at 03:10

## 2024-10-16 RX ADMIN — THERA TABS 1 TABLET: TAB at 08:10

## 2024-10-16 RX ADMIN — ALPRAZOLAM 0.5 MG: 0.5 TABLET ORAL at 04:10

## 2024-10-16 RX ADMIN — HYDROMORPHONE HYDROCHLORIDE 2 MG: 1 INJECTION, SOLUTION INTRAMUSCULAR; INTRAVENOUS; SUBCUTANEOUS at 08:10

## 2024-10-16 RX ADMIN — AMITRIPTYLINE HYDROCHLORIDE 50 MG: 25 TABLET, FILM COATED ORAL at 09:10

## 2024-10-16 RX ADMIN — OXYCODONE HYDROCHLORIDE 15 MG: 10 TABLET ORAL at 10:10

## 2024-10-16 RX ADMIN — ALPRAZOLAM 0.5 MG: 0.5 TABLET ORAL at 09:10

## 2024-10-16 NOTE — ASSESSMENT & PLAN NOTE
Still uncontrolled but noted to be improving  Resumed home dose MS contin scheduled and oxycodone prn  Dilaudid IV 2mg q3h PRN for breakthrough pain  Continue supplemental oxygen - wean as tolerated  Encourage PO hydration  Continue hydroxyurea, gabapentin, folic acid  IS

## 2024-10-16 NOTE — CARE UPDATE
10/16/24 0909   Patient Assessment/Suction   Level of Consciousness (AVPU) alert   Respiratory Effort Unlabored   Rhythm/Pattern, Respiratory unlabored;pattern regular   PRE-TX-O2   Device (Oxygen Therapy) room air;nasal cannula  (s/b 2lpm)   $ Is the patient on Low Flow Oxygen? Yes   Flow (L/min) (Oxygen Therapy) 2  (s/b - uses as needed)   SpO2 (!) 94 %   Pulse Oximetry Type Intermittent   $ Pulse Oximetry - Multiple Charge Pulse Oximetry - Multiple   Pulse 92   Resp 16   Incentive Spirometer   $ Incentive Spirometer Charges done with encouragement   Administration (IS) mouthpiece utilized   Number of Repetitions (IS) 10   Level Incentive Spirometer (mL) 500   Patient Tolerance (IS) no adverse signs/symptoms present

## 2024-10-16 NOTE — PROGRESS NOTES
[facility]  Progress Note    Patient Name: Sunita Mendez  MRN: [id number]  Patient Class: IP- Inpatient   Admission Date: 10/11/2024  Length of Stay: 3 days  Attending Physician: Po Goodrich MD  Primary Care Provider: Sebastián Chambers MD        Subjective:     Principal Problem:Sickle cell crisis        HPI:  48 year old female with PMH of sickle cell anemia, bilateral PE in 2010 status post 3 months of Lovenox presents to ED due to generalized pain which has been persistent for 2 weeks.  Patient has been taking her home dose of MS Contin and oxycodone without significant improvement.  Also reports intermittent chest pain 2 days ago which has resolved but had persistent shortness of breath and dry cough.   Denies lower extremity edema, fevers, chills, dysuria.  Reports some nausea but no vomiting.  Patient also reports having a headache consistent with her migraines which is usually exacerbated by sickle cell pain crisis.  Patient also reports having vague abdominal pain for over 1 year.  Describes pain is different from her usual sickle cell pain, dull in nature, is generalized and shifting - currently in the LUQ.  Patient was supposed to have a CT abdomen completed by her primary care doctor however her insurance rejected the test.  Labs revealed a D-dimer of 21.07. CTA chest was negative for PE or consolidation, revealed auto infarcted spleen.  Hemoglobin is 8.1 (patient reports hemoglobin is usually in the 7's).  Patient was placed on nasal cannula, received IV fluids and hydromorphone injections with improvement of pain.    Overview/Hospital Course:  48 year old female with history of sickle cell admitted for pain crisis and migraine. Given IV and PO narcotic pain meds as well as IVF. CTA of chest without PE, D-dimer 21.  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  General surgery consulted, no intervention warranted recommends follow up for vaccines with  Infectious Disease or PCP.  Ultrasound of bilateral lower extremities without DVT.  Hgb decreased to 6.5 so given 1u pRBC 10/15.     Interval History: Patient seen and examined on AM harika RAZO. Getting some improvement to pain control but still significant.      Objective:     Vital Signs (Most Recent):  Temp: 98 °F (36.7 °C) (10/16/24 1121)  Pulse: 93 (10/16/24 1121)  Resp: 18 (10/16/24 1222)  BP: 115/75 (10/16/24 1121)  SpO2: 99 % (10/16/24 1121) Vital Signs (24h Range):  Temp:  [97.5 °F (36.4 °C)-98.7 °F (37.1 °C)] 98 °F (36.7 °C)  Pulse:  [] 93  Resp:  [16-19] 18  SpO2:  [93 %-100 %] 99 %  BP: (110-146)/(39-82) 115/75     Weight: 81.6 kg (179 lb 12.8 oz)  Body mass index is 32.89 kg/m².    Intake/Output Summary (Last 24 hours) at 10/16/2024 1505  Last data filed at 10/15/2024 1921  Gross per 24 hour   Intake 578 ml   Output --   Net 578 ml         Physical Exam  Vitals reviewed.   Constitutional:       General: She is not in acute distress.  HENT:      Head: Normocephalic and atraumatic.   Cardiovascular:      Rate and Rhythm: Regular rhythm. Tachycardia present.   Pulmonary:      Effort: Pulmonary effort is normal. No respiratory distress.   Neurological:      General: No focal deficit present.      Mental Status: She is alert and oriented to person, place, and time. Mental status is at baseline.   Psychiatric:         Mood and Affect: Affect normal.         Behavior: Behavior normal.         Thought Content: Thought content normal.             Significant Labs: All pertinent labs within the past 24 hours have been reviewed.    Significant Imaging: I have reviewed all pertinent imaging results/findings within the past 24 hours.    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  Patient is not currently on anticoagulation.  D dimer is 21 - suspect chronically elevated secondary to sickle cell.  BLE doppler us negative   Monitor respiratory status       VTE Risk Mitigation (From admission, onward)           Ordered     enoxaparin injection 40 mg  Daily         10/12/24 0341     IP VTE HIGH RISK PATIENT  Once         10/12/24 0341     Place sequential compression device  Until discontinued         10/12/24 0341                    Discharge Planning   CORRINE: 10/17/2024     Code Status: Full Code   Is the patient medically ready for discharge?:     Reason for patient still in hospital (select all that apply): Patient trending condition and Treatment  Discharge Plan A: Home                  Po Goodrich MD  Department of Hospital Medicine   [facility]

## 2024-10-16 NOTE — SUBJECTIVE & OBJECTIVE
Interval History: Patient seen and examined on AM harika RAZO. Getting some improvement to pain control but still significant.      Objective:     Vital Signs (Most Recent):  Temp: 98 °F (36.7 °C) (10/16/24 1121)  Pulse: 93 (10/16/24 1121)  Resp: 18 (10/16/24 1222)  BP: 115/75 (10/16/24 1121)  SpO2: 99 % (10/16/24 1121) Vital Signs (24h Range):  Temp:  [97.5 °F (36.4 °C)-98.7 °F (37.1 °C)] 98 °F (36.7 °C)  Pulse:  [] 93  Resp:  [16-19] 18  SpO2:  [93 %-100 %] 99 %  BP: (110-146)/(39-82) 115/75     Weight: 81.6 kg (179 lb 12.8 oz)  Body mass index is 32.89 kg/m².    Intake/Output Summary (Last 24 hours) at 10/16/2024 1505  Last data filed at 10/15/2024 1921  Gross per 24 hour   Intake 578 ml   Output --   Net 578 ml         Physical Exam  Vitals reviewed.   Constitutional:       General: She is not in acute distress.  HENT:      Head: Normocephalic and atraumatic.   Cardiovascular:      Rate and Rhythm: Regular rhythm. Tachycardia present.   Pulmonary:      Effort: Pulmonary effort is normal. No respiratory distress.   Neurological:      General: No focal deficit present.      Mental Status: She is alert and oriented to person, place, and time. Mental status is at baseline.   Psychiatric:         Mood and Affect: Affect normal.         Behavior: Behavior normal.         Thought Content: Thought content normal.             Significant Labs: All pertinent labs within the past 24 hours have been reviewed.    Significant Imaging: I have reviewed all pertinent imaging results/findings within the past 24 hours.

## 2024-10-17 LAB
ERYTHROCYTE [DISTWIDTH] IN BLOOD BY AUTOMATED COUNT: 20.8 % (ref 11.5–14.5)
HCT VFR BLD AUTO: 25.1 % (ref 37–48.5)
HGB BLD-MCNC: 8.3 G/DL (ref 12–16)
MCH RBC QN AUTO: 32 PG (ref 27–31)
MCHC RBC AUTO-ENTMCNC: 33.1 G/DL (ref 32–36)
MCV RBC AUTO: 97 FL (ref 82–98)
PLATELET # BLD AUTO: 331 K/UL (ref 150–450)
PMV BLD AUTO: 10.1 FL (ref 9.2–12.9)
RBC # BLD AUTO: 2.59 M/UL (ref 4–5.4)
WBC # BLD AUTO: 15.39 K/UL (ref 3.9–12.7)

## 2024-10-17 PROCEDURE — 12000002 HC ACUTE/MED SURGE SEMI-PRIVATE ROOM

## 2024-10-17 PROCEDURE — 99900035 HC TECH TIME PER 15 MIN (STAT)

## 2024-10-17 PROCEDURE — 63600175 PHARM REV CODE 636 W HCPCS: Performed by: STUDENT IN AN ORGANIZED HEALTH CARE EDUCATION/TRAINING PROGRAM

## 2024-10-17 PROCEDURE — 25000003 PHARM REV CODE 250: Performed by: STUDENT IN AN ORGANIZED HEALTH CARE EDUCATION/TRAINING PROGRAM

## 2024-10-17 PROCEDURE — 99900031 HC PATIENT EDUCATION (STAT)

## 2024-10-17 PROCEDURE — 85027 COMPLETE CBC AUTOMATED: CPT | Performed by: STUDENT IN AN ORGANIZED HEALTH CARE EDUCATION/TRAINING PROGRAM

## 2024-10-17 PROCEDURE — 36415 COLL VENOUS BLD VENIPUNCTURE: CPT | Performed by: STUDENT IN AN ORGANIZED HEALTH CARE EDUCATION/TRAINING PROGRAM

## 2024-10-17 PROCEDURE — 25000003 PHARM REV CODE 250: Performed by: INTERNAL MEDICINE

## 2024-10-17 PROCEDURE — 94761 N-INVAS EAR/PLS OXIMETRY MLT: CPT

## 2024-10-17 PROCEDURE — 94799 UNLISTED PULMONARY SVC/PX: CPT | Mod: XB

## 2024-10-17 RX ORDER — DIPHENHYDRAMINE HYDROCHLORIDE 50 MG/ML
25 INJECTION INTRAMUSCULAR; INTRAVENOUS ONCE
Status: COMPLETED | OUTPATIENT
Start: 2024-10-17 | End: 2024-10-17

## 2024-10-17 RX ORDER — PROCHLORPERAZINE EDISYLATE 5 MG/ML
10 INJECTION INTRAMUSCULAR; INTRAVENOUS ONCE
Status: COMPLETED | OUTPATIENT
Start: 2024-10-17 | End: 2024-10-17

## 2024-10-17 RX ORDER — KETOROLAC TROMETHAMINE 30 MG/ML
30 INJECTION, SOLUTION INTRAMUSCULAR; INTRAVENOUS ONCE
Status: COMPLETED | OUTPATIENT
Start: 2024-10-17 | End: 2024-10-17

## 2024-10-17 RX ADMIN — HYDROMORPHONE HYDROCHLORIDE 2 MG: 1 INJECTION, SOLUTION INTRAMUSCULAR; INTRAVENOUS; SUBCUTANEOUS at 12:10

## 2024-10-17 RX ADMIN — THERA TABS 1 TABLET: TAB at 09:10

## 2024-10-17 RX ADMIN — DIPHENHYDRAMINE HYDROCHLORIDE 25 MG: 50 INJECTION INTRAMUSCULAR; INTRAVENOUS at 01:10

## 2024-10-17 RX ADMIN — KETOROLAC TROMETHAMINE 30 MG: 30 INJECTION, SOLUTION INTRAMUSCULAR; INTRAVENOUS at 01:10

## 2024-10-17 RX ADMIN — MORPHINE SULFATE 30 MG: 15 TABLET, FILM COATED, EXTENDED RELEASE ORAL at 08:10

## 2024-10-17 RX ADMIN — ZOLPIDEM TARTRATE 10 MG: 5 TABLET, COATED ORAL at 12:10

## 2024-10-17 RX ADMIN — AMITRIPTYLINE HYDROCHLORIDE 50 MG: 25 TABLET, FILM COATED ORAL at 09:10

## 2024-10-17 RX ADMIN — HYDROMORPHONE HYDROCHLORIDE 2 MG: 1 INJECTION, SOLUTION INTRAMUSCULAR; INTRAVENOUS; SUBCUTANEOUS at 03:10

## 2024-10-17 RX ADMIN — PROCHLORPERAZINE EDISYLATE 10 MG: 5 INJECTION INTRAMUSCULAR; INTRAVENOUS at 01:10

## 2024-10-17 RX ADMIN — HYDROMORPHONE HYDROCHLORIDE 2 MG: 1 INJECTION, SOLUTION INTRAMUSCULAR; INTRAVENOUS; SUBCUTANEOUS at 09:10

## 2024-10-17 RX ADMIN — MORPHINE SULFATE 30 MG: 15 TABLET, FILM COATED, EXTENDED RELEASE ORAL at 09:10

## 2024-10-17 RX ADMIN — VERAPAMIL HYDROCHLORIDE 80 MG: 80 TABLET ORAL at 10:10

## 2024-10-17 RX ADMIN — HYDROMORPHONE HYDROCHLORIDE 2 MG: 1 INJECTION, SOLUTION INTRAMUSCULAR; INTRAVENOUS; SUBCUTANEOUS at 01:10

## 2024-10-17 RX ADMIN — HYDROMORPHONE HYDROCHLORIDE 2 MG: 1 INJECTION, SOLUTION INTRAMUSCULAR; INTRAVENOUS; SUBCUTANEOUS at 06:10

## 2024-10-17 RX ADMIN — SENNOSIDES AND DOCUSATE SODIUM 1 TABLET: 8.6; 5 TABLET ORAL at 09:10

## 2024-10-17 RX ADMIN — VERAPAMIL HYDROCHLORIDE 80 MG: 80 TABLET ORAL at 09:10

## 2024-10-17 RX ADMIN — ALPRAZOLAM 0.5 MG: 0.5 TABLET ORAL at 10:10

## 2024-10-17 RX ADMIN — HYDROMORPHONE HYDROCHLORIDE 2 MG: 1 INJECTION, SOLUTION INTRAMUSCULAR; INTRAVENOUS; SUBCUTANEOUS at 10:10

## 2024-10-17 RX ADMIN — SENNOSIDES AND DOCUSATE SODIUM 1 TABLET: 8.6; 5 TABLET ORAL at 08:10

## 2024-10-17 RX ADMIN — FOLIC ACID 1 MG: 1 TABLET ORAL at 09:10

## 2024-10-17 RX ADMIN — VERAPAMIL HYDROCHLORIDE 80 MG: 80 TABLET ORAL at 03:10

## 2024-10-17 RX ADMIN — ENOXAPARIN SODIUM 40 MG: 40 INJECTION SUBCUTANEOUS at 03:10

## 2024-10-17 RX ADMIN — HYDROXYUREA 1000 MG: 500 CAPSULE ORAL at 10:10

## 2024-10-17 NOTE — PLAN OF CARE
Met with patient at bedside during rounds. Patient still requiring inpatient medical care at this time. CM following for discharge needs.       10/17/24 133   Discharge Reassessment   Assessment Type Discharge Planning Reassessment   Did the patient's condition or plan change since previous assessment? No   Discharge Plan discussed with: Patient   Communicated CORRINE with patient/caregiver Yes   Discharge Plan A Home   Discharge Plan B Home   DME Needed Upon Discharge  none   Transition of Care Barriers None   Why the patient remains in the hospital Requires continued medical care

## 2024-10-17 NOTE — CARE UPDATE
10/16/24 2031   Patient Assessment/Suction   Level of Consciousness (AVPU) alert   Respiratory Effort Normal;Unlabored   Expansion/Accessory Muscles/Retractions no use of accessory muscles;no retractions   All Lung Fields Breath Sounds diminished   Rhythm/Pattern, Respiratory unlabored;pattern regular   Cough Frequency no cough   PRE-TX-O2   Device (Oxygen Therapy) room air   SpO2 95 %   Pulse Oximetry Type Intermittent   Pulse 87   Resp 18   Incentive Spirometer   $ Incentive Spirometer Charges done with encouragement   Incentive Spirometer Predicted Level (mL) 2500   Administration (IS) mouthpiece utilized   Number of Repetitions (IS) 10   Level Incentive Spirometer (mL) 500   Patient Tolerance (IS) no adverse signs/symptoms present;fair

## 2024-10-17 NOTE — PROGRESS NOTES
[facility] Medicine  Progress Note    Patient Name: Sunita Mendez  MRN: [id number]  Patient Class: IP- Inpatient   Admission Date: 10/11/2024  Length of Stay: 4 days  Attending Physician: Po Goodrich MD  Primary Care Provider: Sebastián Chambers MD        Subjective:     Principal Problem:Sickle cell crisis        HPI:  48 year old female with PMH of sickle cell anemia, bilateral PE in 2010 status post 3 months of Lovenox presents to ED due to generalized pain which has been persistent for 2 weeks.  Patient has been taking her home dose of MS Contin and oxycodone without significant improvement.  Also reports intermittent chest pain 2 days ago which has resolved but had persistent shortness of breath and dry cough.   Denies lower extremity edema, fevers, chills, dysuria.  Reports some nausea but no vomiting.  Patient also reports having a headache consistent with her migraines which is usually exacerbated by sickle cell pain crisis.  Patient also reports having vague abdominal pain for over 1 year.  Describes pain is different from her usual sickle cell pain, dull in nature, is generalized and shifting - currently in the LUQ.  Patient was supposed to have a CT abdomen completed by her primary care doctor however her insurance rejected the test.  Labs revealed a D-dimer of 21.07. CTA chest was negative for PE or consolidation, revealed auto infarcted spleen.  Hemoglobin is 8.1 (patient reports hemoglobin is usually in the 7's).  Patient was placed on nasal cannula, received IV fluids and hydromorphone injections with improvement of pain.    Overview/Hospital Course:  48 year old female with history of sickle cell admitted for pain crisis and migraine. Given IV and PO narcotic pain meds as well as IVF. CTA of chest without PE, D-dimer 21.  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  General surgery consulted, no intervention warranted recommends follow up for vaccines with  Infectious Disease or PCP.  Ultrasound of bilateral lower extremities without DVT.  Hgb decreased to 6.5 so given 1u pRBC 10/15.  Given migraine cocktail with Compazine, Benadryl, Toradol.    Interval History:  Seen on a.m. rounds.  Generally her pain is improving but her migraine seems to persist.      Objective:     Vital Signs (Most Recent):  Temp: 98.6 °F (37 °C) (10/17/24 1112)  Pulse: 90 (10/17/24 1340)  Resp: 19 (10/17/24 1348)  BP: 130/74 (10/17/24 1112)  SpO2: (!) 94 % (10/17/24 1340) Vital Signs (24h Range):  Temp:  [98.1 °F (36.7 °C)-98.6 °F (37 °C)] 98.6 °F (37 °C)  Pulse:  [] 90  Resp:  [16-20] 19  SpO2:  [94 %-97 %] 94 %  BP: (130-148)/(65-78) 130/74     Weight: 81.6 kg (179 lb 12.8 oz)  Body mass index is 32.89 kg/m².    Intake/Output Summary (Last 24 hours) at 10/17/2024 1512  Last data filed at 10/17/2024 1113  Gross per 24 hour   Intake 200 ml   Output 1 ml   Net 199 ml         Physical Exam  Vitals reviewed.   Constitutional:       General: She is not in acute distress.  HENT:      Head: Normocephalic and atraumatic.   Cardiovascular:      Rate and Rhythm: Normal rate and regular rhythm.   Pulmonary:      Effort: Pulmonary effort is normal. No respiratory distress.   Neurological:      General: No focal deficit present.      Mental Status: She is alert and oriented to person, place, and time. Mental status is at baseline.   Psychiatric:         Mood and Affect: Affect normal.         Behavior: Behavior normal.         Thought Content: Thought content normal.             Significant Labs: All pertinent labs within the past 24 hours have been reviewed.    Significant Imaging: I have reviewed all pertinent imaging results/findings within the past 24 hours.    Assessment/Plan:      * Sickle cell crisis  Continuing to improve.  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  Patient is not currently on anticoagulation.  D dimer is 21 - suspect chronically elevated secondary to sickle cell.  BLE doppler us negative   Monitor respiratory status       VTE Risk Mitigation (From admission, onward)           Ordered     enoxaparin injection 40 mg  Daily         10/12/24 0341     IP VTE HIGH RISK PATIENT  Once         10/12/24 0341     Place sequential compression device  Until discontinued         10/12/24 0341                    Discharge Planning   CORRINE: 10/19/2024     Code Status: Full Code   Is the patient medically ready for discharge?:     Reason for patient still in hospital (select all that apply): Patient trending condition and Treatment  Discharge Plan A: Home                  Po Goodrich MD  Department of Hospital Medicine   [facility]

## 2024-10-17 NOTE — SUBJECTIVE & OBJECTIVE
Interval History:  Seen on a.m. rounds.  Generally her pain is improving but her migraine seems to persist.      Objective:     Vital Signs (Most Recent):  Temp: 98.6 °F (37 °C) (10/17/24 1112)  Pulse: 90 (10/17/24 1340)  Resp: 19 (10/17/24 1348)  BP: 130/74 (10/17/24 1112)  SpO2: (!) 94 % (10/17/24 1340) Vital Signs (24h Range):  Temp:  [98.1 °F (36.7 °C)-98.6 °F (37 °C)] 98.6 °F (37 °C)  Pulse:  [] 90  Resp:  [16-20] 19  SpO2:  [94 %-97 %] 94 %  BP: (130-148)/(65-78) 130/74     Weight: 81.6 kg (179 lb 12.8 oz)  Body mass index is 32.89 kg/m².    Intake/Output Summary (Last 24 hours) at 10/17/2024 1512  Last data filed at 10/17/2024 1113  Gross per 24 hour   Intake 200 ml   Output 1 ml   Net 199 ml         Physical Exam  Vitals reviewed.   Constitutional:       General: She is not in acute distress.  HENT:      Head: Normocephalic and atraumatic.   Cardiovascular:      Rate and Rhythm: Normal rate and regular rhythm.   Pulmonary:      Effort: Pulmonary effort is normal. No respiratory distress.   Neurological:      General: No focal deficit present.      Mental Status: She is alert and oriented to person, place, and time. Mental status is at baseline.   Psychiatric:         Mood and Affect: Affect normal.         Behavior: Behavior normal.         Thought Content: Thought content normal.             Significant Labs: All pertinent labs within the past 24 hours have been reviewed.    Significant Imaging: I have reviewed all pertinent imaging results/findings within the past 24 hours.

## 2024-10-17 NOTE — ASSESSMENT & PLAN NOTE
Continuing to improve.  Addressing migraine separately  Resumed home dose MS contin scheduled and oxycodone prn  Dilaudid IV 2mg q3h PRN for breakthrough pain  Continue supplemental oxygen - wean as tolerated  Encourage PO hydration  Continue hydroxyurea, gabapentin, folic acid  IS

## 2024-10-17 NOTE — PLAN OF CARE
Problem: Adult Inpatient Plan of Care  Goal: Plan of Care Review  Outcome: Progressing  Goal: Patient-Specific Goal (Individualized)  Outcome: Progressing  Goal: Absence of Hospital-Acquired Illness or Injury  Outcome: Progressing  Goal: Optimal Comfort and Wellbeing  Outcome: Progressing  Goal: Readiness for Transition of Care  Outcome: Progressing     Problem: Pneumonia  Goal: Fluid Balance  Outcome: Progressing  Goal: Resolution of Infection Signs and Symptoms  Outcome: Progressing  Goal: Effective Oxygenation and Ventilation  Outcome: Progressing     Problem: Sickle Cell Disease  Goal: Optimal Cerebral Tissue Perfusion  Outcome: Progressing  Goal: Optimal Coping with Sickle Cell Disease  Outcome: Progressing  Goal: Effective Tissue Perfusion  Outcome: Progressing  Goal: Absence of Infection Signs and Symptoms  Outcome: Progressing  Goal: Optimal Pain Control and Function  Outcome: Progressing  Goal: Optimal Oxygenation  Outcome: Progressing     Problem: Fall Injury Risk  Goal: Absence of Fall and Fall-Related Injury  Outcome: Progressing

## 2024-10-18 PROBLEM — K59.00 CONSTIPATION: Status: RESOLVED | Noted: 2023-04-02 | Resolved: 2024-10-18

## 2024-10-18 PROBLEM — R50.9 FEVER: Status: ACTIVE | Noted: 2024-10-18

## 2024-10-18 PROBLEM — D72.829 LEUKOCYTOSIS: Status: RESOLVED | Noted: 2024-03-27 | Resolved: 2024-10-18

## 2024-10-18 PROBLEM — J18.9 PNEUMONIA: Status: RESOLVED | Noted: 2020-08-18 | Resolved: 2024-10-18

## 2024-10-18 PROBLEM — R09.02 HYPOXIA: Status: RESOLVED | Noted: 2020-08-18 | Resolved: 2024-10-18

## 2024-10-18 LAB
ERYTHROCYTE [DISTWIDTH] IN BLOOD BY AUTOMATED COUNT: 20.5 % (ref 11.5–14.5)
HCT VFR BLD AUTO: 24.6 % (ref 37–48.5)
HGB BLD-MCNC: 8 G/DL (ref 12–16)
MCH RBC QN AUTO: 31.7 PG (ref 27–31)
MCHC RBC AUTO-ENTMCNC: 32.5 G/DL (ref 32–36)
MCV RBC AUTO: 98 FL (ref 82–98)
PLATELET # BLD AUTO: 360 K/UL (ref 150–450)
PMV BLD AUTO: 10.2 FL (ref 9.2–12.9)
RBC # BLD AUTO: 2.52 M/UL (ref 4–5.4)
WBC # BLD AUTO: 10.82 K/UL (ref 3.9–12.7)

## 2024-10-18 PROCEDURE — 63600175 PHARM REV CODE 636 W HCPCS: Performed by: STUDENT IN AN ORGANIZED HEALTH CARE EDUCATION/TRAINING PROGRAM

## 2024-10-18 PROCEDURE — 94761 N-INVAS EAR/PLS OXIMETRY MLT: CPT

## 2024-10-18 PROCEDURE — 25000003 PHARM REV CODE 250: Performed by: STUDENT IN AN ORGANIZED HEALTH CARE EDUCATION/TRAINING PROGRAM

## 2024-10-18 PROCEDURE — 94799 UNLISTED PULMONARY SVC/PX: CPT

## 2024-10-18 PROCEDURE — 85027 COMPLETE CBC AUTOMATED: CPT | Performed by: STUDENT IN AN ORGANIZED HEALTH CARE EDUCATION/TRAINING PROGRAM

## 2024-10-18 PROCEDURE — 25000003 PHARM REV CODE 250: Performed by: INTERNAL MEDICINE

## 2024-10-18 PROCEDURE — 99900031 HC PATIENT EDUCATION (STAT)

## 2024-10-18 PROCEDURE — 99900035 HC TECH TIME PER 15 MIN (STAT)

## 2024-10-18 PROCEDURE — 12000002 HC ACUTE/MED SURGE SEMI-PRIVATE ROOM

## 2024-10-18 PROCEDURE — 36415 COLL VENOUS BLD VENIPUNCTURE: CPT | Performed by: STUDENT IN AN ORGANIZED HEALTH CARE EDUCATION/TRAINING PROGRAM

## 2024-10-18 PROCEDURE — 27000221 HC OXYGEN, UP TO 24 HOURS

## 2024-10-18 RX ADMIN — THERA TABS 1 TABLET: TAB at 07:10

## 2024-10-18 RX ADMIN — VERAPAMIL HYDROCHLORIDE 80 MG: 80 TABLET ORAL at 07:10

## 2024-10-18 RX ADMIN — SENNOSIDES AND DOCUSATE SODIUM 1 TABLET: 8.6; 5 TABLET ORAL at 07:10

## 2024-10-18 RX ADMIN — ALPRAZOLAM 0.5 MG: 0.5 TABLET ORAL at 09:10

## 2024-10-18 RX ADMIN — HYDROXYUREA 1000 MG: 500 CAPSULE ORAL at 07:10

## 2024-10-18 RX ADMIN — VERAPAMIL HYDROCHLORIDE 80 MG: 80 TABLET ORAL at 03:10

## 2024-10-18 RX ADMIN — FOLIC ACID 1 MG: 1 TABLET ORAL at 07:10

## 2024-10-18 RX ADMIN — MORPHINE SULFATE 30 MG: 15 TABLET, FILM COATED, EXTENDED RELEASE ORAL at 09:10

## 2024-10-18 RX ADMIN — HYDROMORPHONE HYDROCHLORIDE 2 MG: 1 INJECTION, SOLUTION INTRAMUSCULAR; INTRAVENOUS; SUBCUTANEOUS at 06:10

## 2024-10-18 RX ADMIN — HYDROMORPHONE HYDROCHLORIDE 2 MG: 1 INJECTION, SOLUTION INTRAMUSCULAR; INTRAVENOUS; SUBCUTANEOUS at 01:10

## 2024-10-18 RX ADMIN — OXYCODONE HYDROCHLORIDE 15 MG: 10 TABLET ORAL at 03:10

## 2024-10-18 RX ADMIN — SENNOSIDES AND DOCUSATE SODIUM 1 TABLET: 8.6; 5 TABLET ORAL at 09:10

## 2024-10-18 RX ADMIN — ENOXAPARIN SODIUM 40 MG: 40 INJECTION SUBCUTANEOUS at 05:10

## 2024-10-18 RX ADMIN — MORPHINE SULFATE 30 MG: 15 TABLET, FILM COATED, EXTENDED RELEASE ORAL at 07:10

## 2024-10-18 RX ADMIN — AMITRIPTYLINE HYDROCHLORIDE 50 MG: 25 TABLET, FILM COATED ORAL at 09:10

## 2024-10-18 NOTE — SUBJECTIVE & OBJECTIVE
Interval History:  Seen on a.m. rounds.  Migraine mostly resolved status post medication cocktail yesterday.  Pain is overall improved.  We discussed trying to get through today focusing on oral pain meds with IV pain medicine in background for breakthrough if needed.  At 100.5 fever last night.  She thinks she had a cough that is now resolved.  Otherwise denies chills/fevers, congestion, shortness of breath, new myalgias.      Objective:     Vital Signs (Most Recent):  Temp: 98.2 °F (36.8 °C) (10/18/24 1146)  Pulse: 85 (10/18/24 1146)  Resp: 16 (10/18/24 1146)  BP: (!) 110/42 (10/18/24 1146)  SpO2: 96 % (10/18/24 1146) Vital Signs (24h Range):  Temp:  [98.2 °F (36.8 °C)-100.5 °F (38.1 °C)] 98.2 °F (36.8 °C)  Pulse:  [] 85  Resp:  [14-22] 16  SpO2:  [94 %-100 %] 96 %  BP: (104-146)/(42-82) 110/42     Weight: 81.6 kg (179 lb 12.8 oz)  Body mass index is 32.89 kg/m².    Intake/Output Summary (Last 24 hours) at 10/18/2024 1253  Last data filed at 10/18/2024 1147  Gross per 24 hour   Intake 1200 ml   Output 0 ml   Net 1200 ml         Physical Exam  Vitals reviewed.   Constitutional:       General: She is not in acute distress.  HENT:      Head: Normocephalic and atraumatic.   Cardiovascular:      Rate and Rhythm: Normal rate and regular rhythm.   Pulmonary:      Effort: Pulmonary effort is normal. No respiratory distress.   Neurological:      General: No focal deficit present.      Mental Status: She is alert and oriented to person, place, and time. Mental status is at baseline.   Psychiatric:         Mood and Affect: Affect normal.         Behavior: Behavior normal.         Thought Content: Thought content normal.             Significant Labs: All pertinent labs within the past 24 hours have been reviewed.    Significant Imaging: I have reviewed all pertinent imaging results/findings within the past 24 hours.

## 2024-10-18 NOTE — CARE UPDATE
10/17/24 2012   Patient Assessment/Suction   Level of Consciousness (AVPU) alert   Respiratory Effort Normal   Expansion/Accessory Muscles/Retractions no use of accessory muscles   Rhythm/Pattern, Respiratory unlabored   Cough Frequency no cough   PRE-TX-O2   Device (Oxygen Therapy) room air   SpO2 (!) 94 %   Pulse Oximetry Type Intermittent   $ Pulse Oximetry - Multiple Charge Pulse Oximetry - Multiple   Pulse 104   Resp 19   Incentive Spirometer   $ Incentive Spirometer Charges done with encouragement   Incentive Spirometer Predicted Level (mL) 2500   Administration (IS) instruction provided, follow-up   Number of Repetitions (IS) 100   Level Incentive Spirometer (mL) 750   Patient Tolerance (IS) poor   Education   $ Education Incentive Spirometry;15 min   Respiratory Evaluation   $ Care Plan Tech Time 15 min

## 2024-10-18 NOTE — PLAN OF CARE
Problem: Sickle Cell Disease  Goal: Optimal Coping with Sickle Cell Disease  Outcome: Progressing  Intervention: Support Psychosocial Adjustment  Flowsheets (Taken 10/18/2024 0515)  Supportive Measures:   active listening utilized   self-care encouraged   verbalization of feelings encouraged   relaxation techniques promoted  Family/Support System Care: support provided  Goal: Optimal Pain Control and Function  Outcome: Progressing  Intervention: Manage Acute Pain  Flowsheets (Taken 10/18/2024 0515)  Pain Management Interventions:   care clustered   pillow support provided   quiet environment facilitated   relaxation techniques promoted   Family support noted, Mother bringing in food from home and children visiting. Able to rest during night w/ PRN meds for pain and anxiety administered and effective.

## 2024-10-18 NOTE — CARE UPDATE
10/18/24 0741   Patient Assessment/Suction   Level of Consciousness (AVPU) alert   Respiratory Effort Unlabored   Expansion/Accessory Muscles/Retractions expansion symmetric;no retractions;no use of accessory muscles   Rhythm/Pattern, Respiratory depth regular;pattern regular;unlabored   PRE-TX-O2   Device (Oxygen Therapy) room air   SpO2 97 %   Pulse Oximetry Type Intermittent   $ Pulse Oximetry - Multiple Charge Pulse Oximetry - Multiple   Pulse 104   Resp 20   Incentive Spirometer   $ Incentive Spirometer Charges done with encouragement   Administration (IS) self-administered   Number of Repetitions (IS) 10   Level Incentive Spirometer (mL) 1500   Patient Tolerance (IS) good   Education   $ Education Incentive Spirometry;15 min

## 2024-10-18 NOTE — ASSESSMENT & PLAN NOTE
CTA chest is negative.  Patient is not currently on anticoagulation.  D dimer is 21 - suspect chronically elevated secondary to sickle cell.  BLE doppler us negative   -Monitor respiratory status

## 2024-10-18 NOTE — ASSESSMENT & PLAN NOTE
Much improved status post Migraine cocktail with Benadryl, Compazine, Toradol  -Continue verapamil and amitriptyline  -Pain meds per sickle cell crisis

## 2024-10-18 NOTE — ASSESSMENT & PLAN NOTE
Continuing to improve.  Now trying to focus on p.o. meds  -continue home dose MS contin scheduled and oxycodone prn  -Dilaudid IV 2mg q3h PRN for breakthrough pain  -Continue supplemental oxygen - wean as tolerated  -Encourage PO hydration  -Continue hydroxyurea, gabapentin, folic acid  -IS

## 2024-10-18 NOTE — PROGRESS NOTES
[facility] Medicine  Progress Note    Patient Name: Sunita Mendez  MRN: [id number]  Patient Class: IP- Inpatient   Admission Date: 10/11/2024  Length of Stay: 5 days  Attending Physician: Po Goodrich MD  Primary Care Provider: Sebasitán Chambers MD        Subjective:     Principal Problem:Sickle cell crisis        HPI:  48 year old female with PMH of sickle cell anemia, bilateral PE in 2010 status post 3 months of Lovenox presents to ED due to generalized pain which has been persistent for 2 weeks.  Patient has been taking her home dose of MS Contin and oxycodone without significant improvement.  Also reports intermittent chest pain 2 days ago which has resolved but had persistent shortness of breath and dry cough.   Denies lower extremity edema, fevers, chills, dysuria.  Reports some nausea but no vomiting.  Patient also reports having a headache consistent with her migraines which is usually exacerbated by sickle cell pain crisis.  Patient also reports having vague abdominal pain for over 1 year.  Describes pain is different from her usual sickle cell pain, dull in nature, is generalized and shifting - currently in the LUQ.  Patient was supposed to have a CT abdomen completed by her primary care doctor however her insurance rejected the test.  Labs revealed a D-dimer of 21.07. CTA chest was negative for PE or consolidation, revealed auto infarcted spleen.  Hemoglobin is 8.1 (patient reports hemoglobin is usually in the 7's).  Patient was placed on nasal cannula, received IV fluids and hydromorphone injections with improvement of pain.    Overview/Hospital Course:  48 year old female with history of sickle cell admitted for pain crisis and migraine. Given IV and PO narcotic pain meds as well as IVF. CTA of chest without PE, D-dimer 21.  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  General surgery consulted, no intervention warranted recommends follow up for vaccines with  Infectious Disease or PCP.  Ultrasound of bilateral lower extremities without DVT.  Hgb decreased to 6.5 so given 1u pRBC 10/15.  Given migraine cocktail with Compazine, Benadryl, Toradol.    Interval History:  Seen on a.m. rounds.  Migraine mostly resolved status post medication cocktail yesterday.  Pain is overall improved.  We discussed trying to get through today focusing on oral pain meds with IV pain medicine in background for breakthrough if needed.  Had 100.5 fever last night.  She thinks she had a cough that is now resolved.  Otherwise denies chills/fevers, congestion, shortness of breath, new myalgias, dysuria.      Objective:     Vital Signs (Most Recent):  Temp: 98.2 °F (36.8 °C) (10/18/24 1146)  Pulse: 85 (10/18/24 1146)  Resp: 16 (10/18/24 1146)  BP: (!) 110/42 (10/18/24 1146)  SpO2: 96 % (10/18/24 1146) Vital Signs (24h Range):  Temp:  [98.2 °F (36.8 °C)-100.5 °F (38.1 °C)] 98.2 °F (36.8 °C)  Pulse:  [] 85  Resp:  [14-22] 16  SpO2:  [94 %-100 %] 96 %  BP: (104-146)/(42-82) 110/42     Weight: 81.6 kg (179 lb 12.8 oz)  Body mass index is 32.89 kg/m².    Intake/Output Summary (Last 24 hours) at 10/18/2024 1253  Last data filed at 10/18/2024 1147  Gross per 24 hour   Intake 1200 ml   Output 0 ml   Net 1200 ml         Physical Exam  Vitals reviewed.   Constitutional:       General: She is not in acute distress.  HENT:      Head: Normocephalic and atraumatic.   Cardiovascular:      Rate and Rhythm: Normal rate and regular rhythm.   Pulmonary:      Effort: Pulmonary effort is normal. No respiratory distress.   Neurological:      General: No focal deficit present.      Mental Status: She is alert and oriented to person, place, and time. Mental status is at baseline.   Psychiatric:         Mood and Affect: Affect normal.         Behavior: Behavior normal.         Thought Content: Thought content normal.             Significant Labs: All pertinent labs within the past 24 hours have been  reviewed.    Significant Imaging: I have reviewed all pertinent imaging results/findings within the past 24 hours.    Assessment/Plan:      * Sickle cell crisis  Continuing to improve.  Now trying to focus on p.o. 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  Patient is not currently on anticoagulation.  D dimer is 21 - suspect chronically elevated secondary to sickle cell.  BLE doppler us negative   -Monitor respiratory status       VTE Risk Mitigation (From admission, onward)           Ordered     enoxaparin injection 40 mg  Daily         10/12/24 0341     IP VTE HIGH RISK PATIENT  Once         10/12/24 0341     Place sequential compression device  Until discontinued         10/12/24 0341                    Discharge Planning   CORRINE: 10/19/2024     Code Status: Full Code   Is the patient medically ready for discharge?:     Reason for patient still in hospital (select all that apply): Patient trending condition and Treatment  Discharge Plan A: Home                  Po Goodrich MD  Department of Hospital Medicine   [facility]  [facility]

## 2024-10-18 NOTE — ASSESSMENT & PLAN NOTE
Suspecting atelectasis versus viral  -continue IS   -trend temperature  -consider swabs for COVID/flu if recurrent, could consider a respiratory viral panel as well

## 2024-10-19 PROBLEM — I10 HYPERTENSION: Status: ACTIVE | Noted: 2024-10-19

## 2024-10-19 LAB
ALBUMIN SERPL BCP-MCNC: 3.9 G/DL (ref 3.5–5.2)
ALP SERPL-CCNC: 92 U/L (ref 55–135)
ALT SERPL W/O P-5'-P-CCNC: 23 U/L (ref 10–44)
ANION GAP SERPL CALC-SCNC: 4 MMOL/L (ref 8–16)
AST SERPL-CCNC: 37 U/L (ref 10–40)
BILIRUB SERPL-MCNC: 1.8 MG/DL (ref 0.1–1)
BUN SERPL-MCNC: 8 MG/DL (ref 6–20)
CALCIUM SERPL-MCNC: 9.5 MG/DL (ref 8.7–10.5)
CHLORIDE SERPL-SCNC: 97 MMOL/L (ref 95–110)
CO2 SERPL-SCNC: 32 MMOL/L (ref 23–29)
CREAT SERPL-MCNC: 0.5 MG/DL (ref 0.5–1.4)
ERYTHROCYTE [DISTWIDTH] IN BLOOD BY AUTOMATED COUNT: 20 % (ref 11.5–14.5)
EST. GFR  (NO RACE VARIABLE): >60 ML/MIN/1.73 M^2
ESTIMATED AVG GLUCOSE: 77 MG/DL (ref 68–131)
GLUCOSE SERPL-MCNC: 173 MG/DL (ref 70–110)
HBA1C MFR BLD: 4.3 % (ref 4.5–6.2)
HCT VFR BLD AUTO: 26.9 % (ref 37–48.5)
HGB BLD-MCNC: 8.9 G/DL (ref 12–16)
MCH RBC QN AUTO: 32.7 PG (ref 27–31)
MCHC RBC AUTO-ENTMCNC: 33.1 G/DL (ref 32–36)
MCV RBC AUTO: 99 FL (ref 82–98)
PLATELET # BLD AUTO: 340 K/UL (ref 150–450)
PMV BLD AUTO: 10.4 FL (ref 9.2–12.9)
POTASSIUM SERPL-SCNC: 4 MMOL/L (ref 3.5–5.1)
PROT SERPL-MCNC: 7.9 G/DL (ref 6–8.4)
RBC # BLD AUTO: 2.72 M/UL (ref 4–5.4)
SODIUM SERPL-SCNC: 133 MMOL/L (ref 136–145)
T4 FREE SERPL-MCNC: 0.46 NG/DL (ref 0.71–1.51)
TSH SERPL DL<=0.005 MIU/L-ACNC: 6.45 UIU/ML (ref 0.34–5.6)
WBC # BLD AUTO: 12.47 K/UL (ref 3.9–12.7)

## 2024-10-19 PROCEDURE — 63600175 PHARM REV CODE 636 W HCPCS: Performed by: STUDENT IN AN ORGANIZED HEALTH CARE EDUCATION/TRAINING PROGRAM

## 2024-10-19 PROCEDURE — 25000003 PHARM REV CODE 250: Performed by: STUDENT IN AN ORGANIZED HEALTH CARE EDUCATION/TRAINING PROGRAM

## 2024-10-19 PROCEDURE — 36415 COLL VENOUS BLD VENIPUNCTURE: CPT | Performed by: STUDENT IN AN ORGANIZED HEALTH CARE EDUCATION/TRAINING PROGRAM

## 2024-10-19 PROCEDURE — 80053 COMPREHEN METABOLIC PANEL: CPT | Performed by: STUDENT IN AN ORGANIZED HEALTH CARE EDUCATION/TRAINING PROGRAM

## 2024-10-19 PROCEDURE — 25000003 PHARM REV CODE 250: Performed by: FAMILY MEDICINE

## 2024-10-19 PROCEDURE — 99900031 HC PATIENT EDUCATION (STAT)

## 2024-10-19 PROCEDURE — 94799 UNLISTED PULMONARY SVC/PX: CPT

## 2024-10-19 PROCEDURE — 27000221 HC OXYGEN, UP TO 24 HOURS

## 2024-10-19 PROCEDURE — 94761 N-INVAS EAR/PLS OXIMETRY MLT: CPT

## 2024-10-19 PROCEDURE — 99900035 HC TECH TIME PER 15 MIN (STAT)

## 2024-10-19 PROCEDURE — 12000002 HC ACUTE/MED SURGE SEMI-PRIVATE ROOM

## 2024-10-19 PROCEDURE — 85027 COMPLETE CBC AUTOMATED: CPT | Performed by: STUDENT IN AN ORGANIZED HEALTH CARE EDUCATION/TRAINING PROGRAM

## 2024-10-19 PROCEDURE — 63600175 PHARM REV CODE 636 W HCPCS: Performed by: FAMILY MEDICINE

## 2024-10-19 PROCEDURE — 84443 ASSAY THYROID STIM HORMONE: CPT | Performed by: STUDENT IN AN ORGANIZED HEALTH CARE EDUCATION/TRAINING PROGRAM

## 2024-10-19 PROCEDURE — 25000003 PHARM REV CODE 250: Performed by: INTERNAL MEDICINE

## 2024-10-19 PROCEDURE — 83036 HEMOGLOBIN GLYCOSYLATED A1C: CPT | Performed by: STUDENT IN AN ORGANIZED HEALTH CARE EDUCATION/TRAINING PROGRAM

## 2024-10-19 PROCEDURE — 84439 ASSAY OF FREE THYROXINE: CPT | Performed by: STUDENT IN AN ORGANIZED HEALTH CARE EDUCATION/TRAINING PROGRAM

## 2024-10-19 RX ORDER — VERAPAMIL HYDROCHLORIDE 80 MG/1
80 TABLET ORAL 2 TIMES DAILY
Status: DISCONTINUED | OUTPATIENT
Start: 2024-10-19 | End: 2024-10-20

## 2024-10-19 RX ORDER — VERAPAMIL HYDROCHLORIDE 80 MG/1
80 TABLET ORAL 2 TIMES DAILY
Qty: 60 TABLET | Refills: 0 | Status: CANCELLED | OUTPATIENT
Start: 2024-10-19 | End: 2024-11-18

## 2024-10-19 RX ORDER — PROCHLORPERAZINE EDISYLATE 5 MG/ML
10 INJECTION INTRAMUSCULAR; INTRAVENOUS ONCE
Status: COMPLETED | OUTPATIENT
Start: 2024-10-19 | End: 2024-10-19

## 2024-10-19 RX ORDER — DIPHENHYDRAMINE HYDROCHLORIDE 50 MG/ML
25 INJECTION INTRAMUSCULAR; INTRAVENOUS ONCE
Status: COMPLETED | OUTPATIENT
Start: 2024-10-19 | End: 2024-10-19

## 2024-10-19 RX ORDER — POLYETHYLENE GLYCOL 3350 17 G/17G
17 POWDER, FOR SOLUTION ORAL DAILY
Status: DISCONTINUED | OUTPATIENT
Start: 2024-10-19 | End: 2024-10-20 | Stop reason: HOSPADM

## 2024-10-19 RX ORDER — PANTOPRAZOLE SODIUM 40 MG/1
40 TABLET, DELAYED RELEASE ORAL DAILY
Status: DISCONTINUED | OUTPATIENT
Start: 2024-10-19 | End: 2024-10-20 | Stop reason: HOSPADM

## 2024-10-19 RX ORDER — HYDROMORPHONE HYDROCHLORIDE 2 MG/1
2 TABLET ORAL
Status: DISCONTINUED | OUTPATIENT
Start: 2024-10-19 | End: 2024-10-20 | Stop reason: HOSPADM

## 2024-10-19 RX ORDER — KETOROLAC TROMETHAMINE 30 MG/ML
30 INJECTION, SOLUTION INTRAMUSCULAR; INTRAVENOUS ONCE
Status: COMPLETED | OUTPATIENT
Start: 2024-10-19 | End: 2024-10-19

## 2024-10-19 RX ADMIN — POLYETHYLENE GLYCOL 3350 17 G: 17 POWDER, FOR SOLUTION ORAL at 05:10

## 2024-10-19 RX ADMIN — HYDROMORPHONE HYDROCHLORIDE 2 MG: 2 TABLET ORAL at 06:10

## 2024-10-19 RX ADMIN — ALPRAZOLAM 0.5 MG: 0.5 TABLET ORAL at 07:10

## 2024-10-19 RX ADMIN — MORPHINE SULFATE 30 MG: 15 TABLET, FILM COATED, EXTENDED RELEASE ORAL at 08:10

## 2024-10-19 RX ADMIN — SENNOSIDES AND DOCUSATE SODIUM 1 TABLET: 8.6; 5 TABLET ORAL at 08:10

## 2024-10-19 RX ADMIN — DIPHENHYDRAMINE HYDROCHLORIDE 25 MG: 50 INJECTION INTRAMUSCULAR; INTRAVENOUS at 12:10

## 2024-10-19 RX ADMIN — HYDROMORPHONE HYDROCHLORIDE 2 MG: 1 INJECTION, SOLUTION INTRAMUSCULAR; INTRAVENOUS; SUBCUTANEOUS at 04:10

## 2024-10-19 RX ADMIN — HYDROMORPHONE HYDROCHLORIDE 2 MG: 2 TABLET ORAL at 10:10

## 2024-10-19 RX ADMIN — FOLIC ACID 1 MG: 1 TABLET ORAL at 08:10

## 2024-10-19 RX ADMIN — ZOLPIDEM TARTRATE 10 MG: 5 TABLET, COATED ORAL at 08:10

## 2024-10-19 RX ADMIN — HYDROXYUREA 1000 MG: 500 CAPSULE ORAL at 08:10

## 2024-10-19 RX ADMIN — AMITRIPTYLINE HYDROCHLORIDE 50 MG: 25 TABLET, FILM COATED ORAL at 08:10

## 2024-10-19 RX ADMIN — KETOROLAC TROMETHAMINE 30 MG: 30 INJECTION, SOLUTION INTRAMUSCULAR at 12:10

## 2024-10-19 RX ADMIN — ENOXAPARIN SODIUM 40 MG: 40 INJECTION SUBCUTANEOUS at 05:10

## 2024-10-19 RX ADMIN — THERA TABS 1 TABLET: TAB at 08:10

## 2024-10-19 RX ADMIN — PROCHLORPERAZINE EDISYLATE 10 MG: 5 INJECTION INTRAMUSCULAR; INTRAVENOUS at 12:10

## 2024-10-19 RX ADMIN — ONDANSETRON 4 MG: 2 INJECTION INTRAMUSCULAR; INTRAVENOUS at 08:10

## 2024-10-19 RX ADMIN — PANTOPRAZOLE SODIUM 40 MG: 40 TABLET, DELAYED RELEASE ORAL at 10:10

## 2024-10-19 RX ADMIN — OXYCODONE HYDROCHLORIDE 15 MG: 10 TABLET ORAL at 03:10

## 2024-10-19 NOTE — CARE UPDATE
10/18/24 1936   Patient Assessment/Suction   Level of Consciousness (AVPU) alert   Respiratory Effort Normal   Expansion/Accessory Muscles/Retractions no use of accessory muscles   All Lung Fields Breath Sounds clear   Rhythm/Pattern, Respiratory unlabored   Cough Frequency no cough   Skin Integrity   $ Wound Care Tech Time 15 min   Area Observed Left;Right;Behind ear;Cheek;Nares   Skin Appearance without discoloration   PRE-TX-O2   Device (Oxygen Therapy) nasal cannula   $ Is the patient on Low Flow Oxygen? Yes   Flow (L/min) (Oxygen Therapy) 1.5   SpO2 97 %   Pulse Oximetry Type Intermittent   $ Pulse Oximetry - Multiple Charge Pulse Oximetry - Multiple   Pulse 86   Resp 19   Incentive Spirometer   $ Incentive Spirometer Charges done with encouragement   Incentive Spirometer Predicted Level (mL) 1500   Administration (IS) instruction provided, follow-up   Number of Repetitions (IS) 10   Level Incentive Spirometer (mL) 1500   Patient Tolerance (IS) good   Education   $ Education Incentive Spirometry;Oxygen;15 min   Respiratory Evaluation   $ Care Plan Tech Time 15 min

## 2024-10-19 NOTE — PLAN OF CARE
Problem: Adult Inpatient Plan of Care  Goal: Plan of Care Review  Outcome: Progressing  Goal: Patient-Specific Goal (Individualized)  Outcome: Progressing  Goal: Absence of Hospital-Acquired Illness or Injury  Outcome: Progressing  Goal: Optimal Comfort and Wellbeing  Outcome: Progressing  Goal: Readiness for Transition of Care  Outcome: Progressing     Problem: Pneumonia  Goal: Fluid Balance  Outcome: Progressing  Goal: Resolution of Infection Signs and Symptoms  Outcome: Progressing  Goal: Effective Oxygenation and Ventilation  Outcome: Progressing     Problem: Sickle Cell Disease  Goal: Optimal Cerebral Tissue Perfusion  Outcome: Progressing  Goal: Optimal Coping with Sickle Cell Disease  Outcome: Progressing  Goal: Effective Tissue Perfusion  Outcome: Progressing  Goal: Absence of Infection Signs and Symptoms  Outcome: Progressing  Goal: Optimal Pain Control and Function  Outcome: Progressing  Goal: Optimal Oxygenation  Outcome: Progressing     Problem: Fall Injury Risk  Goal: Absence of Fall and Fall-Related Injury  Outcome: Progressing     Problem: Anxiety  Goal: Anxiety Reduction or Resolution  Outcome: Progressing

## 2024-10-19 NOTE — PT/OT/SLP PROGRESS
Occupational Therapy      Patient Name:  Sunita Mendez   MRN:  [id number]    Patient not seen today secondary to Pain. Will follow-up tomorrow.    10/19/2024

## 2024-10-19 NOTE — CARE UPDATE
10/19/24 0839   Patient Assessment/Suction   Level of Consciousness (AVPU) alert   Respiratory Effort Normal;Unlabored   PRE-TX-O2   Device (Oxygen Therapy) nasal cannula   $ Is the patient on Low Flow Oxygen? Yes   Flow (L/min) (Oxygen Therapy) 2   SpO2 100 %   Pulse Oximetry Type Intermittent   $ Pulse Oximetry - Multiple Charge Pulse Oximetry - Multiple   Pulse 80   Resp 16   Incentive Spirometer   $ Incentive Spirometer Charges   (pt nauseated and in pain)

## 2024-10-19 NOTE — PROGRESS NOTES
[facility] Medicine  Progress Note    Patient Name: Sunita Mendez  MRN: [id number]  Patient Class: IP- Inpatient   Admission Date: 10/11/2024  Length of Stay: 6 days  Attending Physician: Lavell Coburn DO  Primary Care Provider: Sebastián Chambers MD        Subjective:     Principal Problem:Sickle cell crisis        HPI:  48 year old female with PMH of sickle cell anemia, bilateral PE in 2010 status post 3 months of Lovenox presents to ED due to generalized pain which has been persistent for 2 weeks.  Patient has been taking her home dose of MS Contin and oxycodone without significant improvement.  Also reports intermittent chest pain 2 days ago which has resolved but had persistent shortness of breath and dry cough.   Denies lower extremity edema, fevers, chills, dysuria.  Reports some nausea but no vomiting.  Patient also reports having a headache consistent with her migraines which is usually exacerbated by sickle cell pain crisis.  Patient also reports having vague abdominal pain for over 1 year.  Describes pain is different from her usual sickle cell pain, dull in nature, is generalized and shifting - currently in the LUQ.  Patient was supposed to have a CT abdomen completed by her primary care doctor however her insurance rejected the test.  Labs revealed a D-dimer of 21.07. CTA chest was negative for PE or consolidation, revealed auto infarcted spleen.  Hemoglobin is 8.1 (patient reports hemoglobin is usually in the 7's).  Patient was placed on nasal cannula, received IV fluids and hydromorphone injections with improvement of pain.    Overview/Hospital Course:  48 year old female with history of sickle cell admitted for pain crisis and migraine. Given IV and PO narcotic pain meds as well as IVF. CTA of chest without PE, D-dimer 21.  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  General surgery consulted, no intervention warranted recommends follow up for vaccines with  Infectious Disease or PCP.  Ultrasound of bilateral lower extremities without DVT.  Hgb decreased to 6.5 so given 1u pRBC 10/15.  Given migraine cocktail with Compazine, Benadryl, Toradol. Due to low BP, verapamil decreased to 80 BID from TID.    Interval History: Patient said she is doing ok except for her headaches.    Review of Systems   All other systems reviewed and are negative.    Objective:     Vital Signs (Most Recent):  Temp: 97.1 °F (36.2 °C) (10/19/24 1145)  Pulse: 81 (10/19/24 1145)  Resp: 17 (10/19/24 1145)  BP: 108/60 (10/19/24 1145)  SpO2: 100 % (10/19/24 1145) Vital Signs (24h Range):  Temp:  [97.1 °F (36.2 °C)-98.7 °F (37.1 °C)] 97.1 °F (36.2 °C)  Pulse:  [78-92] 81  Resp:  [16-20] 17  SpO2:  [96 %-100 %] 100 %  BP: (101-122)/(40-80) 108/60     Weight: 81.6 kg (179 lb 12.8 oz)  Body mass index is 32.89 kg/m².  No intake or output data in the 24 hours ending 10/19/24 1344      Physical Exam  Cardiovascular:      Rate and Rhythm: Normal rate.      Pulses: Normal pulses.   Pulmonary:      Effort: Pulmonary effort is normal.   Abdominal:      General: Abdomen is flat. There is no distension.   Neurological:      Mental Status: She is alert and oriented to person, place, and time.             Significant Labs: All pertinent labs within the past 24 hours have been reviewed.    Significant Imaging: I have reviewed all pertinent imaging results/findings within the past 24 hours.    Assessment/Plan:      * Sickle cell crisis  Continuing to improve.  Now trying to focus on p.o. 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  Patient is not currently on anticoagulation.  D dimer is 21 - suspect chronically elevated secondary to sickle cell.  BLE doppler us negative   -Monitor respiratory status     Hypertension  Due to low BP, verapamil decreased to 80 BID from TID.    Fever  Resolved    Migraine  Much improved status post Migraine cocktail with Benadryl, Compazine, Toradol  -Continue verapamil and amitriptyline  -Pain meds per sickle cell crisis     Sickle cell anemia  Status post 1 unit packed red cell  -CBC daily and transfuse if needed      VTE Risk Mitigation (From admission, onward)           Ordered     enoxaparin injection 40 mg  Daily         10/12/24 0341     IP VTE HIGH RISK PATIENT  Once         10/12/24 0341     Place sequential compression device  Until discontinued         10/12/24 0341                    Discharge Planning   CORRINE: 10/19/2024     Code Status: Full Code   Is the patient medically ready for discharge?:     Reason for patient still in hospital (select all that apply): Patient trending condition  Discharge Plan A: Home                  Lavell Coburn DO  Department of Hospital Medicine   Counts include 234 beds at the [facility]

## 2024-10-19 NOTE — ASSESSMENT & PLAN NOTE
Continuing to improve.  Now trying to focus on p.o. meds  -continue home dose MS contin scheduled and oxycodone prn  -Continue supplemental oxygen - wean as tolerated  -Encourage PO hydration  -Continue hydroxyurea, gabapentin, folic acid

## 2024-10-19 NOTE — SUBJECTIVE & OBJECTIVE
Interval History: Patient said she is doing ok except for her headaches.    Review of Systems   All other systems reviewed and are negative.    Objective:     Vital Signs (Most Recent):  Temp: 97.1 °F (36.2 °C) (10/19/24 1145)  Pulse: 81 (10/19/24 1145)  Resp: 17 (10/19/24 1145)  BP: 108/60 (10/19/24 1145)  SpO2: 100 % (10/19/24 1145) Vital Signs (24h Range):  Temp:  [97.1 °F (36.2 °C)-98.7 °F (37.1 °C)] 97.1 °F (36.2 °C)  Pulse:  [78-92] 81  Resp:  [16-20] 17  SpO2:  [96 %-100 %] 100 %  BP: (101-122)/(40-80) 108/60     Weight: 81.6 kg (179 lb 12.8 oz)  Body mass index is 32.89 kg/m².  No intake or output data in the 24 hours ending 10/19/24 1331      Physical Exam  Cardiovascular:      Rate and Rhythm: Normal rate.      Pulses: Normal pulses.   Pulmonary:      Effort: Pulmonary effort is normal.   Abdominal:      General: Abdomen is flat. There is no distension.   Neurological:      Mental Status: She is alert and oriented to person, place, and time.             Significant Labs: All pertinent labs within the past 24 hours have been reviewed.    Significant Imaging: I have reviewed all pertinent imaging results/findings within the past 24 hours.

## 2024-10-19 NOTE — SUBJECTIVE & OBJECTIVE
Interval History: Patient said she is doing ok except for her headaches.    Review of Systems   All other systems reviewed and are negative.    Objective:     Vital Signs (Most Recent):  Temp: 97.1 °F (36.2 °C) (10/19/24 1145)  Pulse: 81 (10/19/24 1145)  Resp: 17 (10/19/24 1145)  BP: 108/60 (10/19/24 1145)  SpO2: 100 % (10/19/24 1145) Vital Signs (24h Range):  Temp:  [97.1 °F (36.2 °C)-98.7 °F (37.1 °C)] 97.1 °F (36.2 °C)  Pulse:  [78-92] 81  Resp:  [16-20] 17  SpO2:  [96 %-100 %] 100 %  BP: (101-122)/(40-80) 108/60     Weight: 81.6 kg (179 lb 12.8 oz)  Body mass index is 32.89 kg/m².  No intake or output data in the 24 hours ending 10/19/24 1344      Physical Exam  Cardiovascular:      Rate and Rhythm: Normal rate.      Pulses: Normal pulses.   Pulmonary:      Effort: Pulmonary effort is normal.   Abdominal:      General: Abdomen is flat. There is no distension.   Neurological:      Mental Status: She is alert and oriented to person, place, and time.             Significant Labs: All pertinent labs within the past 24 hours have been reviewed.    Significant Imaging: I have reviewed all pertinent imaging results/findings within the past 24 hours.

## 2024-10-19 NOTE — PT/OT/SLP PROGRESS
Physical Therapy      Patient Name:  Sunita Mendez   MRN:  [id number]    Patient not seen today secondary to Patient ill (Comment) (c/o severe Headache.). Will follow-up 10/20/24.

## 2024-10-20 VITALS
WEIGHT: 179.81 LBS | TEMPERATURE: 98 F | BODY MASS INDEX: 33.09 KG/M2 | DIASTOLIC BLOOD PRESSURE: 52 MMHG | OXYGEN SATURATION: 98 % | SYSTOLIC BLOOD PRESSURE: 110 MMHG | HEART RATE: 94 BPM | HEIGHT: 62 IN | RESPIRATION RATE: 18 BRPM

## 2024-10-20 LAB
ALBUMIN SERPL BCP-MCNC: 4 G/DL (ref 3.5–5.2)
ALP SERPL-CCNC: 101 U/L (ref 55–135)
ALT SERPL W/O P-5'-P-CCNC: 27 U/L (ref 10–44)
ANION GAP SERPL CALC-SCNC: 2 MMOL/L (ref 8–16)
AST SERPL-CCNC: 42 U/L (ref 10–40)
BILIRUB SERPL-MCNC: 1.6 MG/DL (ref 0.1–1)
BUN SERPL-MCNC: 9 MG/DL (ref 6–20)
CALCIUM SERPL-MCNC: 9.1 MG/DL (ref 8.7–10.5)
CHLORIDE SERPL-SCNC: 99 MMOL/L (ref 95–110)
CO2 SERPL-SCNC: 32 MMOL/L (ref 23–29)
CREAT SERPL-MCNC: 0.6 MG/DL (ref 0.5–1.4)
ERYTHROCYTE [DISTWIDTH] IN BLOOD BY AUTOMATED COUNT: 19.4 % (ref 11.5–14.5)
EST. GFR  (NO RACE VARIABLE): >60 ML/MIN/1.73 M^2
GLUCOSE SERPL-MCNC: 208 MG/DL (ref 70–110)
HCT VFR BLD AUTO: 27.2 % (ref 37–48.5)
HGB BLD-MCNC: 8.9 G/DL (ref 12–16)
MCH RBC QN AUTO: 32.4 PG (ref 27–31)
MCHC RBC AUTO-ENTMCNC: 32.7 G/DL (ref 32–36)
MCV RBC AUTO: 99 FL (ref 82–98)
PLATELET # BLD AUTO: 390 K/UL (ref 150–450)
PMV BLD AUTO: 10.1 FL (ref 9.2–12.9)
POTASSIUM SERPL-SCNC: 4.3 MMOL/L (ref 3.5–5.1)
PROT SERPL-MCNC: 7.9 G/DL (ref 6–8.4)
RBC # BLD AUTO: 2.75 M/UL (ref 4–5.4)
SODIUM SERPL-SCNC: 133 MMOL/L (ref 136–145)
WBC # BLD AUTO: 9.28 K/UL (ref 3.9–12.7)

## 2024-10-20 PROCEDURE — 25000003 PHARM REV CODE 250: Performed by: STUDENT IN AN ORGANIZED HEALTH CARE EDUCATION/TRAINING PROGRAM

## 2024-10-20 PROCEDURE — 85027 COMPLETE CBC AUTOMATED: CPT | Performed by: STUDENT IN AN ORGANIZED HEALTH CARE EDUCATION/TRAINING PROGRAM

## 2024-10-20 PROCEDURE — 36415 COLL VENOUS BLD VENIPUNCTURE: CPT | Performed by: FAMILY MEDICINE

## 2024-10-20 PROCEDURE — 94799 UNLISTED PULMONARY SVC/PX: CPT

## 2024-10-20 PROCEDURE — 99900031 HC PATIENT EDUCATION (STAT)

## 2024-10-20 PROCEDURE — 80053 COMPREHEN METABOLIC PANEL: CPT | Performed by: FAMILY MEDICINE

## 2024-10-20 PROCEDURE — 25000003 PHARM REV CODE 250: Performed by: FAMILY MEDICINE

## 2024-10-20 PROCEDURE — 97165 OT EVAL LOW COMPLEX 30 MIN: CPT

## 2024-10-20 PROCEDURE — 25000003 PHARM REV CODE 250: Performed by: INTERNAL MEDICINE

## 2024-10-20 PROCEDURE — 94761 N-INVAS EAR/PLS OXIMETRY MLT: CPT

## 2024-10-20 RX ADMIN — ALPRAZOLAM 0.5 MG: 0.5 TABLET ORAL at 09:10

## 2024-10-20 RX ADMIN — HYDROMORPHONE HYDROCHLORIDE 2 MG: 2 TABLET ORAL at 05:10

## 2024-10-20 RX ADMIN — FOLIC ACID 1 MG: 1 TABLET ORAL at 08:10

## 2024-10-20 RX ADMIN — HYDROMORPHONE HYDROCHLORIDE 2 MG: 2 TABLET ORAL at 02:10

## 2024-10-20 RX ADMIN — PANTOPRAZOLE SODIUM 40 MG: 40 TABLET, DELAYED RELEASE ORAL at 05:10

## 2024-10-20 RX ADMIN — HYDROXYUREA 1000 MG: 500 CAPSULE ORAL at 08:10

## 2024-10-20 RX ADMIN — SENNOSIDES AND DOCUSATE SODIUM 1 TABLET: 8.6; 5 TABLET ORAL at 08:10

## 2024-10-20 RX ADMIN — MORPHINE SULFATE 30 MG: 15 TABLET, FILM COATED, EXTENDED RELEASE ORAL at 08:10

## 2024-10-20 RX ADMIN — THERA TABS 1 TABLET: TAB at 08:10

## 2024-10-20 NOTE — PROGRESS NOTES
[facility] Medicine  Progress Note    Patient Name: Sunita Mendez  MRN: [id number]  Patient Class: IP- Inpatient   Admission Date: 10/11/2024  Length of Stay: 7 days  Attending Physician: Lavell Coburn DO  Primary Care Provider: Sebastián Chambers MD        Subjective:     Principal Problem:Sickle cell crisis        HPI:  48 year old female with PMH of sickle cell anemia, bilateral PE in 2010 status post 3 months of Lovenox presents to ED due to generalized pain which has been persistent for 2 weeks.  Patient has been taking her home dose of MS Contin and oxycodone without significant improvement.  Also reports intermittent chest pain 2 days ago which has resolved but had persistent shortness of breath and dry cough.   Denies lower extremity edema, fevers, chills, dysuria.  Reports some nausea but no vomiting.  Patient also reports having a headache consistent with her migraines which is usually exacerbated by sickle cell pain crisis.  Patient also reports having vague abdominal pain for over 1 year.  Describes pain is different from her usual sickle cell pain, dull in nature, is generalized and shifting - currently in the LUQ.  Patient was supposed to have a CT abdomen completed by her primary care doctor however her insurance rejected the test.  Labs revealed a D-dimer of 21.07. CTA chest was negative for PE or consolidation, revealed auto infarcted spleen.  Hemoglobin is 8.1 (patient reports hemoglobin is usually in the 7's).  Patient was placed on nasal cannula, received IV fluids and hydromorphone injections with improvement of pain.    Overview/Hospital Course:  48 year old female with history of sickle cell admitted for pain crisis and migraine. Given IV and PO narcotic pain meds as well as IVF. CTA of chest without PE, D-dimer 21.  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  General surgery consulted, no intervention warranted recommends follow up for vaccines with  Infectious Disease or PCP.  Ultrasound of bilateral lower extremities without DVT.  Hgb decreased to 6.5 so given 1u pRBC 10/15.  Given migraine cocktail with Compazine, Benadryl, Toradol. Due to low BP, verapamil was discontinued.  Recommended to repeat lab work at discharge.  Encouraged to follow-up with PCP    Interval History: Patient said she is doing ok except for her headaches.    Review of Systems   All other systems reviewed and are negative.    Objective:     Vital Signs (Most Recent):  Temp: 97.1 °F (36.2 °C) (10/19/24 1145)  Pulse: 81 (10/19/24 1145)  Resp: 17 (10/19/24 1145)  BP: 108/60 (10/19/24 1145)  SpO2: 100 % (10/19/24 1145) Vital Signs (24h Range):  Temp:  [97.1 °F (36.2 °C)-98.7 °F (37.1 °C)] 97.1 °F (36.2 °C)  Pulse:  [78-92] 81  Resp:  [16-20] 17  SpO2:  [96 %-100 %] 100 %  BP: (101-122)/(40-80) 108/60     Weight: 81.6 kg (179 lb 12.8 oz)  Body mass index is 32.89 kg/m².  No intake or output data in the 24 hours ending 10/19/24 1344      Physical Exam  Cardiovascular:      Rate and Rhythm: Normal rate.      Pulses: Normal pulses.   Pulmonary:      Effort: Pulmonary effort is normal.   Abdominal:      General: Abdomen is flat. There is no distension.   Neurological:      Mental Status: She is alert and oriented to person, place, and time.             Significant Labs: All pertinent labs within the past 24 hours have been reviewed.    Significant Imaging: I have reviewed all pertinent imaging results/findings within the past 24 hours.    Assessment/Plan:      * Sickle cell crisis  Continuing to improve.  Now trying to focus on p.o. 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  Patient is not currently on anticoagulation.  D dimer is 21 - suspect chronically elevated secondary to sickle cell.  BLE doppler us negative   -Monitor respiratory status     Hypertension  Due to low BP, verapamil decreased to 80 BID from TID.    Fever  Resolved    Migraine  Much improved status post Migraine cocktail with Benadryl, Compazine, Toradol  -Continue verapamil and amitriptyline  -Pain meds per sickle cell crisis     Sickle cell anemia  Status post 1 unit packed red cell  -CBC daily and transfuse if needed      VTE Risk Mitigation (From admission, onward)           Ordered     enoxaparin injection 40 mg  Daily         10/12/24 0341     IP VTE HIGH RISK PATIENT  Once         10/12/24 0341     Place sequential compression device  Until discontinued         10/12/24 0341                    Discharge Planning   CORRINE: 10/19/2024     Code Status: Full Code   Is the patient medically ready for discharge?:     Reason for patient still in hospital (select all that apply): Patient trending condition  Discharge Plan A: Home   Discharge Delays: None known at this time              Lavell Coburn DO  Department of Hospital Medicine   [facility]

## 2024-10-20 NOTE — PLAN OF CARE
Patient cleared for discharge from case management standpoint.      Chart and discharge orders reviewed.  Patient discharged home with no further case management needs.        10/20/24 8000   Final Note   Assessment Type Final Discharge Note   Anticipated Discharge Disposition Home   Post-Acute Status   Discharge Delays None known at this time

## 2024-10-20 NOTE — PT/OT/SLP EVAL
Occupational Therapy   Evaluation and Discharge Note    Name: Sunita Mendez  MRN: [id number]  Admitting Diagnosis: Sickle cell crisis  Recent Surgery: * No surgery found *      Recommendations:     Discharge Recommendations: No Therapy Indicated  Discharge Equipment Recommendations: none  Barriers to discharge:  None    Assessment:     Sunita Mendez is a 48 y.o. female with a medical diagnosis of Sickle cell crisis. At this time, patient is functioning at their prior level of function and does not require further acute OT services.     Plan:     During this hospitalization, patient does not require further acute OT services.  Please re-consult if situation changes.    Plan of Care Reviewed with: patient    Subjective     Chief Complaint: none stated; ready to go home  Patient/Family Comments/goals: none stated    Occupational Profile:  Living Environment: Pt lives with her three children (aged 27, 17, and 8 y/o.).   Previous level of function: Independent (with increased time) with ADLs, IADLs, and mobility  Roles and Routines: mother; primary homemaker; drives  Equipment Used at home: none  Assistance upon Discharge: from family    Pain/Comfort:  Pain Rating 1: 0/10    Patients cultural, spiritual, Confucianism conflicts given the current situation:      Objective:     Communicated with: nursing prior to session.  Patient found HOB elevated with telemetry, peripheral IV upon OT entry to room.    General Precautions: Standard, fall  Orthopedic Precautions: N/A  Braces: N/A  Respiratory Status: Room air     Occupational Performance:    Bed Mobility:    Patient completed Supine to Sit with independence  Patient completed Sit to Supine with independence    Functional Mobility/Transfers:  Patient completed Sit <> Stand Transfer with independence  with  no assistive device   Patient completed Toilet Transfer Step Transfer technique with independence with  no AD  Functional Mobility: pt amb in room with independence with no AD, no  LOB, no SOB    Activities of Daily Living:  Feeding:  independence per pt  Grooming: independence to wash hands standing at sink  Lower Body Dressing: independence to don/doff slippers  Toileting: independence with hygiene and clothing management    Cognitive/Visual Perceptual:  Cognitive/Psychosocial Skills:     -       Oriented to: Person, Place, Time, and Situation   -       Follows Commands/attention:Follows two-step commands  -       Communication: clear/fluent  -       Memory: No Deficits noted  -       Safety awareness/insight to disability: intact   -       Mood/Affect/Coping skills/emotional control: Appropriate to situation, Cooperative, and Pleasant    Physical Exam:  Balance:    -       Good seated/standing balance  Upper Extremity Range of Motion:     -       Right Upper Extremity: WFL  -       Left Upper Extremity: WFL  Upper Extremity Strength:    -       Right Upper Extremity: WFL  -       Left Upper Extremity: WFL   Strength:    -       Right Upper Extremity: WFL  -       Left Upper Extremity: WFL  Fine Motor Coordination:    -       Intact  Gross motor coordination:   WFL    AMPAC 6 Click ADL:  AMPAC Total Score: 24    Treatment & Education:  Pt educated on role of OT/POC, importance of OOB/EOB activity, use of call bell, and safety during ADLs, transfers, and functional mobility.    Patient left HOB elevated with all lines intact and call button in reach    GOALS:   Multidisciplinary Problems       Occupational Therapy Goals       Not on file                    History:     Past Medical History:   Diagnosis Date    Anticoagulant long-term use        History reviewed. No pertinent surgical history.    Time Tracking:     OT Date of Treatment: 10/20/24  OT Start Time: 0855  OT Stop Time: 0905  OT Total Time (min): 10 min    Billable Minutes:Evaluation 10    10/20/2024

## 2024-10-20 NOTE — CARE UPDATE
10/19/24 1945   Patient Assessment/Suction   Level of Consciousness (AVPU) alert   Respiratory Effort Normal   Expansion/Accessory Muscles/Retractions no use of accessory muscles   All Lung Fields Breath Sounds clear   Rhythm/Pattern, Respiratory unlabored   Cough Frequency no cough   PRE-TX-O2   Device (Oxygen Therapy) room air   SpO2 96 %   Pulse Oximetry Type Intermittent   $ Pulse Oximetry - Multiple Charge Pulse Oximetry - Multiple   Pulse 98   Resp 19   Incentive Spirometer   $ Incentive Spirometer Charges done with encouragement   Incentive Spirometer Predicted Level (mL) 1500   Administration (IS) instruction provided, follow-up   Number of Repetitions (IS) 10   Level Incentive Spirometer (mL) 1500   Patient Tolerance (IS) good   Education   $ Education Incentive Spirometry;15 min   Respiratory Evaluation   $ Care Plan Tech Time 15 min

## 2024-10-20 NOTE — DISCHARGE SUMMARY
[facility] Medicine  Discharge Summary      Patient Name: Sunita Mendez  MRN: [id number]  AMIRA: [id number]  Patient Class: IP- Inpatient  Admission Date: 10/11/2024  Hospital Length of Stay: 7 days  Discharge Date and Time:  10/20/2024 2:40 PM  Attending Physician: Lavell Coburn DO   Discharging Provider: Lavell Coburn DO  Primary Care Provider: Sebastián Chambers MD    Primary Care Team: Networked reference to record PCT     HPI:   48 year old female with PMH of sickle cell anemia, bilateral PE in 2010 status post 3 months of Lovenox presents to ED due to generalized pain which has been persistent for 2 weeks.  Patient has been taking her home dose of MS Contin and oxycodone without significant improvement.  Also reports intermittent chest pain 2 days ago which has resolved but had persistent shortness of breath and dry cough.   Denies lower extremity edema, fevers, chills, dysuria.  Reports some nausea but no vomiting.  Patient also reports having a headache consistent with her migraines which is usually exacerbated by sickle cell pain crisis.  Patient also reports having vague abdominal pain for over 1 year.  Describes pain is different from her usual sickle cell pain, dull in nature, is generalized and shifting - currently in the LUQ.  Patient was supposed to have a CT abdomen completed by her primary care doctor however her insurance rejected the test.  Labs revealed a D-dimer of 21.07. CTA chest was negative for PE or consolidation, revealed auto infarcted spleen.  Hemoglobin is 8.1 (patient reports hemoglobin is usually in the 7's).  Patient was placed on nasal cannula, received IV fluids and hydromorphone injections with improvement of pain.    * No surgery found *      Hospital Course:   48 year old female with history of sickle cell admitted for pain crisis and migraine. Given IV and PO narcotic pain meds as well as IVF. CTA of chest without PE, D-dimer 21.  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  General surgery consulted, no intervention warranted recommends follow up for vaccines with Infectious Disease or PCP.  Ultrasound of bilateral lower extremities without DVT.  Hgb decreased to 6.5 so given 1u pRBC 10/15.  Given migraine cocktail with Compazine, Benadryl, Toradol. Due to low BP, verapamil was discontinued.  Recommended to repeat lab work at discharge.  Encouraged to follow-up with PCP     Goals of Care Treatment Preferences:  Code Status: Full Code      SDOH Screening:  The patient was screened for food insecurity, housing instability, transportation needs, utility difficulties, and interpersonal safety. The social determinant(s) of health identified as a concern this admission are:  Food insecurity        Social Drivers of Health with Concerns     Food Insecurity: Food Insecurity Present (10/12/2024)        Consults:   Consults (From admission, onward)          Status Ordering Provider     Inpatient consult to General Surgery  Once        Provider:  Remy Francisco MD    Completed MORIAH AZRA            No new Assessment & Plan notes have been filed under this hospital service since the last note was generated.  Service: Hospital Medicine    Final Active Diagnoses:    Diagnosis Date Noted POA    PRINCIPAL PROBLEM:  Sickle cell crisis [D57.00] 08/18/2020 Yes    Splenic infarct [D73.5] 10/12/2024 Yes    History of pulmonary embolus (PE) [Z86.711] 03/11/2017 Yes    Hypertension [I10] 10/19/2024 Yes    Fever [R50.9] 10/18/2024 No    Migraine [G43.909] 10/12/2024 Yes    Sickle cell anemia [D57.1] 12/07/2016 Yes      Problems Resolved During this Admission:    Diagnosis Date Noted Date Resolved POA    Status migrainous [G43.909] 04/01/2023 10/12/2024 Yes       Discharged Condition: fair    Disposition: Home or Self Care    Follow Up:   Follow-up Information       Sebastián Chambers MD Follow up in 1 week(s).    Specialty: Internal Medicine  Contact information:  [address]  [facility] [facility] [address]  [phone]                           Patient Instructions:      CANE FOR HOME USE     Order Specific Question Answer Comments   Type of Cane: Straight    Height: 5' 2" (1.575 m)    Weight: 81.6 kg (179 lb 12.8 oz)    Does patient have medical equipment at home? none    Length of need (1-99 months): 99    Please check all that apply: Patient's condition impairs ambulation.      Comprehensive metabolic panel   Standing Status: Future Standing Exp. Date: 01/18/26     Order Specific Question Answer Comments   Send normal result to authorizing provider's In Basket if patient is active on MyChart: Yes      CBC auto differential   Standing Status: Future Standing Exp. Date: 01/18/26     Order Specific Question Answer Comments   Send normal result to authorizing provider's In Basket if patient is active on MyChart: Yes        Significant Diagnostic Studies: N/A    Pending Diagnostic Studies:       None           Medications:  None    Indwelling Lines/Drains at time of discharge:   Lines/Drains/Airways       None                   Time spent on the discharge of patient: 32 minutes         Lavell Coburn DO  Department of Hospital Medicine  [facility]  "

## 2024-10-20 NOTE — CARE UPDATE
10/20/24 0748   Patient Assessment/Suction   Level of Consciousness (AVPU) alert   Respiratory Effort Normal;Unlabored   Expansion/Accessory Muscles/Retractions no use of accessory muscles   All Lung Fields Breath Sounds clear   Rhythm/Pattern, Respiratory unlabored   Cough Frequency no cough   PRE-TX-O2   Device (Oxygen Therapy) room air   SpO2 96 %   Pulse Oximetry Type Intermittent   $ Pulse Oximetry - Multiple Charge Pulse Oximetry - Multiple   Pulse 99   Resp 18   Incentive Spirometer   $ Incentive Spirometer Charges done with encouragement   Incentive Spirometer Predicted Level (mL) 1500   Administration (IS) instruction provided, follow-up   Number of Repetitions (IS) 10   Level Incentive Spirometer (mL) 1250   Patient Tolerance (IS) good;no adverse signs/symptoms present   Education   $ Education Incentive Spirometry;15 min

## 2024-10-20 NOTE — PT/OT/SLP EVAL
Physical Therapy Evaluation and Discharge Note    Patient Name:  Sunita Mendez   MRN:  [id number]    Recommendations:     Discharge Recommendations: No Therapy Indicated  Discharge Equipment Recommendations: cane, straight  Sunita's mobility limitation cannot be sufficiently resolved by the use of a cane. Her functional mobility deficit can be sufficiently resolved with the use of a Straight . Patient's mobility limitation significantly impairs their ability to participate in one of more activities of daily living.  The use of a Straight cane will significantly improve the patient's ability to participate in MRADLS and the patient will use it on regular basis in the home.   Barriers to discharge: None    Assessment:     Sunita Mendez is a 48 y.o. female admitted with a medical diagnosis of Sickle cell crisis. .  At this time, patient is functioning at their prior level of function and does not require further acute PT services.     Recent Surgery: * No surgery found *      Plan:     During this hospitalization, patient does not require further acute PT services.  Please re-consult if situation changes.      Subjective     Chief Complaint: tendency of knees to give out  Patient/Family Comments/goals: Return home with family  Pain/Comfort:  Pain Rating 1: 0/10    Patients cultural, spiritual, Yarsani conflicts given the current situation:      Living Environment:  Pr lives at home with  her children, the oldest 27 and youngest  9.  Prior to admission, patients level of function was independent.  Equipment used at home: none.  DME owned (not currently used): none.  Upon discharge, patient will have assistance from family.    Objective:     Communicated with nurse prior to session.  Patient found supine with telemetry, peripheral IV upon PT entry to room.    General Precautions: Standard, fall    Orthopedic Precautions:    Braces:    Respiratory Status: Room air    Exams:  Cognitive Exam:  Patient is oriented to Person,  Place, Time, and Situation  RLE ROM: WFL  RLE Strength: WFL  LLE ROM: WFL  LLE Strength: WFL    Functional Mobility:  Bed Mobility:     Supine to Sit: independence  Sit to Supine: independence  Transfers:     Sit to Stand:  modified independence with no AD  Gait: 30 ft no AD.     AM-PAC 6 CLICK MOBILITY  Total Score:24       Treatment and Education:  PT eval and DC with recommendation for SPC    AM-PAC 6 CLICK MOBILITY  Total Score:24     Patient left supine with all lines intact and call button in reach.    GOALS:   Multidisciplinary Problems       Physical Therapy Goals       Not on file                    History:     Past Medical History:   Diagnosis Date    Anticoagulant long-term use        History reviewed. No pertinent surgical history.    Time Tracking:     PT Received On: 10/20/24  PT Start Time: 0913     PT Stop Time: 0920  PT Total Time (min): 7 min     Billable Minutes: Evaluation 7 minutes      10/20/2024

## 2024-10-24 ENCOUNTER — LAB VISIT (OUTPATIENT)
Dept: LAB | Facility: HOSPITAL | Age: 49
End: 2024-10-24
Attending: FAMILY MEDICINE
Payer: MEDICAID

## 2024-10-24 DIAGNOSIS — D57.00 SICKLE CELL PAIN CRISIS: ICD-10-CM

## 2024-10-24 LAB
ALBUMIN SERPL BCP-MCNC: 4.1 G/DL (ref 3.5–5.2)
ALP SERPL-CCNC: 95 U/L (ref 55–135)
ALT SERPL W/O P-5'-P-CCNC: 20 U/L (ref 10–44)
ANION GAP SERPL CALC-SCNC: 6 MMOL/L (ref 8–16)
AST SERPL-CCNC: 37 U/L (ref 10–40)
BASOPHILS # BLD AUTO: 0.05 K/UL (ref 0–0.2)
BASOPHILS NFR BLD: 0.4 % (ref 0–1.9)
BILIRUB SERPL-MCNC: 4 MG/DL (ref 0.1–1)
BUN SERPL-MCNC: 8 MG/DL (ref 6–20)
CALCIUM SERPL-MCNC: 9 MG/DL (ref 8.7–10.5)
CHLORIDE SERPL-SCNC: 101 MMOL/L (ref 95–110)
CO2 SERPL-SCNC: 29 MMOL/L (ref 23–29)
CREAT SERPL-MCNC: 0.6 MG/DL (ref 0.5–1.4)
DACRYOCYTES BLD QL SMEAR: ABNORMAL
DIFFERENTIAL METHOD BLD: ABNORMAL
EOSINOPHIL # BLD AUTO: 0.5 K/UL (ref 0–0.5)
EOSINOPHIL NFR BLD: 3.5 % (ref 0–8)
ERYTHROCYTE [DISTWIDTH] IN BLOOD BY AUTOMATED COUNT: 18.7 % (ref 11.5–14.5)
EST. GFR  (NO RACE VARIABLE): >60 ML/MIN/1.73 M^2
GLUCOSE SERPL-MCNC: 219 MG/DL (ref 70–110)
HCT VFR BLD AUTO: 23.4 % (ref 37–48.5)
HGB BLD-MCNC: 7.7 G/DL (ref 12–16)
HYPOCHROMIA BLD QL SMEAR: ABNORMAL
IMM GRANULOCYTES # BLD AUTO: 0.15 K/UL (ref 0–0.04)
IMM GRANULOCYTES NFR BLD AUTO: 1.1 % (ref 0–0.5)
LYMPHOCYTES # BLD AUTO: 4.5 K/UL (ref 1–4.8)
LYMPHOCYTES NFR BLD: 32.8 % (ref 18–48)
MCH RBC QN AUTO: 33.3 PG (ref 27–31)
MCHC RBC AUTO-ENTMCNC: 32.9 G/DL (ref 32–36)
MCV RBC AUTO: 101 FL (ref 82–98)
MONOCYTES # BLD AUTO: 2.1 K/UL (ref 0.3–1)
MONOCYTES NFR BLD: 15.1 % (ref 4–15)
NEUTROPHILS # BLD AUTO: 6.4 K/UL (ref 1.8–7.7)
NEUTROPHILS NFR BLD: 47.1 % (ref 38–73)
NRBC BLD-RTO: 2 /100 WBC
OVALOCYTES BLD QL SMEAR: ABNORMAL
PLATELET # BLD AUTO: 427 K/UL (ref 150–450)
PLATELET BLD QL SMEAR: ABNORMAL
PMV BLD AUTO: 10.3 FL (ref 9.2–12.9)
POIKILOCYTOSIS BLD QL SMEAR: ABNORMAL
POTASSIUM SERPL-SCNC: 3.7 MMOL/L (ref 3.5–5.1)
PROT SERPL-MCNC: 7.9 G/DL (ref 6–8.4)
RBC # BLD AUTO: 2.31 M/UL (ref 4–5.4)
SICKLE CELLS BLD QL SMEAR: ABNORMAL
SODIUM SERPL-SCNC: 136 MMOL/L (ref 136–145)
TARGETS BLD QL SMEAR: ABNORMAL
WBC # BLD AUTO: 13.6 K/UL (ref 3.9–12.7)

## 2024-10-24 PROCEDURE — 36415 COLL VENOUS BLD VENIPUNCTURE: CPT | Performed by: FAMILY MEDICINE

## 2024-10-24 PROCEDURE — 85025 COMPLETE CBC W/AUTO DIFF WBC: CPT | Performed by: FAMILY MEDICINE

## 2024-10-24 PROCEDURE — 80053 COMPREHEN METABOLIC PANEL: CPT | Performed by: FAMILY MEDICINE

## 2025-01-28 ENCOUNTER — HOSPITAL ENCOUNTER (INPATIENT)
Facility: HOSPITAL | Age: 50
LOS: 1 days | Discharge: HOME | DRG: 812 | End: 2025-01-30
Attending: STUDENT IN AN ORGANIZED HEALTH CARE EDUCATION/TRAINING PROGRAM | Admitting: INTERNAL MEDICINE
Payer: MEDICAID

## 2025-01-28 DIAGNOSIS — R00.0 TACHYCARDIA: ICD-10-CM

## 2025-01-28 DIAGNOSIS — D57.00 SICKLE CELL PAIN CRISIS: ICD-10-CM

## 2025-01-28 DIAGNOSIS — D57.00 SICKLE CELL CRISIS: ICD-10-CM

## 2025-01-28 DIAGNOSIS — R07.9 CHEST PAIN: ICD-10-CM

## 2025-01-28 LAB
ALBUMIN SERPL BCP-MCNC: 4 G/DL (ref 3.5–5.2)
ALP SERPL-CCNC: 98 U/L (ref 55–135)
ALT SERPL W/O P-5'-P-CCNC: 8 U/L (ref 10–44)
ANION GAP SERPL CALC-SCNC: 5 MMOL/L (ref 8–16)
ANISOCYTOSIS BLD QL SMEAR: ABNORMAL
AST SERPL-CCNC: 22 U/L (ref 10–40)
B-HCG UR QL: NEGATIVE
BACTERIA #/AREA URNS HPF: ABNORMAL /HPF
BASOPHILS # BLD AUTO: 0.07 K/UL (ref 0–0.2)
BASOPHILS NFR BLD: 0.5 % (ref 0–1.9)
BILIRUB SERPL-MCNC: 1.7 MG/DL (ref 0.1–1)
BILIRUB UR QL STRIP: NEGATIVE
BUN SERPL-MCNC: 5 MG/DL (ref 6–20)
CALCIUM SERPL-MCNC: 9.2 MG/DL (ref 8.7–10.5)
CHLORIDE SERPL-SCNC: 105 MMOL/L (ref 95–110)
CLARITY UR: CLEAR
CO2 SERPL-SCNC: 28 MMOL/L (ref 23–29)
COLOR UR: YELLOW
CREAT SERPL-MCNC: 0.5 MG/DL (ref 0.5–1.4)
DIFFERENTIAL METHOD BLD: ABNORMAL
EOSINOPHIL # BLD AUTO: 0.2 K/UL (ref 0–0.5)
EOSINOPHIL NFR BLD: 1.2 % (ref 0–8)
ERYTHROCYTE [DISTWIDTH] IN BLOOD BY AUTOMATED COUNT: 20.2 % (ref 11.5–14.5)
EST. GFR  (NO RACE VARIABLE): >60 ML/MIN/1.73 M^2
GLUCOSE SERPL-MCNC: 164 MG/DL (ref 70–110)
GLUCOSE UR QL STRIP: NEGATIVE
HCT VFR BLD AUTO: 24.3 % (ref 37–48.5)
HGB BLD-MCNC: 8.3 G/DL (ref 12–16)
HGB UR QL STRIP: ABNORMAL
HYALINE CASTS #/AREA URNS LPF: 3 /LPF
IMM GRANULOCYTES # BLD AUTO: 0.1 K/UL (ref 0–0.04)
IMM GRANULOCYTES NFR BLD AUTO: 0.6 % (ref 0–0.5)
INFLUENZA A, MOLECULAR: NEGATIVE
INFLUENZA B, MOLECULAR: NEGATIVE
KETONES UR QL STRIP: NEGATIVE
LEUKOCYTE ESTERASE UR QL STRIP: NEGATIVE
LYMPHOCYTES # BLD AUTO: 4.6 K/UL (ref 1–4.8)
LYMPHOCYTES NFR BLD: 29.6 % (ref 18–48)
MCH RBC QN AUTO: 30.3 PG (ref 27–31)
MCHC RBC AUTO-ENTMCNC: 34.2 G/DL (ref 32–36)
MCV RBC AUTO: 89 FL (ref 82–98)
MICROSCOPIC COMMENT: ABNORMAL
MONOCYTES # BLD AUTO: 1.6 K/UL (ref 0.3–1)
MONOCYTES NFR BLD: 10.1 % (ref 4–15)
NEUTROPHILS # BLD AUTO: 9 K/UL (ref 1.8–7.7)
NEUTROPHILS NFR BLD: 58 % (ref 38–73)
NITRITE UR QL STRIP: NEGATIVE
NRBC BLD-RTO: 1 /100 WBC
OVALOCYTES BLD QL SMEAR: ABNORMAL
PH UR STRIP: 6 [PH] (ref 5–8)
PLATELET # BLD AUTO: 507 K/UL (ref 150–450)
PLATELET BLD QL SMEAR: ABNORMAL
PMV BLD AUTO: 9.9 FL (ref 9.2–12.9)
POLYCHROMASIA BLD QL SMEAR: ABNORMAL
POTASSIUM SERPL-SCNC: 3.5 MMOL/L (ref 3.5–5.1)
PROT SERPL-MCNC: 8.1 G/DL (ref 6–8.4)
PROT UR QL STRIP: ABNORMAL
RBC # BLD AUTO: 2.74 M/UL (ref 4–5.4)
RBC #/AREA URNS HPF: 1 /HPF (ref 0–4)
RETICS/RBC NFR AUTO: 10.3 % (ref 0.5–2.5)
SARS-COV-2 RDRP RESP QL NAA+PROBE: NEGATIVE
SICKLE CELLS BLD QL SMEAR: ABNORMAL
SODIUM SERPL-SCNC: 138 MMOL/L (ref 136–145)
SP GR UR STRIP: 1.01 (ref 1–1.03)
SPECIMEN SOURCE: NORMAL
SQUAMOUS #/AREA URNS HPF: 3 /HPF
URN SPEC COLLECT METH UR: ABNORMAL
UROBILINOGEN UR STRIP-ACNC: ABNORMAL EU/DL
WBC # BLD AUTO: 15.55 K/UL (ref 3.9–12.7)
WBC #/AREA URNS HPF: 1 /HPF (ref 0–5)

## 2025-01-28 PROCEDURE — 96361 HYDRATE IV INFUSION ADD-ON: CPT

## 2025-01-28 PROCEDURE — 93005 ELECTROCARDIOGRAM TRACING: CPT | Performed by: INTERNAL MEDICINE

## 2025-01-28 PROCEDURE — 87502 INFLUENZA DNA AMP PROBE: CPT | Performed by: STUDENT IN AN ORGANIZED HEALTH CARE EDUCATION/TRAINING PROGRAM

## 2025-01-28 PROCEDURE — 25000003 PHARM REV CODE 250: Performed by: STUDENT IN AN ORGANIZED HEALTH CARE EDUCATION/TRAINING PROGRAM

## 2025-01-28 PROCEDURE — 99285 EMERGENCY DEPT VISIT HI MDM: CPT | Mod: 25

## 2025-01-28 PROCEDURE — 81001 URINALYSIS AUTO W/SCOPE: CPT | Performed by: NURSE PRACTITIONER

## 2025-01-28 PROCEDURE — 96375 TX/PRO/DX INJ NEW DRUG ADDON: CPT

## 2025-01-28 PROCEDURE — 87635 SARS-COV-2 COVID-19 AMP PRB: CPT | Performed by: STUDENT IN AN ORGANIZED HEALTH CARE EDUCATION/TRAINING PROGRAM

## 2025-01-28 PROCEDURE — 96376 TX/PRO/DX INJ SAME DRUG ADON: CPT

## 2025-01-28 PROCEDURE — 93010 ELECTROCARDIOGRAM REPORT: CPT | Mod: ,,, | Performed by: INTERNAL MEDICINE

## 2025-01-28 PROCEDURE — 63600175 PHARM REV CODE 636 W HCPCS: Mod: TB,JZ | Performed by: STUDENT IN AN ORGANIZED HEALTH CARE EDUCATION/TRAINING PROGRAM

## 2025-01-28 PROCEDURE — 80053 COMPREHEN METABOLIC PANEL: CPT | Performed by: NURSE PRACTITIONER

## 2025-01-28 PROCEDURE — 63600175 PHARM REV CODE 636 W HCPCS: Mod: JZ,TB | Performed by: STUDENT IN AN ORGANIZED HEALTH CARE EDUCATION/TRAINING PROGRAM

## 2025-01-28 PROCEDURE — 96374 THER/PROPH/DIAG INJ IV PUSH: CPT

## 2025-01-28 PROCEDURE — 85025 COMPLETE CBC W/AUTO DIFF WBC: CPT | Performed by: NURSE PRACTITIONER

## 2025-01-28 PROCEDURE — 81025 URINE PREGNANCY TEST: CPT | Performed by: NURSE PRACTITIONER

## 2025-01-28 PROCEDURE — 12000002 HC ACUTE/MED SURGE SEMI-PRIVATE ROOM

## 2025-01-28 PROCEDURE — 85045 AUTOMATED RETICULOCYTE COUNT: CPT | Performed by: NURSE PRACTITIONER

## 2025-01-28 RX ORDER — HYDROMORPHONE HYDROCHLORIDE 1 MG/ML
2 INJECTION, SOLUTION INTRAMUSCULAR; INTRAVENOUS; SUBCUTANEOUS
Status: COMPLETED | OUTPATIENT
Start: 2025-01-28 | End: 2025-01-28

## 2025-01-28 RX ORDER — OXYCODONE AND ACETAMINOPHEN 10; 325 MG/1; MG/1
1 TABLET ORAL ONCE
Status: COMPLETED | OUTPATIENT
Start: 2025-01-28 | End: 2025-01-28

## 2025-01-28 RX ORDER — HYDROMORPHONE HYDROCHLORIDE 1 MG/ML
1 INJECTION, SOLUTION INTRAMUSCULAR; INTRAVENOUS; SUBCUTANEOUS
Status: COMPLETED | OUTPATIENT
Start: 2025-01-28 | End: 2025-01-28

## 2025-01-28 RX ORDER — KETOROLAC TROMETHAMINE 30 MG/ML
15 INJECTION, SOLUTION INTRAMUSCULAR; INTRAVENOUS
Status: COMPLETED | OUTPATIENT
Start: 2025-01-28 | End: 2025-01-28

## 2025-01-28 RX ADMIN — KETOROLAC TROMETHAMINE 15 MG: 30 INJECTION, SOLUTION INTRAMUSCULAR; INTRAVENOUS at 05:01

## 2025-01-28 RX ADMIN — SODIUM CHLORIDE 1000 ML: 9 INJECTION, SOLUTION INTRAVENOUS at 05:01

## 2025-01-28 RX ADMIN — HYDROMORPHONE HYDROCHLORIDE 1 MG: 1 INJECTION, SOLUTION INTRAMUSCULAR; INTRAVENOUS; SUBCUTANEOUS at 08:01

## 2025-01-28 RX ADMIN — OXYCODONE HYDROCHLORIDE AND ACETAMINOPHEN 1 TABLET: 10; 325 TABLET ORAL at 05:01

## 2025-01-28 RX ADMIN — HYDROMORPHONE HYDROCHLORIDE 2 MG: 1 INJECTION, SOLUTION INTRAMUSCULAR; INTRAVENOUS; SUBCUTANEOUS at 10:01

## 2025-01-28 RX ADMIN — HYDROMORPHONE HYDROCHLORIDE 2 MG: 1 INJECTION, SOLUTION INTRAMUSCULAR; INTRAVENOUS; SUBCUTANEOUS at 09:01

## 2025-01-28 NOTE — FIRST PROVIDER EVALUATION
" Emergency Department TeleTriage Encounter Note      CHIEF COMPLAINT    Chief Complaint   Patient presents with    Sickle Cell Pain Crisis     Has been out of medications for 2 wks.  Unable to get regular medications "Can't take the pain anymore"       VITAL SIGNS   Initial Vitals [01/28/25 1520]   BP Pulse Resp Temp SpO2   (!) 141/76 (!) 112 (!) 22 98.4 °F (36.9 °C) 97 %      MAP       --            ALLERGIES    Review of patient's allergies indicates:  No Known Allergies    PROVIDER TRIAGE NOTE  Verbal consent for the teletriage evaluation was given by the patient at the start of the evaluation.  All efforts will be made to maintain patient's privacy during the evaluation.      This is a teletriage evaluation of a 49 y.o. female presenting to the ED with c/o Sickle Cell pain crisis; pain to all joints for 1 month.  Out of RX meds. Limited physical exam via telehealth: The patient is awake, alert, answering questions appropriately and is not in respiratory distress.  As the Teletriage provider, I performed an initial assessment and ordered appropriate labs and imaging studies, if any, to facilitate the patient's care once placed in the ED. Once a room is available, care and a full evaluation will be completed by an alternate ED provider.  Any additional orders and the final disposition will be determined by that provider.  All imaging and labs will not be followed-up by the Teletriage Team, including myself.         ORDERS  Labs Reviewed - No data to display    ED Orders (720h ago, onward)      Start Ordered     Status Ordering Provider    01/28/25 1530 01/28/25 1529  Pulse Oximetry Continuous  Continuous         Ordered KETURAH JAMESON    01/28/25 1530 01/28/25 1529  Cardiac Monitoring - Adult  Continuous        Comments: Notify Physician If:    Ordered KETURAH JAMESON    Unscheduled 01/28/25 1529  Saline lock IV  Once         Ordered KETURAH JAMESON    Unscheduled 01/28/25 1529  EKG 12-lead  Once         Ordered " KETURAH JAMESON    Unscheduled 01/28/25 1529  CBC auto differential  STAT         Ordered KETURAH JAMESON    Unscheduled 01/28/25 1529  Comprehensive metabolic panel  STAT         Ordered KETURAH JAMESON    Unscheduled 01/28/25 1529  Urinalysis, Reflex to Urine Culture Urine, Clean Catch  STAT         Ordered KETURAH JAMESON    Unscheduled 01/28/25 1529  Reticulocytes  Once         Ordered KETURAH JAMESON              Virtual Visit Note: The provider triage portion of this emergency department evaluation and documentation was performed via [employer], a HIPAA-compliant telemedicine application, in concert with a tele-presenter in the room. A face to face patient evaluation with one of my colleagues will occur once the patient is placed in an emergency department room.      DISCLAIMER: This note was prepared with [employer] voice recognition transcription software. Garbled syntax, mangled pronouns, and other bizarre constructions may be attributed to that software system.

## 2025-01-29 LAB
ALBUMIN SERPL BCP-MCNC: 3.5 G/DL (ref 3.5–5.2)
ALP SERPL-CCNC: 84 U/L (ref 55–135)
ALT SERPL W/O P-5'-P-CCNC: 6 U/L (ref 10–44)
ANION GAP SERPL CALC-SCNC: 5 MMOL/L (ref 8–16)
AST SERPL-CCNC: 19 U/L (ref 10–40)
BASOPHILS # BLD AUTO: 0.05 K/UL (ref 0–0.2)
BASOPHILS NFR BLD: 0.4 % (ref 0–1.9)
BILIRUB SERPL-MCNC: 1.3 MG/DL (ref 0.1–1)
BUN SERPL-MCNC: 4 MG/DL (ref 6–20)
CALCIUM SERPL-MCNC: 8.2 MG/DL (ref 8.7–10.5)
CHLORIDE SERPL-SCNC: 106 MMOL/L (ref 95–110)
CO2 SERPL-SCNC: 28 MMOL/L (ref 23–29)
CREAT SERPL-MCNC: 0.5 MG/DL (ref 0.5–1.4)
DIFFERENTIAL METHOD BLD: ABNORMAL
EOSINOPHIL # BLD AUTO: 0.5 K/UL (ref 0–0.5)
EOSINOPHIL NFR BLD: 3.9 % (ref 0–8)
ERYTHROCYTE [DISTWIDTH] IN BLOOD BY AUTOMATED COUNT: 19.9 % (ref 11.5–14.5)
EST. GFR  (NO RACE VARIABLE): >60 ML/MIN/1.73 M^2
GLUCOSE SERPL-MCNC: 130 MG/DL (ref 70–110)
HCT VFR BLD AUTO: 21.5 % (ref 37–48.5)
HGB BLD-MCNC: 7.2 G/DL (ref 12–16)
IMM GRANULOCYTES # BLD AUTO: 0.07 K/UL (ref 0–0.04)
IMM GRANULOCYTES NFR BLD AUTO: 0.6 % (ref 0–0.5)
LYMPHOCYTES # BLD AUTO: 4.7 K/UL (ref 1–4.8)
LYMPHOCYTES NFR BLD: 38 % (ref 18–48)
MAGNESIUM SERPL-MCNC: 1.7 MG/DL (ref 1.6–2.6)
MCH RBC QN AUTO: 30 PG (ref 27–31)
MCHC RBC AUTO-ENTMCNC: 33.5 G/DL (ref 32–36)
MCV RBC AUTO: 90 FL (ref 82–98)
MONOCYTES # BLD AUTO: 1.5 K/UL (ref 0.3–1)
MONOCYTES NFR BLD: 12.4 % (ref 4–15)
NEUTROPHILS # BLD AUTO: 5.6 K/UL (ref 1.8–7.7)
NEUTROPHILS NFR BLD: 44.7 % (ref 38–73)
NRBC BLD-RTO: 1 /100 WBC
PLATELET # BLD AUTO: 450 K/UL (ref 150–450)
PMV BLD AUTO: 9.7 FL (ref 9.2–12.9)
POTASSIUM SERPL-SCNC: 3.6 MMOL/L (ref 3.5–5.1)
PROT SERPL-MCNC: 6.9 G/DL (ref 6–8.4)
RBC # BLD AUTO: 2.4 M/UL (ref 4–5.4)
SODIUM SERPL-SCNC: 139 MMOL/L (ref 136–145)
TROPONIN I SERPL HS-MCNC: 17.6 PG/ML (ref 0–14.9)
WBC # BLD AUTO: 12.46 K/UL (ref 3.9–12.7)

## 2025-01-29 PROCEDURE — 63600175 PHARM REV CODE 636 W HCPCS: Mod: JZ,TB | Performed by: INTERNAL MEDICINE

## 2025-01-29 PROCEDURE — 25000003 PHARM REV CODE 250: Performed by: INTERNAL MEDICINE

## 2025-01-29 PROCEDURE — 12000002 HC ACUTE/MED SURGE SEMI-PRIVATE ROOM

## 2025-01-29 PROCEDURE — 25000003 PHARM REV CODE 250: Performed by: EMERGENCY MEDICINE

## 2025-01-29 PROCEDURE — 83735 ASSAY OF MAGNESIUM: CPT | Performed by: INTERNAL MEDICINE

## 2025-01-29 PROCEDURE — 63600175 PHARM REV CODE 636 W HCPCS: Mod: JZ,TB | Performed by: EMERGENCY MEDICINE

## 2025-01-29 PROCEDURE — 25000003 PHARM REV CODE 250: Performed by: NURSE PRACTITIONER

## 2025-01-29 PROCEDURE — 36415 COLL VENOUS BLD VENIPUNCTURE: CPT | Performed by: INTERNAL MEDICINE

## 2025-01-29 PROCEDURE — 96376 TX/PRO/DX INJ SAME DRUG ADON: CPT

## 2025-01-29 PROCEDURE — 27000221 HC OXYGEN, UP TO 24 HOURS

## 2025-01-29 PROCEDURE — 84484 ASSAY OF TROPONIN QUANT: CPT | Performed by: INTERNAL MEDICINE

## 2025-01-29 PROCEDURE — 80053 COMPREHEN METABOLIC PANEL: CPT | Performed by: INTERNAL MEDICINE

## 2025-01-29 PROCEDURE — 63600175 PHARM REV CODE 636 W HCPCS: Mod: TB,JZ | Performed by: EMERGENCY MEDICINE

## 2025-01-29 PROCEDURE — 63600175 PHARM REV CODE 636 W HCPCS: Mod: TB,JZ | Performed by: INTERNAL MEDICINE

## 2025-01-29 PROCEDURE — 63600175 PHARM REV CODE 636 W HCPCS: Mod: TB,JZ | Performed by: NURSE PRACTITIONER

## 2025-01-29 PROCEDURE — 85025 COMPLETE CBC W/AUTO DIFF WBC: CPT | Performed by: INTERNAL MEDICINE

## 2025-01-29 RX ORDER — IBUPROFEN 200 MG
800 TABLET ORAL
Status: COMPLETED | OUTPATIENT
Start: 2025-01-29 | End: 2025-01-29

## 2025-01-29 RX ORDER — MORPHINE SULFATE 15 MG/1
30 TABLET, FILM COATED, EXTENDED RELEASE ORAL EVERY 12 HOURS
Status: DISCONTINUED | OUTPATIENT
Start: 2025-01-29 | End: 2025-01-30 | Stop reason: HOSPADM

## 2025-01-29 RX ORDER — ALPRAZOLAM 0.5 MG/1
1 TABLET ORAL NIGHTLY PRN
Status: DISCONTINUED | OUTPATIENT
Start: 2025-01-29 | End: 2025-01-30 | Stop reason: HOSPADM

## 2025-01-29 RX ORDER — FOLIC ACID 1 MG/1
1 TABLET ORAL DAILY
Status: DISCONTINUED | OUTPATIENT
Start: 2025-01-29 | End: 2025-01-30 | Stop reason: HOSPADM

## 2025-01-29 RX ORDER — ALPRAZOLAM 0.25 MG/1
1 TABLET ORAL
Status: COMPLETED | OUTPATIENT
Start: 2025-01-29 | End: 2025-01-29

## 2025-01-29 RX ORDER — HYDROMORPHONE HYDROCHLORIDE 1 MG/ML
1 INJECTION, SOLUTION INTRAMUSCULAR; INTRAVENOUS; SUBCUTANEOUS
Status: DISCONTINUED | OUTPATIENT
Start: 2025-01-29 | End: 2025-01-29

## 2025-01-29 RX ORDER — ACETAMINOPHEN 325 MG/1
650 TABLET ORAL EVERY 4 HOURS PRN
Status: DISCONTINUED | OUTPATIENT
Start: 2025-01-29 | End: 2025-01-30 | Stop reason: HOSPADM

## 2025-01-29 RX ORDER — ACETAMINOPHEN 325 MG/1
650 TABLET ORAL EVERY 8 HOURS PRN
Status: DISCONTINUED | OUTPATIENT
Start: 2025-01-29 | End: 2025-01-30 | Stop reason: HOSPADM

## 2025-01-29 RX ORDER — SODIUM,POTASSIUM PHOSPHATES 280-250MG
2 POWDER IN PACKET (EA) ORAL
Status: DISCONTINUED | OUTPATIENT
Start: 2025-01-29 | End: 2025-01-30 | Stop reason: HOSPADM

## 2025-01-29 RX ORDER — GLUCAGON 1 MG
1 KIT INJECTION
Status: DISCONTINUED | OUTPATIENT
Start: 2025-01-29 | End: 2025-01-30 | Stop reason: HOSPADM

## 2025-01-29 RX ORDER — IBUPROFEN 200 MG
24 TABLET ORAL
Status: DISCONTINUED | OUTPATIENT
Start: 2025-01-29 | End: 2025-01-30 | Stop reason: HOSPADM

## 2025-01-29 RX ORDER — SODIUM CHLORIDE 9 MG/ML
INJECTION, SOLUTION INTRAVENOUS CONTINUOUS
Status: DISCONTINUED | OUTPATIENT
Start: 2025-01-29 | End: 2025-01-30 | Stop reason: HOSPADM

## 2025-01-29 RX ORDER — HYDROMORPHONE HYDROCHLORIDE 1 MG/ML
2 INJECTION, SOLUTION INTRAMUSCULAR; INTRAVENOUS; SUBCUTANEOUS
Status: COMPLETED | OUTPATIENT
Start: 2025-01-29 | End: 2025-01-29

## 2025-01-29 RX ORDER — OXYCODONE HYDROCHLORIDE 5 MG/1
5 TABLET ORAL EVERY 4 HOURS PRN
Status: DISCONTINUED | OUTPATIENT
Start: 2025-01-29 | End: 2025-01-29

## 2025-01-29 RX ORDER — HYDROMORPHONE HYDROCHLORIDE 1 MG/ML
2 INJECTION, SOLUTION INTRAMUSCULAR; INTRAVENOUS; SUBCUTANEOUS ONCE
Status: COMPLETED | OUTPATIENT
Start: 2025-01-29 | End: 2025-01-29

## 2025-01-29 RX ORDER — GABAPENTIN 100 MG/1
100 CAPSULE ORAL 3 TIMES DAILY
Status: DISCONTINUED | OUTPATIENT
Start: 2025-01-29 | End: 2025-01-30 | Stop reason: HOSPADM

## 2025-01-29 RX ORDER — IBUPROFEN 200 MG
16 TABLET ORAL
Status: DISCONTINUED | OUTPATIENT
Start: 2025-01-29 | End: 2025-01-30 | Stop reason: HOSPADM

## 2025-01-29 RX ORDER — TALC
6 POWDER (GRAM) TOPICAL NIGHTLY PRN
Status: DISCONTINUED | OUTPATIENT
Start: 2025-01-29 | End: 2025-01-30 | Stop reason: HOSPADM

## 2025-01-29 RX ORDER — ONDANSETRON HYDROCHLORIDE 2 MG/ML
4 INJECTION, SOLUTION INTRAVENOUS EVERY 6 HOURS PRN
Status: DISCONTINUED | OUTPATIENT
Start: 2025-01-29 | End: 2025-01-30 | Stop reason: HOSPADM

## 2025-01-29 RX ORDER — HYDROCODONE BITARTRATE AND ACETAMINOPHEN 5; 325 MG/1; MG/1
1 TABLET ORAL EVERY 6 HOURS PRN
Status: DISCONTINUED | OUTPATIENT
Start: 2025-01-29 | End: 2025-01-29

## 2025-01-29 RX ORDER — LANOLIN ALCOHOL/MO/W.PET/CERES
800 CREAM (GRAM) TOPICAL
Status: DISCONTINUED | OUTPATIENT
Start: 2025-01-29 | End: 2025-01-30 | Stop reason: HOSPADM

## 2025-01-29 RX ORDER — HYDROMORPHONE HYDROCHLORIDE 1 MG/ML
2 INJECTION, SOLUTION INTRAMUSCULAR; INTRAVENOUS; SUBCUTANEOUS
Status: DISCONTINUED | OUTPATIENT
Start: 2025-01-29 | End: 2025-01-30 | Stop reason: HOSPADM

## 2025-01-29 RX ORDER — MORPHINE SULFATE 15 MG/1
15 TABLET, FILM COATED, EXTENDED RELEASE ORAL EVERY 12 HOURS
Status: DISCONTINUED | OUTPATIENT
Start: 2025-01-29 | End: 2025-01-29

## 2025-01-29 RX ORDER — AMITRIPTYLINE HYDROCHLORIDE 25 MG/1
50 TABLET, FILM COATED ORAL NIGHTLY
Status: DISCONTINUED | OUTPATIENT
Start: 2025-01-29 | End: 2025-01-30 | Stop reason: HOSPADM

## 2025-01-29 RX ORDER — ALUMINUM HYDROXIDE, MAGNESIUM HYDROXIDE, AND SIMETHICONE 1200; 120; 1200 MG/30ML; MG/30ML; MG/30ML
30 SUSPENSION ORAL 4 TIMES DAILY PRN
Status: DISCONTINUED | OUTPATIENT
Start: 2025-01-29 | End: 2025-01-30 | Stop reason: HOSPADM

## 2025-01-29 RX ORDER — AMOXICILLIN 250 MG
1 CAPSULE ORAL DAILY PRN
Status: DISCONTINUED | OUTPATIENT
Start: 2025-01-29 | End: 2025-01-30 | Stop reason: HOSPADM

## 2025-01-29 RX ORDER — NALOXONE HCL 0.4 MG/ML
0.02 VIAL (ML) INJECTION
Status: DISCONTINUED | OUTPATIENT
Start: 2025-01-29 | End: 2025-01-30 | Stop reason: HOSPADM

## 2025-01-29 RX ADMIN — SODIUM CHLORIDE: 9 INJECTION, SOLUTION INTRAVENOUS at 09:01

## 2025-01-29 RX ADMIN — HYDROMORPHONE HYDROCHLORIDE 2 MG: 1 INJECTION, SOLUTION INTRAMUSCULAR; INTRAVENOUS; SUBCUTANEOUS at 10:01

## 2025-01-29 RX ADMIN — GABAPENTIN 100 MG: 100 CAPSULE ORAL at 02:01

## 2025-01-29 RX ADMIN — Medication 800 MG: at 09:01

## 2025-01-29 RX ADMIN — AMITRIPTYLINE HYDROCHLORIDE 50 MG: 25 TABLET, FILM COATED ORAL at 09:01

## 2025-01-29 RX ADMIN — ALPRAZOLAM 1 MG: 0.25 TABLET ORAL at 01:01

## 2025-01-29 RX ADMIN — HYDROMORPHONE HYDROCHLORIDE 2 MG: 1 INJECTION, SOLUTION INTRAMUSCULAR; INTRAVENOUS; SUBCUTANEOUS at 05:01

## 2025-01-29 RX ADMIN — GABAPENTIN 100 MG: 100 CAPSULE ORAL at 09:01

## 2025-01-29 RX ADMIN — SODIUM CHLORIDE: 9 INJECTION, SOLUTION INTRAVENOUS at 04:01

## 2025-01-29 RX ADMIN — FOLIC ACID 1 MG: 1 TABLET ORAL at 09:01

## 2025-01-29 RX ADMIN — ALPRAZOLAM 1 MG: 0.5 TABLET ORAL at 05:01

## 2025-01-29 RX ADMIN — OXYCODONE HYDROCHLORIDE 15 MG: 10 TABLET ORAL at 10:01

## 2025-01-29 RX ADMIN — SODIUM CHLORIDE: 9 INJECTION, SOLUTION INTRAVENOUS at 10:01

## 2025-01-29 RX ADMIN — HYDROMORPHONE HYDROCHLORIDE 1 MG: 1 INJECTION, SOLUTION INTRAMUSCULAR; INTRAVENOUS; SUBCUTANEOUS at 07:01

## 2025-01-29 RX ADMIN — HYDROMORPHONE HYDROCHLORIDE 2 MG: 1 INJECTION, SOLUTION INTRAMUSCULAR; INTRAVENOUS; SUBCUTANEOUS at 08:01

## 2025-01-29 RX ADMIN — HYDROMORPHONE HYDROCHLORIDE 2 MG: 1 INJECTION, SOLUTION INTRAMUSCULAR; INTRAVENOUS; SUBCUTANEOUS at 01:01

## 2025-01-29 RX ADMIN — HYDROMORPHONE HYDROCHLORIDE 2 MG: 1 INJECTION, SOLUTION INTRAMUSCULAR; INTRAVENOUS; SUBCUTANEOUS at 02:01

## 2025-01-29 RX ADMIN — MORPHINE SULFATE 30 MG: 30 TABLET, FILM COATED, EXTENDED RELEASE ORAL at 09:01

## 2025-01-29 RX ADMIN — IBUPROFEN 800 MG: 200 TABLET, FILM COATED ORAL at 01:01

## 2025-01-29 RX ADMIN — MORPHINE SULFATE 15 MG: 15 TABLET, EXTENDED RELEASE ORAL at 09:01

## 2025-01-29 RX ADMIN — POTASSIUM BICARBONATE 50 MEQ: 978 TABLET, EFFERVESCENT ORAL at 09:01

## 2025-01-29 NOTE — HOSPITAL COURSE
Ms. Mendez has been monitored closely during her hospitalization. She was admitted on 1/29/25 for sickle cell pain crisis. She ran out of her home pain medications 3 weeks ago and has been taking ibuprofen for pain as she missed her last appt d/t migraine and then the snow. She has an appt with a new provider on Friday 1/31 and is eager to make this appt. Pain is mostly in her joints. CXR with no acute consolidation. EKG with no acute changes. She was initially resumed on a lower dose of her home pain medication as she has been off of it for some time, but her pain was poorly controlled and pain medication has been adjusted per her home regimen.     Patient said she has appointment with her Hematology Oncology to receive her pain medication outpatient tomorrow.  Prescribed 1 day of her home pain medication so that patient can make it to appointment tomorrow.  Patient's vitals and labs were reviewed and patient was discharged once medically stable.  Physical exam performed before discharge.

## 2025-01-29 NOTE — CARE UPDATE
She was admitted on 1/29/25 for sickle cell pain crisis. She ran out of her home pain medications 3 weeks ago and has been taking ibuprofen for pain as she missed her last appt d/t migraine and then the snow. She has an appt with a new provider on Friday 1/31 and is eager to make this appt. Pain is mostly in her joints. CXR with no acute consolidation. EKG with no acute changes. She was initially resumed on a lower dose of her home pain medication as she has been off of it for some time, but her pain was poorly controlled and pain medication has been adjusted per her home regimen.

## 2025-01-29 NOTE — H&P
"  [facility] - Emergency Dept  Hospital Medicine  History & Physical    Patient Name: Sunita Mendez  MRN: [id number]  Patient Class: OP- Observation  Admission Date: 1/28/2025  Attending Physician: Suzie Mendez MD  Primary Care Provider: Sebastián Chambers MD    Patient seen at 3:19 a.m. on 01/29/2025.  History is obtained from the patient/ER physician/records  Subjective:     Principal Problem:  Sickle cell crisis    Chief Complaint:   Chief Complaint   Patient presents with    Sickle Cell Pain Crisis     Has been out of medications for 2 wks.  Unable to get regular medications "Can't take the pain anymore"      HPI:   -49-year-old  female with a history of sickle cell disease and avascular necrosis; she also endorses a history of anxiety/depression  -Her hematologist recently relocated and she is in the process of obtaining a new one (sees that physician on this upcoming Friday)  -The patient has been out of her medications for about 4 weeks and during this time has had pain everywhere (which has gotten progressively worse).  -Ordinarily, she takes Morphine 30 mg p.o. q.12 hours and OxyContin 15 mg (although she reports she takes 3 or 4 Oxycontin at a time)  -As she has not had access to medications, she has been using ibuprofen over-the-counter  -In the ER she was afebrile and hemodynamically stable and initially tachycardic in the 110s (now 70s)  -Pertinent workup shows unremarkable CMP and no evidence of a UTI  -WBC was 15.55 with a hemoglobin of 8.3 (7.7 in October) and 10.3% reticulocyte count  -Influenza/COVID testing were negative  -Chest x-ray was negative except she did have osteonecrosis of the right humeral head  -EKG showed, per my interpretation, sinus rhythm at 100 beats per minute with T-wave inversion in leads 2, 3, AVF, and V4 through V6 (similar to previous tracing)  -The patient required multiple doses of Dilaudid, up to 7 mg in total, as well as a dose of Xanax and " Percocet; she is status post 1 L of fluids  -The patient is brought in for pain control for her sickle cell crisis    Past Medical History:   Diagnosis Date    Anticoagulant long-term use        No past surgical history on file.    Review of patient's allergies indicates:  No Known Allergies    No current facility-administered medications on file prior to encounter.     Current Outpatient Medications on File Prior to Encounter   Medication Sig    amitriptyline (ELAVIL) 50 MG tablet Take 1 tablet by mouth every evening.    folic acid (FOLVITE) 1 MG tablet Take 1 tablet (1 mg total) by mouth once daily.    glutamine, sickle cell, (ENDARI) 5 gram PwPk Take 15 g by mouth 3 (three) times daily with meals.    hydroxyurea (HYDREA) 500 mg Cap Take 1,000 mg by mouth once daily.    morphine (MS CONTIN) 30 MG 12 hr tablet Take 30 mg by mouth every 8 (eight) hours as needed for Pain.    multivitamin (ONE DAILY MULTIVITAMIN) per tablet Take 1 tablet by mouth once daily.    oxyCODONE (ROXICODONE) 15 MG Tab Take 15 mg by mouth every 4 (four) hours as needed for Pain.    ALPRAZolam (XANAX) 1 MG tablet Take 1 mg by mouth nightly as needed for Anxiety. (Patient not taking: Reported on 1/28/2025)    gabapentin (NEURONTIN) 100 MG capsule Take 100 mg by mouth 3 (three) times daily. (Patient not taking: Reported on 1/28/2025)     Family History    None       Tobacco Use    Smoking status: Never    Smokeless tobacco: Never   Substance and Sexual Activity    Alcohol use: Not Currently    Drug use: Not Currently          Review of Systems   Constitutional:  Negative for chills, fever and unexpected weight change.   HENT:  Negative for rhinorrhea and sore throat.    Respiratory:  Positive for cough and shortness of breath.    Cardiovascular:  Positive for chest pain.   Gastrointestinal:  Negative for blood in stool, constipation, diarrhea, nausea and vomiting.   Genitourinary:  Negative for dysuria.   Musculoskeletal:  Positive for myalgias.    Skin: Negative.    Neurological:  Negative for numbness.   Psychiatric/Behavioral:  Positive for dysphoric mood. The patient is nervous/anxious.      Objective:     Vital Signs (Most Recent):  Temp: 98.4 °F (36.9 °C) (01/28/25 1520)  Pulse: 80 (01/29/25 0435)  Resp: 19 (01/29/25 0734)  BP: (!) 116/57 (01/29/25 0435)  SpO2: 100 % (01/29/25 0435) Vital Signs (24h Range):  Temp:  [98.4 °F (36.9 °C)] 98.4 °F (36.9 °C)  Pulse:  [] 80  Resp:  [16-22] 19  SpO2:  [94 %-100 %] 100 %  BP: (116-141)/(57-90) 116/57     Weight: 81.6 kg (180 lb)  Body mass index is 32.92 kg/m².     Physical Exam  Constitutional:       General: She is not in acute distress.     Appearance: Normal appearance. She is not ill-appearing, toxic-appearing or diaphoretic.   HENT:      Head: Normocephalic and atraumatic.   Eyes:      General: No scleral icterus.  Neck:      Comments: No JVD/retractions  Cardiovascular:      Rate and Rhythm: Regular rhythm.      Heart sounds: Normal heart sounds. No murmur heard.     No friction rub. No gallop.   Pulmonary:      Effort: Pulmonary effort is normal. No respiratory distress.      Breath sounds: Normal breath sounds.   Abdominal:      General: Bowel sounds are normal. There is no distension.      Palpations: Abdomen is soft. There is no mass.      Tenderness: There is no abdominal tenderness.   Musculoskeletal:      Right lower leg: No edema.      Left lower leg: No edema.   Skin:     General: Skin is warm and dry.   Psychiatric:         Behavior: Behavior normal.     Significant studies: Reviewed.  Assessment/Plan:     Sickle cell crisis    -Patient with progressively worsening diffuse body pain over the last several weeks, which coincides with her lack of chronic pain medications  -Patient with high narcotic requirement in the ER; for now:  -Trend CBC  -IV fluids  -Patient with diffuse pain but given report of chest pain, will obtain troponin  -Folic acid resumed  -Will resume MS Contin at a lower  dose:  15 mg p.o. q.12 hours  -Oxycodone 5 mg every 4 hours as needed  -Dilaudid 1 mg IV every 3 hours as needed  -Did offer Hematology consult to which the patient declined, as she wants to see her new physician (who is outside of our system)    Hypertension    -Stable on no scheduled antihypertensives-> will monitor    On 01/29/2025, patient should be placed in hospital observation services under my care.       Suzie Mendez MD  Department of Hospital Medicine  [facility] - Emergency Dept

## 2025-01-29 NOTE — HPI
49-year-old  female with a history of sickle cell disease and avascular necrosis; she also endorses a history of anxiety/depression  Her hematologist recently relocated and she is in the process of obtaining a new 1 (sees that physician on Friday)  The patient has been out of her medications for about 4 weeks and during this time has had pain everywhere which has gotten progressively worse.  Ordinarily, she takes morphine 30 mg p.o. q.12 hours and OxyContin 15 mg (although she reports she takes 3 or 4 at a time)  As she has not had access to medications, she has been using ibuprofen over-the-counter  In the ER she was afebrile and hemodynamically stable initially tachycardic in the 110s (now 70s)  Pertinent workup shows unremarkable CMP and no evidence of a UTI  WBC was 15.55 with a hemoglobin of 8.3 (7.7 in October) and 10.3% reticulocyte count  Influenza/COVID testing were negative  Chest x-ray was negative except she did have osteonecrosis of the right humeral head  EKG showed, per my interpretation, sinus rhythm at 100 beats per minute with T-wave inversion in leads 2, 3, AVF, and V4 through V6 (similar to previous tracing)  The patient required multiple doses of Dilaudid, up to 7 mg in total, as well as a dose of Xanax and Percocet; she is status post 1 L of fluids  The patient is brought in for pain control to her sickle cell crisis

## 2025-01-29 NOTE — PLAN OF CARE
[facility]  Initial Discharge Assessment    Assessment completed at bedside with Pt, and all information on FaceSheet confirmed, including demographics, PCP, pharmacy and insurance. Pt has not addressed advance directives. She currently lives in [place] alone. Her PCP is Sebastián Chambers MD, and last appointment was two months ago. Her preferred pharmacy is [facility] on [address]. She denies any DME/HH/HD/Blood Thinners. Pt will transport herself back home at discharge. She denies any recent hospitalizations. Plan for discharge is to return home. Case Management to continue to follow for discharge planning needs.        Primary Care Provider: Sebastián Chambers MD    Admission Diagnosis: Sickle cell crisis [D57.00]  Sickle cell pain crisis [D57.00]    Admission Date: 1/28/2025  Expected Discharge Date:     Transition of Care Barriers: None    Payor: MEDICAID / Plan: [place] [employer] [place] / Product Type: Managed Medicaid /     Extended Emergency Contact Information  Primary Emergency Contact: Radha Cartyn  Mobile Phone: [phone]  Relation: Mother  Preferred language: English   needed? No    Discharge Plan A: Home  Discharge Plan B: Home with family      [id number] - [place] - [address] [facility] [address]  [address] [address]  Phone: [phone] Fax: [phone]      Initial Assessment (most recent)       Adult Discharge Assessment - 01/29/25 1105          Discharge Assessment    Assessment Type Discharge Planning Assessment     Confirmed/corrected address, phone number and insurance Yes     Confirmed Demographics Correct on Facesheet     Source of Information patient     When was your last doctors appointment? --   two months ago    Does patient/caregiver understand observation status Yes     Reason For Admission Sickle cell crisis     People in Home alone     Do you expect to return to your current living  situation? Yes     Do you have help at home or someone to help you manage your care at home? Yes     Who are your caregiver(s) and their phone number(s)? Alisa Carty (Mother)  [phone] (Mobile)     Prior to hospitilization cognitive status: Alert/Oriented     Current cognitive status: Alert/Oriented     Walking or Climbing Stairs Difficulty no     Dressing/Bathing Difficulty no     Home Accessibility wheelchair accessible     Home Layout Able to live on 1st floor     Equipment Currently Used at Home none     Readmission within 30 days? No     Patient currently being followed by outpatient case management? No     Do you currently have service(s) that help you manage your care at home? No     Do you take prescription medications? Yes     Do you have prescription coverage? Yes     Coverage Payor: MEDICAID - [facility] CONNECT -     Do you have any problems affording any of your prescribed medications? No     Is the patient taking medications as prescribed? yes     Who is going to help you get home at discharge? Alisa Carty (Mother)  [phone] (Mobile)     How do you get to doctors appointments? car, drives self     Are you on dialysis? No     Do you take coumadin? No     Discharge Plan A Home     Discharge Plan B Home with family     DME Needed Upon Discharge  none     Discharge Plan discussed with: Patient     Transition of Care Barriers None

## 2025-01-29 NOTE — ASSESSMENT & PLAN NOTE
Patient with progressively worsening diffuse body pain over the last several weeks, which coincides with her lack of chronic pain medications  Patient with high narcotic requirement in the ER; for now:  Trend CBC  Patient with diffuse pain but given report of chest pain, will obtain troponin  Folic acid resumed  We will resume MS Contin at a lower dose:  15 mg p.o. q.12 hours  Oxycodone 5 mg every 4 hours as needed  Dilaudid 1 mg IV every 3 hours as needed  Did offer Hematology consult to which the patient declined, as she wants to see her new physician (who is outside of our system)

## 2025-01-29 NOTE — SUBJECTIVE & OBJECTIVE
Past Medical History:   Diagnosis Date    Anticoagulant long-term use        No past surgical history on file.    Review of patient's allergies indicates:  No Known Allergies    No current facility-administered medications on file prior to encounter.     Current Outpatient Medications on File Prior to Encounter   Medication Sig    amitriptyline (ELAVIL) 50 MG tablet Take 1 tablet by mouth every evening.    folic acid (FOLVITE) 1 MG tablet Take 1 tablet (1 mg total) by mouth once daily.    glutamine, sickle cell, (ENDARI) 5 gram PwPk Take 15 g by mouth 3 (three) times daily with meals.    hydroxyurea (HYDREA) 500 mg Cap Take 1,000 mg by mouth once daily.    morphine (MS CONTIN) 30 MG 12 hr tablet Take 30 mg by mouth every 8 (eight) hours as needed for Pain.    multivitamin (ONE DAILY MULTIVITAMIN) per tablet Take 1 tablet by mouth once daily.    oxyCODONE (ROXICODONE) 15 MG Tab Take 15 mg by mouth every 4 (four) hours as needed for Pain.    ALPRAZolam (XANAX) 1 MG tablet Take 1 mg by mouth nightly as needed for Anxiety. (Patient not taking: Reported on 1/28/2025)    gabapentin (NEURONTIN) 100 MG capsule Take 100 mg by mouth 3 (three) times daily. (Patient not taking: Reported on 1/28/2025)     Family History    None       Tobacco Use    Smoking status: Never    Smokeless tobacco: Never   Substance and Sexual Activity    Alcohol use: Not Currently    Drug use: Not Currently    Sexual activity: Not Currently     Review of Systems   Constitutional:  Negative for chills, fever and unexpected weight change.   HENT:  Negative for rhinorrhea and sore throat.    Respiratory:  Positive for cough and shortness of breath.    Cardiovascular:  Positive for chest pain.   Gastrointestinal:  Negative for blood in stool, constipation, diarrhea, nausea and vomiting.   Genitourinary:  Negative for dysuria.   Musculoskeletal:  Positive for myalgias.   Skin: Negative.    Neurological:  Negative for numbness.   Psychiatric/Behavioral:   Positive for dysphoric mood. The patient is nervous/anxious.      Objective:     Vital Signs (Most Recent):  Temp: 98.4 °F (36.9 °C) (01/28/25 1520)  Pulse: 80 (01/29/25 0435)  Resp: 19 (01/29/25 0734)  BP: (!) 116/57 (01/29/25 0435)  SpO2: 100 % (01/29/25 0435) Vital Signs (24h Range):  Temp:  [98.4 °F (36.9 °C)] 98.4 °F (36.9 °C)  Pulse:  [] 80  Resp:  [16-22] 19  SpO2:  [94 %-100 %] 100 %  BP: (116-141)/(57-90) 116/57     Weight: 81.6 kg (180 lb)  Body mass index is 32.92 kg/m².     Physical Exam  Constitutional:       General: She is not in acute distress.     Appearance: Normal appearance. She is not ill-appearing, toxic-appearing or diaphoretic.   HENT:      Head: Normocephalic and atraumatic.   Eyes:      General: No scleral icterus.  Neck:      Comments: No JVD/retractions  Cardiovascular:      Rate and Rhythm: Regular rhythm.      Heart sounds: Normal heart sounds. No murmur heard.     No friction rub. No gallop.   Pulmonary:      Effort: Pulmonary effort is normal. No respiratory distress.      Breath sounds: Normal breath sounds.   Abdominal:      General: Bowel sounds are normal. There is no distension.      Palpations: Abdomen is soft. There is no mass.      Tenderness: There is no abdominal tenderness.   Musculoskeletal:      Right lower leg: No edema.      Left lower leg: No edema.   Skin:     General: Skin is warm and dry.   Psychiatric:         Behavior: Behavior normal.                Significant studies: Reviewed.

## 2025-01-29 NOTE — ED PROVIDER NOTES
"Encounter Date: 1/28/2025       History     Chief Complaint   Patient presents with    Sickle Cell Pain Crisis     Has been out of medications for 2 wks.  Unable to get regular medications "Can't take the pain anymore"     IOANA Mendez is a 49 y.o. female with a past medical history sickle cell disease that presented for sickle cell pain crisis.  Patient does complain of some upper respiratory symptoms.  She typically takes MS Contin 30 mg every 8 hours and oxycodone 15 mg every 4 hours for her pain.  She has been unable to obtain these medications for the past month as she missed her outpatient appointment.  She states that she missed her appointment due to a migraine.  She is complaining of bilateral leg, back, chest wall, and arm pain.  This has similar to her previous sickle cell pain crises.  Came to emergency department further evaluation.  Denies shortness of breath, fever, leg swelling, numbness, weakness, vomiting, and cough.    Review of patient's allergies indicates:  No Known Allergies  Past Medical History:   Diagnosis Date    Anticoagulant long-term use      No past surgical history on file.  No family history on file.  Social History     Tobacco Use    Smoking status: Never    Smokeless tobacco: Never   Substance Use Topics    Alcohol use: Not Currently    Drug use: Not Currently     Review of Systems   Constitutional:  Negative for fever.   HENT:  Positive for congestion. Negative for sore throat.    Eyes:  Negative for visual disturbance.   Respiratory:  Negative for cough and shortness of breath.    Cardiovascular:  Negative for chest pain.   Gastrointestinal:  Negative for abdominal pain, diarrhea, nausea and vomiting.   Genitourinary:  Negative for dysuria, frequency, hematuria and urgency.   Musculoskeletal:  Negative for back pain and neck pain.        Diffuse pain.   Skin:  Negative for rash.   Neurological:  Negative for dizziness, weakness, numbness and headaches.   Hematological:  " Does not bruise/bleed easily.       Physical Exam     Initial Vitals [01/28/25 1520]   BP Pulse Resp Temp SpO2   (!) 141/76 (!) 112 (!) 22 98.4 °F (36.9 °C) 97 %      MAP       --         Physical Exam    Nursing note and vitals reviewed.  Constitutional: She appears well-developed and well-nourished.   Uncomfortable appearing.   HENT:   Head: Normocephalic and atraumatic.   Eyes: EOM are normal. Pupils are equal, round, and reactive to light.   Neck:   Normal range of motion.  Cardiovascular:  Normal rate, regular rhythm and normal heart sounds.           Pulmonary/Chest: Breath sounds normal. No respiratory distress. She has no wheezes. She has no rales.   Abdominal: Abdomen is soft. She exhibits no distension. There is no abdominal tenderness. There is no rebound.   Musculoskeletal:         General: Normal range of motion.      Cervical back: Normal range of motion.     Neurological: She is alert and oriented to person, place, and time. She has normal strength. No cranial nerve deficit or sensory deficit. GCS score is 15. GCS eye subscore is 4. GCS verbal subscore is 5. GCS motor subscore is 6.   Skin: Capillary refill takes less than 2 seconds. No rash noted.   Psychiatric: She has a normal mood and affect. Thought content normal.         ED Course   Procedures  Labs Reviewed   CBC W/ AUTO DIFFERENTIAL - Abnormal       Result Value    WBC 15.55 (*)     RBC 2.74 (*)     Hemoglobin 8.3 (*)     Hematocrit 24.3 (*)     MCV 89      MCH 30.3      MCHC 34.2      RDW 20.2 (*)     Platelets 507 (*)     MPV 9.9      Immature Granulocytes 0.6 (*)     Gran # (ANC) 9.0 (*)     Immature Grans (Abs) 0.10 (*)     Lymph # 4.6      Mono # 1.6 (*)     Eos # 0.2      Baso # 0.07      nRBC 1 (*)     Gran % 58.0      Lymph % 29.6      Mono % 10.1      Eosinophil % 1.2      Basophil % 0.5      Platelet Estimate Increased (*)     Aniso Moderate      Poly Occasional      Ovalocytes Occasional      Sickle Cells Occasional (*)      Differential Method Automated     COMPREHENSIVE METABOLIC PANEL - Abnormal    Sodium 138      Potassium 3.5      Chloride 105      CO2 28      Glucose 164 (*)     BUN 5 (*)     Creatinine 0.5      Calcium 9.2      Total Protein 8.1      Albumin 4.0      Total Bilirubin 1.7 (*)     Alkaline Phosphatase 98      AST 22      ALT 8 (*)     eGFR >60.0      Anion Gap 5 (*)    URINALYSIS, REFLEX TO URINE CULTURE - Abnormal    Specimen UA Urine, Clean Catch      Color, UA Yellow      Appearance, UA Clear      pH, UA 6.0      Specific Gravity, UA 1.010      Protein, UA 1+ (*)     Glucose, UA Negative      Ketones, UA Negative      Bilirubin (UA) Negative      Occult Blood UA TRACE      Nitrite, UA Negative      Urobilinogen, UA 4.0-6.0 (*)     Leukocytes, UA Negative      Narrative:     Specimen Source->Urine   RETICULOCYTES - Abnormal    Retic 10.3 (*)    URINALYSIS MICROSCOPIC - Abnormal    RBC, UA 1      WBC, UA 1      Bacteria Rare      Squam Epithel, UA 3      Hyaline Casts, UA 3 (*)     Microscopic Comment SEE COMMENT      Narrative:     Specimen Source->Urine   SARS-COV-2 RNA AMPLIFICATION, QUAL    SARS-CoV-2 RNA, Amplification, Qual Negative     INFLUENZA A AND B ANTIGEN    Influenza A, Molecular Negative      Influenza B, Molecular Negative      Flu A & B Source Nasal swab      Narrative:     Specimen Source->Nasopharyngeal Swab   PREGNANCY TEST, URINE RAPID   PREGNANCY TEST, URINE RAPID    Preg Test, Ur Negative          ECG Results              EKG 12-lead (In process)        Collection Time Result Time QRS Duration OHS QTC Calculation    01/28/25 15:43:59 01/28/25 15:45:30 70 417                     In process by Interface, Lab In [facility] (01/28/25 15:45:38)                   Narrative:    Test Reason : R00.0,    Vent. Rate : 100 BPM     Atrial Rate : 100 BPM     P-R Int : 138 ms          QRS Dur :  70 ms      QT Int : 324 ms       P-R-T Axes :  55  23 209 degrees    QTcB Int : 417 ms    Normal sinus  rhythm  Minimal voltage criteria for LVH, may be normal variant ( R in aVL )  Cannot rule out Anterior infarct ,age undetermined  T wave abnormality, consider inferior ischemia  Abnormal ECG  No previous ECGs available    Referred By:            Confirmed By:                                   Imaging Results              X-Ray Chest PA And Lateral (Final result)  Result time 01/28/25 18:21:25      Final result by Kei Zamora MD (01/28/25 18:21:25)                   Impression:      As above      Electronically signed by: Kei Zamora  Date:    01/28/2025  Time:    18:21               Narrative:    EXAMINATION:  XR CHEST PA AND LATERAL    CLINICAL HISTORY:  Hb-SS disease with crisis, unspecified    TECHNIQUE:  PA and lateral views of the chest were performed.    COMPARISON:  10/11/2024    FINDINGS:  Normal heart size.  Bilateral multifocal scarring or atelectasis.  No focal consolidation.  Osteonecrosis of the right humeral head                                       Medications   sodium chloride 0.9% bolus 1,000 mL 1,000 mL (0 mLs Intravenous Stopped 1/28/25 1845)   ketorolac injection 15 mg (15 mg Intravenous Given 1/28/25 1748)   oxyCODONE-acetaminophen  mg per tablet 1 tablet (1 tablet Oral Given 1/28/25 1745)   HYDROmorphone injection 1 mg (1 mg Intravenous Given 1/28/25 2039)   HYDROmorphone injection 2 mg (2 mg Intravenous Given 1/28/25 2106)   HYDROmorphone injection 2 mg (2 mg Intravenous Given 1/28/25 2256)   ibuprofen tablet 800 mg (800 mg Oral Given 1/29/25 0137)   ALPRAZolam tablet 1 mg (1 mg Oral Given 1/29/25 0137)   HYDROmorphone injection 2 mg (2 mg Intravenous Given 1/29/25 0158)     Medical Decision Making  Sunita Mendez is a 49 y.o. female with a past medical history of sickle cell anemia that presented to the emergency department for sickle cell crisis.  Vitals were stable.  Nontoxic female.  No respiratory distress.  Well-perfused.  Full range of motion of all extremities.   Lungs are clear.  Presentation concerning for sickle cell pain crisis.  Chest x-ray is clear.  Afebrile.  No evidence of acute chest.  CBC with white count of 15.5 but hemoglobin is 8.3.  Reticulocyte count is 10.3.  Viral swabs were negative.  Given fluids, Toradol, Percocet, and 3 mg of Dilaudid so far.  Continuing to complain of pain.  We will give an additional 2 g of Dilaudid.  Care handed off to nighttime physician    Amount and/or Complexity of Data Reviewed  Radiology: ordered.    Risk  OTC drugs.  Prescription drug management.               ED Course as of 01/29/25 0230 Wed Jan 29, 2025   0204 Pt not feeling well for d/c on reexam.  She c/o HA and feeling anxious.  Requesting home dose Xanax and Ibuprofen 800mg as well as next dose Dilaudid when due.  Will admit to hospitalist for obs. [JA]      ED Course User Index  [JA] Ladi Martinez MD                           Clinical Impression:  Final diagnoses:  [R00.0] Tachycardia  [D57.00] Sickle cell pain crisis          ED Disposition Condition    Observation Stable                Ladi Martinez MD  01/29/25 0230

## 2025-01-30 VITALS
DIASTOLIC BLOOD PRESSURE: 54 MMHG | BODY MASS INDEX: 35.75 KG/M2 | TEMPERATURE: 98 F | SYSTOLIC BLOOD PRESSURE: 105 MMHG | WEIGHT: 194.25 LBS | HEART RATE: 102 BPM | HEIGHT: 62 IN | RESPIRATION RATE: 17 BRPM | OXYGEN SATURATION: 87 %

## 2025-01-30 LAB
ALBUMIN SERPL BCP-MCNC: 3.7 G/DL (ref 3.5–5.2)
ALP SERPL-CCNC: 112 U/L (ref 55–135)
ALT SERPL W/O P-5'-P-CCNC: 7 U/L (ref 10–44)
ANION GAP SERPL CALC-SCNC: 0 MMOL/L (ref 8–16)
AST SERPL-CCNC: 23 U/L (ref 10–40)
BASOPHILS # BLD AUTO: 0.05 K/UL (ref 0–0.2)
BASOPHILS NFR BLD: 0.4 % (ref 0–1.9)
BILIRUB SERPL-MCNC: 1.2 MG/DL (ref 0.1–1)
BUN SERPL-MCNC: 6 MG/DL (ref 6–20)
CALCIUM SERPL-MCNC: 8.6 MG/DL (ref 8.7–10.5)
CHLORIDE SERPL-SCNC: 108 MMOL/L (ref 95–110)
CO2 SERPL-SCNC: 31 MMOL/L (ref 23–29)
CREAT SERPL-MCNC: 0.5 MG/DL (ref 0.5–1.4)
DIFFERENTIAL METHOD BLD: ABNORMAL
EOSINOPHIL # BLD AUTO: 1 K/UL (ref 0–0.5)
EOSINOPHIL NFR BLD: 7.3 % (ref 0–8)
ERYTHROCYTE [DISTWIDTH] IN BLOOD BY AUTOMATED COUNT: 20.5 % (ref 11.5–14.5)
EST. GFR  (NO RACE VARIABLE): >60 ML/MIN/1.73 M^2
ESTIMATED AVG GLUCOSE: 82 MG/DL (ref 68–131)
GLUCOSE SERPL-MCNC: 197 MG/DL (ref 70–110)
HBA1C MFR BLD: 4.5 % (ref 4.5–6.2)
HCT VFR BLD AUTO: 22.5 % (ref 37–48.5)
HGB BLD-MCNC: 7.2 G/DL (ref 12–16)
IMM GRANULOCYTES # BLD AUTO: 0.1 K/UL (ref 0–0.04)
IMM GRANULOCYTES NFR BLD AUTO: 0.8 % (ref 0–0.5)
LYMPHOCYTES # BLD AUTO: 5.4 K/UL (ref 1–4.8)
LYMPHOCYTES NFR BLD: 41.3 % (ref 18–48)
MAGNESIUM SERPL-MCNC: 1.8 MG/DL (ref 1.6–2.6)
MCH RBC QN AUTO: 29.3 PG (ref 27–31)
MCHC RBC AUTO-ENTMCNC: 32 G/DL (ref 32–36)
MCV RBC AUTO: 92 FL (ref 82–98)
MONOCYTES # BLD AUTO: 1.7 K/UL (ref 0.3–1)
MONOCYTES NFR BLD: 12.9 % (ref 4–15)
NEUTROPHILS # BLD AUTO: 4.9 K/UL (ref 1.8–7.7)
NEUTROPHILS NFR BLD: 37.3 % (ref 38–73)
NRBC BLD-RTO: 1 /100 WBC
PLATELET # BLD AUTO: 436 K/UL (ref 150–450)
PMV BLD AUTO: 10.2 FL (ref 9.2–12.9)
POTASSIUM SERPL-SCNC: 4.6 MMOL/L (ref 3.5–5.1)
PROT SERPL-MCNC: 6.9 G/DL (ref 6–8.4)
RBC # BLD AUTO: 2.46 M/UL (ref 4–5.4)
SODIUM SERPL-SCNC: 139 MMOL/L (ref 136–145)
TROPONIN I SERPL HS-MCNC: 12 PG/ML (ref 0–14.9)
TSH SERPL DL<=0.005 MIU/L-ACNC: 1.78 UIU/ML (ref 0.34–5.6)
WBC # BLD AUTO: 13.14 K/UL (ref 3.9–12.7)

## 2025-01-30 PROCEDURE — 63600175 PHARM REV CODE 636 W HCPCS: Mod: JZ | Performed by: NURSE PRACTITIONER

## 2025-01-30 PROCEDURE — 25000003 PHARM REV CODE 250: Performed by: INTERNAL MEDICINE

## 2025-01-30 PROCEDURE — 63600175 PHARM REV CODE 636 W HCPCS: Performed by: FAMILY MEDICINE

## 2025-01-30 PROCEDURE — 84484 ASSAY OF TROPONIN QUANT: CPT | Performed by: INTERNAL MEDICINE

## 2025-01-30 PROCEDURE — 80053 COMPREHEN METABOLIC PANEL: CPT | Performed by: INTERNAL MEDICINE

## 2025-01-30 PROCEDURE — 36415 COLL VENOUS BLD VENIPUNCTURE: CPT | Performed by: INTERNAL MEDICINE

## 2025-01-30 PROCEDURE — 84443 ASSAY THYROID STIM HORMONE: CPT | Performed by: INTERNAL MEDICINE

## 2025-01-30 PROCEDURE — 25000003 PHARM REV CODE 250: Performed by: NURSE PRACTITIONER

## 2025-01-30 PROCEDURE — 83735 ASSAY OF MAGNESIUM: CPT | Performed by: INTERNAL MEDICINE

## 2025-01-30 PROCEDURE — 85025 COMPLETE CBC W/AUTO DIFF WBC: CPT | Performed by: INTERNAL MEDICINE

## 2025-01-30 PROCEDURE — 83036 HEMOGLOBIN GLYCOSYLATED A1C: CPT | Performed by: INTERNAL MEDICINE

## 2025-01-30 RX ORDER — DIPHENHYDRAMINE HYDROCHLORIDE 50 MG/ML
25 INJECTION INTRAMUSCULAR; INTRAVENOUS ONCE
Status: COMPLETED | OUTPATIENT
Start: 2025-01-30 | End: 2025-01-30

## 2025-01-30 RX ORDER — KETOROLAC TROMETHAMINE 30 MG/ML
30 INJECTION, SOLUTION INTRAMUSCULAR; INTRAVENOUS ONCE
Status: COMPLETED | OUTPATIENT
Start: 2025-01-30 | End: 2025-01-30

## 2025-01-30 RX ORDER — MORPHINE SULFATE 30 MG/1
30 TABLET, FILM COATED, EXTENDED RELEASE ORAL EVERY 8 HOURS PRN
Qty: 3 TABLET | Refills: 0 | Status: SHIPPED | OUTPATIENT
Start: 2025-01-30 | End: 2025-01-31

## 2025-01-30 RX ORDER — OXYCODONE HYDROCHLORIDE 15 MG/1
15 TABLET ORAL EVERY 4 HOURS PRN
Qty: 6 TABLET | Refills: 0 | Status: SHIPPED | OUTPATIENT
Start: 2025-01-30

## 2025-01-30 RX ORDER — PROCHLORPERAZINE EDISYLATE 5 MG/ML
10 INJECTION INTRAMUSCULAR; INTRAVENOUS ONCE
Status: COMPLETED | OUTPATIENT
Start: 2025-01-30 | End: 2025-01-30

## 2025-01-30 RX ADMIN — GABAPENTIN 100 MG: 100 CAPSULE ORAL at 08:01

## 2025-01-30 RX ADMIN — SODIUM CHLORIDE: 9 INJECTION, SOLUTION INTRAVENOUS at 02:01

## 2025-01-30 RX ADMIN — DIPHENHYDRAMINE HYDROCHLORIDE 25 MG: 50 INJECTION INTRAMUSCULAR; INTRAVENOUS at 01:01

## 2025-01-30 RX ADMIN — OXYCODONE HYDROCHLORIDE 15 MG: 10 TABLET ORAL at 09:01

## 2025-01-30 RX ADMIN — HYDROMORPHONE HYDROCHLORIDE 2 MG: 1 INJECTION, SOLUTION INTRAMUSCULAR; INTRAVENOUS; SUBCUTANEOUS at 06:01

## 2025-01-30 RX ADMIN — HYDROMORPHONE HYDROCHLORIDE 2 MG: 1 INJECTION, SOLUTION INTRAMUSCULAR; INTRAVENOUS; SUBCUTANEOUS at 01:01

## 2025-01-30 RX ADMIN — MORPHINE SULFATE 30 MG: 30 TABLET, FILM COATED, EXTENDED RELEASE ORAL at 08:01

## 2025-01-30 RX ADMIN — GABAPENTIN 100 MG: 100 CAPSULE ORAL at 01:01

## 2025-01-30 RX ADMIN — KETOROLAC TROMETHAMINE 30 MG: 30 INJECTION, SOLUTION INTRAMUSCULAR; INTRAVENOUS at 01:01

## 2025-01-30 RX ADMIN — FOLIC ACID 1 MG: 1 TABLET ORAL at 08:01

## 2025-01-30 RX ADMIN — PROCHLORPERAZINE EDISYLATE 10 MG: 5 INJECTION INTRAMUSCULAR; INTRAVENOUS at 01:01

## 2025-01-30 RX ADMIN — HYDROMORPHONE HYDROCHLORIDE 2 MG: 1 INJECTION, SOLUTION INTRAMUSCULAR; INTRAVENOUS; SUBCUTANEOUS at 10:01

## 2025-01-30 NOTE — PLAN OF CARE
Problem: Adult Inpatient Plan of Care  Goal: Plan of Care Review  1/30/2025 1345 by Sendy Anna RN  Outcome: Progressing  1/30/2025 1343 by Sendy Anna RN  Outcome: Progressing  Goal: Patient-Specific Goal (Individualized)  1/30/2025 1345 by Sendy Anna RN  Outcome: Progressing  1/30/2025 1343 by Sendy Anna RN  Outcome: Progressing  Goal: Absence of Hospital-Acquired Illness or Injury  1/30/2025 1345 by Sendy Anna RN  Outcome: Progressing  1/30/2025 1343 by Sendy Anna RN  Outcome: Progressing  Goal: Optimal Comfort and Wellbeing  1/30/2025 1345 by Sendy Anna RN  Outcome: Progressing  1/30/2025 1343 by Sendy Anna RN  Outcome: Progressing  Goal: Readiness for Transition of Care  1/30/2025 1345 by Sendy Anna RN  Outcome: Progressing  1/30/2025 1343 by Sendy Anna RN  Outcome: Progressing

## 2025-01-30 NOTE — DISCHARGE SUMMARY
[facility] Medicine  Discharge Summary      Patient Name: Sunita Mendez  MRN: [id number]  AMIRA: [id number]  Patient Class: IP- Inpatient  Admission Date: 1/28/2025  Hospital Length of Stay: 1 days  Discharge Date and Time:  01/30/2025 1:44 PM  Attending Physician: Lavell Coburn DO   Discharging Provider: Lavell Coburn DO  Primary Care Provider: Sebastián Chambers MD    Primary Care Team: Networked reference to record PCT     HPI:   49-year-old  female with a history of sickle cell disease and avascular necrosis; she also endorses a history of anxiety/depression  Her hematologist recently relocated and she is in the process of obtaining a new 1 (sees that physician on Friday)  The patient has been out of her medications for about 4 weeks and during this time has had pain everywhere which has gotten progressively worse.  Ordinarily, she takes morphine 30 mg p.o. q.12 hours and OxyContin 15 mg (although she reports she takes 3 or 4 at a time)  As she has not had access to medications, she has been using ibuprofen over-the-counter  In the ER she was afebrile and hemodynamically stable initially tachycardic in the 110s (now 70s)  Pertinent workup shows unremarkable CMP and no evidence of a UTI  WBC was 15.55 with a hemoglobin of 8.3 (7.7 in October) and 10.3% reticulocyte count  Influenza/COVID testing were negative  Chest x-ray was negative except she did have osteonecrosis of the right humeral head  EKG showed, per my interpretation, sinus rhythm at 100 beats per minute with T-wave inversion in leads 2, 3, AVF, and V4 through V6 (similar to previous tracing)  The patient required multiple doses of Dilaudid, up to 7 mg in total, as well as a dose of Xanax and Percocet; she is status post 1 L of fluids  The patient is brought in for pain control to her sickle cell crisis    * No surgery found *      Hospital Course:   Ms. Mendez has been monitored closely during her hospitalization. She  was admitted on 1/29/25 for sickle cell pain crisis. She ran out of her home pain medications 3 weeks ago and has been taking ibuprofen for pain as she missed her last appt d/t migraine and then the snow. She has an appt with a new provider on Friday 1/31 and is eager to make this appt. Pain is mostly in her joints. CXR with no acute consolidation. EKG with no acute changes. She was initially resumed on a lower dose of her home pain medication as she has been off of it for some time, but her pain was poorly controlled and pain medication has been adjusted per her home regimen.     Patient said she has appointment with her Hematology Oncology to receive her pain medication outpatient tomorrow.  Prescribed 1 day of her home pain medication so that patient can make it to appointment tomorrow.  Patient's vitals and labs were reviewed and patient was discharged once medically stable.  Physical exam performed before discharge.     Goals of Care Treatment Preferences:  Code Status: Full Code      SDOH Screening:  The patient was screened for food insecurity, housing instability, transportation needs, utility difficulties, and interpersonal safety. The social determinant(s) of health identified as a concern this admission are:  Food insecurity        Social Drivers of Health with Concerns     Food Insecurity: Food Insecurity Present (1/29/2025)        Consults:     * Sickle cell crisis  Patient with progressively worsening diffuse body pain over the last several weeks, which coincides with her lack of chronic pain medications  Patient with high narcotic requirement in the ER; for now:  Trend CBC  Patient with diffuse pain but given report of chest pain, will obtain troponin  Folic acid resumed  We will resume MS Contin at a lower dose:  15 mg p.o. q.12 hours  Oxycodone 5 mg every 4 hours as needed  Dilaudid 1 mg IV every 3 hours as needed  Did offer Hematology consult to which the patient declined, as she wants to see her  new physician (who is outside of our system)    Hypertension  Stable on no scheduled antihypertensives-> will monitor      Final Active Diagnoses:    Diagnosis Date Noted POA    PRINCIPAL PROBLEM:  Sickle cell crisis [D57.00] 08/18/2020 Yes    Hypertension [I10] 10/19/2024 Yes      Problems Resolved During this Admission:       Discharged Condition: fair    Disposition: Home or Self Care    Follow Up:   Follow-up Information       Sebastián Chambers MD Follow up in 1 week(s).    Specialty: Internal Medicine  Contact information:  [address] [place] [address] [address]  [facility] [address] [phone]                           Patient Instructions:   No discharge procedures on file.    Significant Diagnostic Studies: Labs: BMP:   Recent Labs   Lab 01/28/25  1640 01/29/25 0445 01/30/25  0434   * 130* 197*    139 139   K 3.5 3.6 4.6    106 108   CO2 28 28 31*   BUN 5* 4* 6   CREATININE 0.5 0.5 0.5   CALCIUM 9.2 8.2* 8.6*   MG  --  1.7 1.8   , CMP   Recent Labs   Lab 01/28/25 1640 01/29/25 0445 01/30/25  0434    139 139   K 3.5 3.6 4.6    106 108   CO2 28 28 31*   * 130* 197*   BUN 5* 4* 6   CREATININE 0.5 0.5 0.5   CALCIUM 9.2 8.2* 8.6*   PROT 8.1 6.9 6.9   ALBUMIN 4.0 3.5 3.7   BILITOT 1.7* 1.3* 1.2*   ALKPHOS 98 84 112   AST 22 19 23   ALT 8* 6* 7*   ANIONGAP 5* 5* 0*   , and CBC   Recent Labs   Lab 01/28/25  1640 01/29/25 0445 01/30/25  0434   WBC 15.55* 12.46 13.14*   HGB 8.3* 7.2* 7.2*   HCT 24.3* 21.5* 22.5*   * 450 436       Pending Diagnostic Studies:       None           Medications:  Reconciled Home Medications:      Medication List        CONTINUE taking these medications      amitriptyline 50 MG tablet  Commonly known as: ELAVIL  Take 1 tablet by mouth every evening.     ENDARI 5 gram Pwpk  Generic drug: glutamine (sickle cell)  Take 15 g by mouth 3 (three) times daily with meals.     folic acid 1 MG tablet  Commonly known as: FOLVITE  Take 1 tablet (1 mg total) by  mouth once daily.     hydroxyurea 500 mg Cap  Commonly known as: HYDREA  Take 1,000 mg by mouth once daily.     morphine 30 MG 12 hr tablet  Commonly known as: MS CONTIN  Take 1 tablet (30 mg total) by mouth every 8 (eight) hours as needed for Pain.     ONE DAILY MULTIVITAMIN per tablet  Generic drug: multivitamin  Take 1 tablet by mouth once daily.     oxyCODONE 15 MG Tab  Commonly known as: ROXICODONE  Take 1 tablet (15 mg total) by mouth every 4 (four) hours as needed for Pain.            STOP taking these medications      ALPRAZolam 1 MG tablet  Commonly known as: XANAX     gabapentin 100 MG capsule  Commonly known as: NEURONTIN              Indwelling Lines/Drains at time of discharge:   Lines/Drains/Airways       None                   Time spent on the discharge of patient: 32 minutes         Lavell Coburn DO  Department of Hospital Medicine  [facility]

## 2025-01-30 NOTE — PLAN OF CARE
reviewed the chart, and spoke with the patient and the Attending MD at the bedside. The patient is now cleared to discharge home per MD Coburn. The patient has a follow up apt tomorrow with her Heme Onc MD (Dr. Coburn). The patient's son will transport her home. There are no further CM needs.      The patient is now cleared from a case management standpoint to DC home.       01/30/25 1345   Final Note   Assessment Type Final Discharge Note   Anticipated Discharge Disposition Home   What phone number can be called within the next 1-3 days to see how you are doing after discharge? [phone]

## 2025-01-30 NOTE — PROGRESS NOTES
Report called to Thania RN. Will transfer to room 3102   01/29/25 2037   Pain/Comfort/Sleep   Preferred Pain Scale number (Numeric Rating Pain Scale)   Comfort/Acceptable Pain Level 0   Pain Rating (0-10): Rest 10   POSS ([place] Opioid-Induced Sed Scale) 1 - Awake and alert   Pain Reassessment   Pain Rating Prior to Med Admin 8   Pain Rating Post Med Admin 6   PRN Med Reassessment   PRN Med Reassessment (Excludes Pain Medications) Moderate relief obtained   Cognitive   Cognitive/Neuro/Behavioral WDL WDL   Level of Consciousness (AVPU) alert   Pupils   Pupil PERRLA yes   HEENT   HEENT WDL WDL   Respiratory   Respiratory WDL WDL   Rhythm/Pattern, Respiratory unlabored;pattern regular   Expansion/Accessory Muscles/Retractions no use of accessory muscles   Oxygen Therapy   Device (Oxygen Therapy) nasal cannula   Flow (L/min) (Oxygen Therapy) 1.5   Cardiac   Cardiac WDL WDL   Peripheral Neurovascular   Peripheral Neurovascular WDL WDL   VTE Prevention/Management ROM (active) performed        Peripheral IV - Single Lumen 01/28/25 1643 20 G Left Antecubital   Placement Date/Time: 01/28/25 1643   Size (G): 20 G  Orientation: Left  Location: Antecubital  Placement directed by: Anatomic Landmarks  Site Prep: Chlorhexidine   Insertion attempts (enter comment if more than 2 attempts): 1   Site Assessment Clean;Dry;Intact   Line Status Infusing   Dressing Status Clean;Dry;Intact   Dressing Intervention Integrity maintained   Skin   Skin WDL WDL   4EYES: 2 Clinical Staff Inspection of bony prominences No new pressure injury noted   4EYES: 2nd Clinical Staff Member (Type Name) denise Botello Risk Assessment   Sensory Perception 4-->no impairment   Moisture 4-->rarely moist   Activity 4-->walks frequently   Mobility 4-->no limitation   Nutrition 4-->excellent   Friction and Shear 3-->no apparent problem   Ayan Score 23   Musculoskeletal   Musculoskeletal WDL WDL   Gastrointestinal   GI WDL WDL   Last Bowel Movement 01/27/25    Genitourinary   Genitourinary [facility]   Coping/Psychosocial   Observed Emotional State accepting   Verbalized Emotional State acceptance   Plan of Care Reviewed With patient   Safety   Safety [facility]   Enhanced Safety Measures bed alarm refused   Fall Risk Assessment (every shift)   History Of Fall (W/I 3 Mos) 0-->No   Polypharmacy 3-->Yes   Central Nervous System/Psychotropic Medication 0-->No   Cardiovascular Medication 0-->No   Age Greater Than 65 Years 0-->No   Altered Elimination 0-->No   Cognitive Deficit 0-->No   Sensory Deficit 0-->No   Dizziness/Vertigo 0-->No   Depression 0-->No   Mobility Deficit/Weakness 0-->No   Male 0-->No   Fall Risk Score 3   ABC Risk for Fall with Injury Assessment   A= Age: Is the patient greater than or equal to 85 years old or frail due to clinical condition? No   B=Bones: Does the patient have osteoporosis, previous fracture, prolonged steroid use, or metastatic bone cancer? No   C=Coagulation Disorders: Does the patient have a bleeding disorder, either through anticoagulants or underlying clinical condition? No   S=recent Surgery: Is the patient post-op surgicalwith a recent lower limb amputation or recent major abdominal or thoracic surgery? No   Safety Management   Safety Promotion/Fall Prevention assistive device/personal item within reach   Medication Review/Management medications reviewed   Patient Rounds bed in low position   Daily Care   Activity Management Ambulated in hogan - L4   Activity Assistance Provided independent   Symptoms Noted During/After Activity none   Mobility   GEMS ([place] Early Mobility Scale) Level 4-Independent activities   Positioning   Body Position position changed independently   Head of Bed (HOB) Positioning HOB elevated   Positioning/Transfer Devices pillows;in use   RN Clinical Review   I have evaluated the data collected on this patient and nursing care provided. Done

## 2025-01-31 LAB
OHS QRS DURATION: 70 MS
OHS QTC CALCULATION: 417 MS